# Patient Record
Sex: FEMALE | Race: WHITE | NOT HISPANIC OR LATINO | Employment: FULL TIME | URBAN - METROPOLITAN AREA
[De-identification: names, ages, dates, MRNs, and addresses within clinical notes are randomized per-mention and may not be internally consistent; named-entity substitution may affect disease eponyms.]

---

## 2018-04-11 ENCOUNTER — TRANSFERRED RECORDS (OUTPATIENT)
Dept: HEALTH INFORMATION MANAGEMENT | Facility: CLINIC | Age: 33
End: 2018-04-11

## 2018-10-02 ENCOUNTER — TRANSFERRED RECORDS (OUTPATIENT)
Dept: HEALTH INFORMATION MANAGEMENT | Facility: CLINIC | Age: 33
End: 2018-10-02

## 2018-10-02 LAB
HPV ABSTRACT: NORMAL
PAP-ABSTRACT: NORMAL

## 2019-05-30 ENCOUNTER — TRANSFERRED RECORDS (OUTPATIENT)
Dept: HEALTH INFORMATION MANAGEMENT | Facility: CLINIC | Age: 34
End: 2019-05-30

## 2021-06-21 ENCOUNTER — OFFICE VISIT (OUTPATIENT)
Dept: FAMILY MEDICINE | Facility: CLINIC | Age: 36
End: 2021-06-21
Payer: COMMERCIAL

## 2021-06-21 VITALS
DIASTOLIC BLOOD PRESSURE: 85 MMHG | BODY MASS INDEX: 40.29 KG/M2 | HEIGHT: 69 IN | WEIGHT: 272 LBS | SYSTOLIC BLOOD PRESSURE: 122 MMHG | TEMPERATURE: 98.4 F | HEART RATE: 91 BPM | OXYGEN SATURATION: 98 % | RESPIRATION RATE: 16 BRPM

## 2021-06-21 DIAGNOSIS — F41.9 ANXIETY AND DEPRESSION: ICD-10-CM

## 2021-06-21 DIAGNOSIS — J45.30 MILD PERSISTENT ASTHMA WITHOUT COMPLICATION: ICD-10-CM

## 2021-06-21 DIAGNOSIS — G43.709 CHRONIC MIGRAINE WITHOUT AURA WITHOUT STATUS MIGRAINOSUS, NOT INTRACTABLE: Primary | ICD-10-CM

## 2021-06-21 DIAGNOSIS — N92.6 IRREGULAR MENSES: ICD-10-CM

## 2021-06-21 DIAGNOSIS — F32.A ANXIETY AND DEPRESSION: ICD-10-CM

## 2021-06-21 DIAGNOSIS — Z30.011 ENCOUNTER FOR INITIAL PRESCRIPTION OF CONTRACEPTIVE PILLS: ICD-10-CM

## 2021-06-21 PROBLEM — G43.909 MIGRAINES: Status: ACTIVE | Noted: 2021-06-21

## 2021-06-21 PROBLEM — Z87.19 HISTORY OF INTUSSUSCEPTION: Status: ACTIVE | Noted: 2021-06-21

## 2021-06-21 PROBLEM — T78.40XA ALLERGIES: Status: ACTIVE | Noted: 2021-06-21

## 2021-06-21 PROBLEM — K58.9 IBS (IRRITABLE BOWEL SYNDROME): Status: ACTIVE | Noted: 2021-06-21

## 2021-06-21 PROCEDURE — 99204 OFFICE O/P NEW MOD 45 MIN: CPT | Performed by: FAMILY MEDICINE

## 2021-06-21 RX ORDER — CITALOPRAM HYDROBROMIDE 20 MG/1
20 TABLET ORAL DAILY
Qty: 90 TABLET | Refills: 3 | Status: SHIPPED | OUTPATIENT
Start: 2021-06-21 | End: 2022-06-15

## 2021-06-21 RX ORDER — FLUTICASONE PROPIONATE 50 MCG
1 SPRAY, SUSPENSION (ML) NASAL DAILY
COMMUNITY
End: 2023-04-26

## 2021-06-21 RX ORDER — LEVALBUTEROL TARTRATE 45 UG/1
1-2 AEROSOL, METERED ORAL EVERY 6 HOURS PRN
COMMUNITY
Start: 2020-07-24 | End: 2021-06-21

## 2021-06-21 RX ORDER — SUMATRIPTAN 5 MG/1
SPRAY NASAL
Qty: 1 EACH | Refills: 3 | Status: SHIPPED | OUTPATIENT
Start: 2021-06-21 | End: 2022-06-10

## 2021-06-21 RX ORDER — PROPRANOLOL HCL 60 MG
60 CAPSULE, EXTENDED RELEASE 24HR ORAL DAILY
COMMUNITY
Start: 2021-06-14 | End: 2021-06-21

## 2021-06-21 RX ORDER — CITALOPRAM HYDROBROMIDE 20 MG/1
20 TABLET ORAL DAILY
COMMUNITY
Start: 2021-06-14 | End: 2021-06-21

## 2021-06-21 RX ORDER — PROPRANOLOL HCL 60 MG
60 CAPSULE, EXTENDED RELEASE 24HR ORAL DAILY
Qty: 90 CAPSULE | Refills: 3 | Status: SHIPPED | OUTPATIENT
Start: 2021-06-21 | End: 2022-06-15

## 2021-06-21 RX ORDER — NORETHINDRONE ACETATE AND ETHINYL ESTRADIOL 1.5; 3 MG/1; UG/1
1 TABLET ORAL DAILY
COMMUNITY
Start: 2021-06-15 | End: 2021-06-21

## 2021-06-21 RX ORDER — NORETHINDRONE ACETATE AND ETHINYL ESTRADIOL 1.5; 3 MG/1; UG/1
1 TABLET ORAL DAILY
Qty: 90 TABLET | Refills: 3 | Status: SHIPPED | OUTPATIENT
Start: 2021-06-21 | End: 2022-05-23

## 2021-06-21 RX ORDER — IBUPROFEN 200 MG
200-400 TABLET ORAL EVERY 6 HOURS PRN
COMMUNITY
End: 2023-09-15

## 2021-06-21 RX ORDER — CETIRIZINE HYDROCHLORIDE 10 MG/1
10 TABLET ORAL DAILY
COMMUNITY
End: 2023-04-26

## 2021-06-21 RX ORDER — LEVALBUTEROL TARTRATE 45 UG/1
1-2 AEROSOL, METERED ORAL EVERY 6 HOURS PRN
Qty: 15 G | Refills: 4 | Status: SHIPPED | OUTPATIENT
Start: 2021-06-21 | End: 2023-09-18

## 2021-06-21 SDOH — HEALTH STABILITY: MENTAL HEALTH: HOW OFTEN DO YOU HAVE A DRINK CONTAINING ALCOHOL?: NEVER

## 2021-06-21 ASSESSMENT — MIFFLIN-ST. JEOR: SCORE: 2002.77

## 2021-06-21 NOTE — PATIENT INSTRUCTIONS
- Start the sumatriptan for migraine relief and see how this works.   - Continue all your other meds for now.  - Let's check back on your headaches in 6-8 weeks. We might want to consider a different preventive medication that has fewer side effects.

## 2021-06-21 NOTE — PROGRESS NOTES
"    Assessment & Plan     Chronic migraine without aura without status migrainosus, not intractable  Longstanding migraine history. No aura. Not intractable but reasonably frequent. Has not found sumatriptan to be an effective abortive medication in the oral form. Using propranolol with some success as a prophylactic medication. On low-dose estrogen OCPs but this does not seem to affect frequency.  - Refill propranolol ER (INDERAL LA) 60 MG 24 hr capsule; Take 1 capsule (60 mg) by mouth daily  - Consider alternative preventive medication options in follow-up.  - Trial of SUMAtriptan (IMITREX) 5 MG/ACT nasal spray; 2 sprays in one nostril with onset of headache. May repeat in 2 hours. Max 8 sprays/24 hours.  - FMLA paperwork completed today and scanned to chart.    Mild persistent asthma without complication  Previously on Breo Ellipta but not consistently taking this. Rare use of albuterol in the past several months despite ongoing environmental allergies.   - Refill of levalbuterol (XOPENEX HFA) 45 MCG/ACT inhaler; Inhale 1-2 puffs into the lungs every 6 hours as needed for wheezing  - Discuss asthma at next visit and complete ACT and asthma action plan.    Anxiety and depression  Longstanding issues. Doing \"okay\" but it's been a long year.   - Refill citalopram (CELEXA) 20 MG tablet; Take 1 tablet (20 mg) by mouth daily  - Follow-up PHQ-9 and ESTEFANI-7 at next visit.    Encounter for initial prescription of contraceptive pills  Irregular menses  No regular menses. Happy with this method. Doesn't desire children. FH significant for pulmonary embolus in her mother of unclear etiology. Work-up for genetic contributors was unremarkable.   - Refill AUROVELA 1.5/30 1.5-30 MG-MCG tablet; Take 1 tablet by mouth daily  - Discuss further in follow-up.     52 minutes spent on the date of the encounter doing chart review, review of outside records, patient visit and documentation        BMI:   Estimated body mass index is 39.83 " "kg/m  as calculated from the following:    Height as of this encounter: 1.76 m (5' 9.29\").    Weight as of this encounter: 123.4 kg (272 lb).   Weight management plan: To discuss at next visit    Follow-up in 6-8 weeks or sooner as needed.    Silvia Groves MD  [facility] Blake is a 35 year old who presents for the following health issues     HPI     Here to establish care. Previously was following at [facility] in [place]. Insurance change prompted a change in PCP from [facility]. Last visit about a year ago. Records from last several years reviewed today in [facility].    1. Migraines: Needs [facility] paperwork completed for migraines and asthma. She completes as a precaution for when she needs to miss work due to her migraine headaches and occasionally due to her asthma. She currently experiences migraines once every approximately week and a half. Triggers: stress at work and caffeine. Sinus pressure from allergies also contributes. No aura. Occasional accompanying nausea. Often becomes sensitive to light and sound. Finds ibuprofen 400 mg works the best as an abortive agent along with napping. Sumatriptan didn't help much when she tried it as an oral medication. She completely avoids caffeine. On propranolol for prophylaxis for the past two years with a reduction in frequency from 2+ times per week. No previous neurologist assessment. Has not tried any other preventive medications. She would love to have fewer headaches but is reasonably happy with the current frequency.     2. Asthma: Mild persistent in severity per chart review. Was previously on Breo Ellipta but not consistent with this in the past year. Has only been using her albuterol about once per month in the past several months. Also has severe environmental allergies and is using cetirizine and Flonase. Yard work can be a trigger, environmental allergens outside like pollen in the spring and " "fall and also dust and pet dander. Has been evaluated by an allergist before. Discussed shots with them but did not pursue these due to cost barriers. Had palpitations on albuterol and strongly prefers levalbuterol.    3. Ongoing depression and anxiety. Managed with citalopram and things are okay for now. Requests refill.    4. Contraception: Uses combined OCPs for contraception and irregular menses. No longer gets a regular period on this method. Does not desire children.     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 Still working from home but anticipating a return to the office. Male partner. Has a cat.         Objective    /85 (BP Location: Right arm, Patient Position: Sitting, Cuff Size: Adult Large)   Pulse 91   Temp 98.4  F (36.9  C) (Oral)   Resp 16   Ht 1.76 m (5' 9.29\")   Wt 123.4 kg (272 lb)   SpO2 98%   BMI 39.83 kg/m    Body mass index is 39.83 kg/m .  Physical Exam   GENERAL: healthy, alert and no distress  RESP: normal rate and effort.  PSYCH: Well groomed and well dressed. Mood is \"pretty good\" and affect is congruent. Good eye contact and humor throughout. Good insight and judgment.             "

## 2021-06-22 ENCOUNTER — TELEPHONE (OUTPATIENT)
Dept: FAMILY MEDICINE | Facility: CLINIC | Age: 36
End: 2021-06-22

## 2021-06-22 NOTE — TELEPHONE ENCOUNTER
Prior Authorization needed on:  6/22/21    Medication:  levalbuterol hfa Dose:  45mcg/act    Pharmacy confirmed as   [id number] - [place], [place] - [address] [place] [address]  Phone: [phone] Fax: [phone]  : Yes    Insurance Name:    Insurance Phone:   Insurance Patient ID:     Alternatives Suggested:  Albuterol sulfate ALYSSAA    NARESH AGUDELO CMA June 22, 2021 at 8:27 AM

## 2021-06-22 NOTE — TELEPHONE ENCOUNTER
Patient has previously not tolerated albuterol due to palpitations and requests that we pursue a prior authorization for levalbuterol. Please let me know what further documentation is needed.     Thanks!    Silvia Groves MD

## 2021-06-23 NOTE — TELEPHONE ENCOUNTER
Central Prior Authorization Team   Phone: [phone]      Prior Authorization Approval    Authorization Effective Date: 5/24/2021  Authorization Expiration Date: 6/23/2022  Medication: levalbuterol hfa 45mcg/act inhaler - APPROVED  Approved Dose/Quantity: 15 FOR 25  Reference #:     Insurance Company: [employer] - Phone [phone] Fax [phone]  Expected CoPay:       CoPay Card Available:      Foundation Assistance Needed:    Which Pharmacy is filling the prescription (Not needed for infusion/clinic administered): [id number] - [place], [place] - [address] AT [facility]  Pharmacy Notified: Yes  Patient Notified: Yes (**Instructed pharmacy to notify patient when script is ready to /ship.**)

## 2021-06-27 ENCOUNTER — HEALTH MAINTENANCE LETTER (OUTPATIENT)
Age: 36
End: 2021-06-27

## 2021-06-29 PROBLEM — Z00.00 HEALTHCARE MAINTENANCE: Status: ACTIVE | Noted: 2021-06-29

## 2021-06-29 PROBLEM — R73.03 PREDIABETES: Status: ACTIVE | Noted: 2021-06-29

## 2021-06-29 PROBLEM — Z83.2 FAMILY HISTORY OF FACTOR V DEFICIENCY: Status: ACTIVE | Noted: 2021-06-29

## 2021-06-29 PROBLEM — F41.1 GENERALIZED ANXIETY DISORDER: Status: ACTIVE | Noted: 2021-06-29

## 2021-07-22 ENCOUNTER — TELEPHONE (OUTPATIENT)
Dept: FAMILY MEDICINE | Facility: CLINIC | Age: 36
End: 2021-07-22
Payer: COMMERCIAL

## 2021-07-23 NOTE — TELEPHONE ENCOUNTER
I suspect that it depends on the patient's formulary, so it may involve her calling her insurance company directly. I believe that sumatriptan is the only option for the intranasal drug, but I will ask Dr. Griffith to weight in on this question first.    Silvia Groves MD

## 2021-08-16 NOTE — TELEPHONE ENCOUNTER
Apologies for delay.   We didn't get notified from insurance company that medicine wasn't covered (or required prior authorization), based on this anticipate that cost that patient quoted could be her copay / reflect her deductible. Unfortunately we don't have a lot of control of changing this. There are some coupons available at [employer] to bring down the price some but not sure how affordable this is, lowest price $104/6 doses.     Best case scenario may be to have follow up visit in clinic to determine if alternative agent appropriate.     Vane Bernabe, PharmD, CDCES (previously, CDE)  [facility]  Phone: [phone]  August 29, 2021 at 3:39 PM

## 2021-08-30 NOTE — TELEPHONE ENCOUNTER
Called patient no answer. Left detailed message on identified vm in regards to heather response and to schedule an appointment. If patient does call back please help assist in scheduling.

## 2021-10-04 ENCOUNTER — OFFICE VISIT (OUTPATIENT)
Dept: FAMILY MEDICINE | Facility: CLINIC | Age: 36
End: 2021-10-04
Payer: COMMERCIAL

## 2021-10-04 VITALS
BODY MASS INDEX: 40.73 KG/M2 | OXYGEN SATURATION: 100 % | TEMPERATURE: 98.7 F | SYSTOLIC BLOOD PRESSURE: 117 MMHG | HEART RATE: 87 BPM | HEIGHT: 69 IN | DIASTOLIC BLOOD PRESSURE: 79 MMHG | WEIGHT: 275 LBS

## 2021-10-04 DIAGNOSIS — Z87.898 HISTORY OF PREDIABETES: ICD-10-CM

## 2021-10-04 DIAGNOSIS — E66.01 MORBID OBESITY (H): ICD-10-CM

## 2021-10-04 DIAGNOSIS — Z23 NEED FOR PROPHYLACTIC VACCINATION AND INOCULATION AGAINST INFLUENZA: ICD-10-CM

## 2021-10-04 DIAGNOSIS — N92.6 IRREGULAR MENSES: ICD-10-CM

## 2021-10-04 DIAGNOSIS — J45.30 MILD PERSISTENT ASTHMA WITHOUT COMPLICATION: ICD-10-CM

## 2021-10-04 DIAGNOSIS — Z00.00 ROUTINE GENERAL MEDICAL EXAMINATION AT A HEALTH CARE FACILITY: Primary | ICD-10-CM

## 2021-10-04 LAB — HBA1C MFR BLD: 6 % (ref 0–5.6)

## 2021-10-04 PROCEDURE — 90686 IIV4 VACC NO PRSV 0.5 ML IM: CPT | Performed by: FAMILY MEDICINE

## 2021-10-04 PROCEDURE — 90471 IMMUNIZATION ADMIN: CPT | Performed by: FAMILY MEDICINE

## 2021-10-04 PROCEDURE — 36415 COLL VENOUS BLD VENIPUNCTURE: CPT | Performed by: FAMILY MEDICINE

## 2021-10-04 PROCEDURE — 99395 PREV VISIT EST AGE 18-39: CPT | Mod: 25 | Performed by: FAMILY MEDICINE

## 2021-10-04 PROCEDURE — 83036 HEMOGLOBIN GLYCOSYLATED A1C: CPT | Performed by: FAMILY MEDICINE

## 2021-10-04 ASSESSMENT — MIFFLIN-ST. JEOR: SCORE: 2000.15

## 2021-10-04 NOTE — PROGRESS NOTES
Female Physical Note    Concerns today:     Ongoing issues with headaches. Some improvements in her migraine headaches with the switch in her jobs. Frequency is still more often than she'd prefer. She tends to still get them every other day but it is more mild in severity from before. She takes her propranolol daily and has been at this dose for a bit.     ROS:  CONSTITUTIONAL: chronically low energy, no unexpected change in weight  SKIN: no worrisome rashes, no worrisome moles, some increased bumps in the arm pits and groin that leak pus and then resolve after bursting open - none currently  EYES: no acute vision problems or changes  ENT: no ear problems, no mouth problems, no throat problems  RESP: no significant cough, no shortness of breath, asthma has been well-controlled recently  CV: no chest pain, no palpitations, no new or worsening peripheral edema  GI: no nausea, no vomiting, long-term diarrhea after previous bowel surgery, intermittent heartburn   MSK: Some joint pain intermittently in the joints of her hands bilaterally - no swelling or redness.     Sexually Active: Yes  Sexual concerns: No   Contraception: OCP-see med list   P: 0  Menarche: No LMP recorded. Not bleeding regularly despite OCPs with placebo.   STD History: Neg  Last Pap Smear Date: 10/2/2018  Abnormal Pap History: NIL cytology and neg. HPV, previously normal Paps, due next in     Patient Active Problem List   Diagnosis     Mild persistent asthma     Migraines     IBS (irritable bowel syndrome)     Anxiety and depression     Allergies     History of intussusception     Family history of factor V deficiency     Generalized anxiety disorder     Healthcare maintenance     Prediabetes       Current Outpatient Medications   Medication Sig Dispense Refill     AUROVELA 1.5/30 1.5-30 MG-MCG tablet Take 1 tablet by mouth daily 90 tablet 3     cetirizine (ZYRTEC) 10 MG tablet Take 10 mg by mouth daily       citalopram (CELEXA) 20 MG tablet  Take 1 tablet (20 mg) by mouth daily 90 tablet 3     fluticasone (FLONASE) 50 MCG/ACT nasal spray Spray 1 spray into both nostrils daily       ibuprofen (ADVIL/MOTRIN) 200 MG tablet Take 200-400 mg by mouth every 6 hours as needed       levalbuterol (XOPENEX HFA) 45 MCG/ACT inhaler Inhale 1-2 puffs into the lungs every 6 hours as needed for wheezing 15 g 4     propranolol ER (INDERAL LA) 60 MG 24 hr capsule Take 1 capsule (60 mg) by mouth daily 90 capsule 3     SUMAtriptan (IMITREX) 5 MG/ACT nasal spray 2 sprays in one nostril with onset of headache. May repeat in 2 hours. Max 8 sprays/24 hours. 1 each 3       Past Medical History:   Diagnosis Date     Migraine      Noninfectious ileitis      Uncomplicated asthma         Family History     Problem (# of Occurrences) Relation (Name,Age of Onset)    Asthma (1) Mother    Cancer (2) Maternal Grandmother, Paternal Grandmother    Cerebrovascular Disease (1) Paternal Grandfather    Depression (2) Mother, Sister    Glaucoma (1) Maternal Great-Grandmother    Migraines (1) Mother    Pulmonary Embolism (1) Mother: In 2013 at age 63 years, Factor V deficiency, indefinite anticoagulation          Problem List Medication List and Allergy List were reviewed.    Patient is an established patient of this clinic..    Social History     Tobacco Use     Smoking status: Never Smoker     Smokeless tobacco: Never Used   Substance Use Topics     Alcohol use: Never     In a long-term relationship with her male partner  Children ? no    Has anyone hurt you physically, for example by pushing, hitting, slapping or kicking you or forcing you to have sex? Denies  Do you feel threatened or controlled by a partner, ex-partner or anyone in your life? Denies    RISK BEHAVIORS AND HEALTHY HABITS:  Tobacco Use/Smoking: None  Illicit Drug Use: None  Do you use alcohol? No  Diet (5-7 servings of fruits/veg daily): No   Struggles with produce after previous bowel surgery and short gut issues from  "intussusception surgery  Exercise (30 min accumulated most days):No  Dental Care: No   Calcium 1500 mg/d:  Yes  Seat Belt Use: No     Pap/HPV cotest every 5 years for women 30-65   Testing not indicated today. Done in 2018 with co-testing. Due in 2023    Immunization History   Administered Date(s) Administered     COVID-19,PF,Alcides 04/08/2021     Influenza Vaccine IM > 6 months Valent IIV4 (Alfuria,Fluzone) 10/04/2018, 10/24/2020, 10/04/2021     Pneumococcal 23 valent 07/24/2020     Tdap (Adacel,Boostrix) 05/05/2017    Reviewed Immunization Record Today    EXAMINATION:   /79   Pulse 87   Temp 98.7  F (37.1  C) (Oral)   Ht 1.742 m (5' 8.58\")   Wt 124.7 kg (275 lb)   SpO2 100%   BMI 41.11 kg/m    GENERAL: healthy, alert and no distress  EYES: Eyes grossly normal to inspection, extraocular movements - intact, and PERRL  HENT: ear canals- normal; TMs- normal; Nose- normal; Mouth- no ulcers, no lesions  NECK: no tenderness, no adenopathy, no asymmetry, no masses, no stiffness; thyroid- normal to palpation  RESP: lungs clear to auscultation - no rales, no rhonchi, no wheezes  BREAST: no masses, no tenderness, no nipple discharge, no palpable axillary masses or adenopathy  CV: regular rates and rhythm, normal S1 S2, no S3 or S4 and no murmur, no click or rub -  ABDOMEN: soft, no tenderness, no  hepatosplenomegaly, no masses, normal bowel sounds  MS: extremities- no gross deformities noted, no edema  SKIN: no suspicious lesions, no rashes  NEURO: strength and tone- normal, sensory exam- grossly normal, mentation- intact, speech- normal  BACK: normal range of motion, non-tender  - female: no concern, deferred, pap not due today  RECTAL- female: no concerns, deferred  PSYCH: Alert and oriented times 3; speech- coherent , normal rate and volume; able to articulate logical thoughts, able to abstract reason, no tangential thoughts, no hallucinations or delusions, affect- normal  LYMPHATICS: ant. cervical- normal, " post. cervical- normal, axillary- normal, supraclavicular- normal    ASSESSMENT/PLAN:    1. Routine general medical examination at a health care facility  Routine preventive visit today. Screenings updated below. Cervical cancer screening not indicated today - due again in 2023.     2. Need for prophylactic vaccination and inoculation against influenza  Routine immunization today.  - INFLUENZA VACCINE IM > 6 MONTHS VALENT IIV4 (AFLURIA/FLUZONE)    3. Morbid obesity (H)  Body mass index is 41.11 kg/m . Known prediabetes history. No other comorbidities.   - Discussed lifestyle strategies to promote health in detail.   - Advised briefly on the potential use of medications to promote weight loss.    4. History of prediabetes  Prediabetes history. A1c last done in 2018. Repeat today.  - Hemoglobin A1c; Future  - Hemoglobin A1c    5. Mild persistent asthma without complication  ACT today was 21. No longer taking her previous controller medication, Breo Ellipta. Avoiding triggers has resulted in excellent control without this medication.   - Asthma Control Test (HIM Scan)  - Asthma Action Plan (AAP)  - Discussed consideration of switching to an ICS/LABA reliever in line with LISANDRO guidelines given lack of consistent use of controller medication. Patient to consider.     6. Irregular menses  Irregular menses with no withdrawal bleeds for over 1 year despite combined OCP use. Possible history of PCOS.  - Advised return for further history and to pursue work-up of secondary amenorrhea with TSH, prolactin, FSH, testosterone, and estradiol.    Follow-up to discuss migraine headaches management further.     Silvia Groves MD

## 2021-10-04 NOTE — PATIENT INSTRUCTIONS
- Start working on moving 10-15 minutes per day more than your typical. Make changes gradually and do things that are fun for you to motivate.     - Consider talking to a nutritionist to explore ways that you might be able to eat more produce     - Make a follow-up appointment to talk further about your menstrual cycles and why you're not having withdrawal bleeds. It's important to check various hormone levels to see what's going on here.     Preventive Health Recommendations  Female Ages 26 - 39  Yearly exam:   See your health care provider every year in order to    Review health changes.     Discuss preventive care.      Review your medicines if you your doctor has prescribed any.    Until age 30: Get a Pap test every three years (more often if you have had an abnormal result).    After age 30: Talk to your doctor about whether you should have a Pap test every 3 years or have a Pap test with HPV screening every 5 years.   You do not need a Pap test if your uterus was removed (hysterectomy) and you have not had cancer.  You should be tested each year for STDs (sexually transmitted diseases), if you're at risk.   Talk to your provider about how often to have your cholesterol checked.  If you are at risk for diabetes, you should have a diabetes test (fasting glucose).  Shots: Get a flu shot each year. Get a tetanus shot every 10 years.   Nutrition:     Eat at least 5 servings of fruits and vegetables each day.    Eat whole-grain bread, whole-wheat pasta and brown rice instead of white grains and rice.    Get adequate Calcium and Vitamin D.     Lifestyle    Exercise at least 150 minutes a week (30 minutes a day, 5 days of the week). This will help you control your weight and prevent disease.    Limit alcohol to one drink per day.    No smoking.     Wear sunscreen to prevent skin cancer.    See your dentist every six months for an exam and cleaning.    My Asthma Action Plan  Name: Lou Treviño  Date of Birth:   1985  Date: 10/4/2021   My doctor: Silvia Groves   My clinic:   [facility] [address] [place] [address]  [phone]    My Asthma Severity: Mild Persistent Avoid your asthma triggers: smoke, upper respiratory infections, dust mites, pollens, animal dander, insects/rodents, mold, humidity and cold air      GREEN ZONE   Good Control    I feel good    No cough or wheeze    Can work, sleep and play without asthma symptoms       1. If exercise triggers your asthma, take your rescue medication (2 puffs of albuterol, Ventolin/Pro-Air) 15 minutes before exercise or sports, and during exercise if you have asthma symptoms.  2. Spacer to use with inhaler: If you have a spacer, make sure to use it with your inhaler.              YELLOW ZONE Getting Worse  I have ANY of these:    I do not feel good    Cough or wheeze    Chest feels tight    Wake up at night   1. Start taking your rescue medicine (1-2 puffs of albuterol - Ventolin/Pro-Air) every 4-6 hours as needed.  2. If symptoms are not controlled with above, can take 2 puffs every 20 minutes for up to 1 hour, then continue every 4 hours if needed.   3. If you do not return to the Green Zone in 12-24 hours or you get worse, call the clinic.         RED ZONE Medical Alert - Get Help  I have ANY of these:    I feel awful    Medicine is not helping    Breathing getting harder    Trouble walking or talking    Nose opens wide to breathe       1. Take your rescue medicine NOW (6-8 puffs of albuterol - Ventolin/Pro-Air) for every 20 minutes for up to 1 hour.  2. If your provider has prescribed an oral steroid medicine (Prednisone 40 mg), start taking it NOW.  3. Call your doctor NOW.  4. If you are still in the Red Zone after 20 minutes and you have not reached your doctor:    Take your rescue medicine again (6-8 puffs of albuterol - Ventolin/Pro-Air) and    Call 911 or go to the emergency room right away    See your  regular doctor within 1 weeks of an Emergency Room or Urgent Care visit for follow-up treatment.        This Asthma Action Plan provides authorization for the administration of medication described in the AAP.  YES    Electronically signed by: Silvia Groves MD    Annual Reminders:  Meet with Asthma Educator,  Flu Shot in the Fall, Pneumonia Shot  Pharmacy: [id number] - [place], [place] - [address]    Patient Education     Understanding Hidradenitis Suppurativa  Hidradenitis suppurativa is a long-term (chronic) skin disease. It causes painful bumps and sores (abscesses) to form around a hair follicle. Follicles are the tiny holes from which hair grows out of your skin. The disease occurs on parts of the body where skin rubs together. It most often appears in the armpits, the groin area, and under the breasts. It's more common in women.    How to say it  [id number]  What causes hidradenitis suppurativa?  This skin disease happens when hair follicles become clogged with keratin. Keratin is the protein that makes up your hair and nails. The follicles then burst and become inflamed . Pressure or rubbing on the skin can clog the follicles. Or it can further irritate them.   The disease tends to run in families. It s also more likely to occur in people who are obese, have diabetes, or smoke. Hormones or the immune system may also play a part.   Symptoms of hidradenitis suppurativa  This skin disease causes one or more painful red bumps on the skin. These bumps become inflamed and drain pus. They may also itch or burn. In severe cases, sinus tracts may form. These are narrow channels that run under the skin. Blood or a bad-smelling pus may ooze from these bumps or sinus tracts. Bands of scarring often occur.   Treatment for hidradenitis suppurativa  Treatment for this skin disease is most successful when started early. But it may be  hard to diagnose. It may be mistaken for other skin conditions. The painful bumps also often return. So stopping new bumps and limiting scarring is important. Treatment options include:     Warm compress. Putting a warm, wet washcloth on the affected skin may help.    Lifestyle changes. Your symptoms may get better if you lose weight or stop smoking, if needed. Also avoid shaving or other irritants, such as deodorant or perfume.    Antibiotics. For mild cases, an antibiotic for the skin (topical) may help. You may need oral antibiotics if you have a severe case. They can help prevent further infection.    Other oral medicines. Over-the-counter pain medicines can ease pain and inflammation. You may need stronger medicines for a severe case. These medicines include corticosteroids or a retinoid. These may cause side effects.    Injected medicines. A steroid may be injected into the bump to ease pain. A newer biologic medicine may be injected to ease severe symptoms.    Surgery. Surgery can drain and remove the painful bumps. For severe cases, the doctor may cut out the entire area of affected skin or destroy it with a laser.  Possible complications of hidradenitis suppurativa   These include:    Arthritis    Depression    Lymphedema    Scarring of skin    Skin cancer  When to call your healthcare provider  Call your healthcare provider right away if you have any of these:    Fever of 100.4 F (38 C) or higher, or as directed by your healthcare provider    Redness, swelling, or fluid leaking from your rash that gets worse    Pain that gets worse    Symptoms that don t get better, or get worse    New symptoms  [employer] last reviewed this educational content on 6/1/2019 2000-2021 [employer]. All rights reserved. This information is not intended as a substitute for professional medical care. Always follow your healthcare professional's instructions.

## 2021-10-05 ASSESSMENT — ASTHMA QUESTIONNAIRES: ACT_TOTALSCORE: 21

## 2022-04-26 ENCOUNTER — TELEPHONE (OUTPATIENT)
Dept: FAMILY MEDICINE | Facility: CLINIC | Age: 37
End: 2022-04-26
Payer: COMMERCIAL

## 2022-04-26 NOTE — TELEPHONE ENCOUNTER
On review of our patient panels,  this patient is due for a clinic appointment. Please help arrange to schedule an appointment for -Cervical Cancer Screening  -Wellness (Physical) Visit  in 2-3 Months.    Silvia Groves MD

## 2022-05-23 DIAGNOSIS — N92.6 IRREGULAR MENSES: ICD-10-CM

## 2022-05-23 DIAGNOSIS — Z30.011 ENCOUNTER FOR INITIAL PRESCRIPTION OF CONTRACEPTIVE PILLS: ICD-10-CM

## 2022-05-23 RX ORDER — NORETHINDRONE ACETATE AND ETHINYL ESTRADIOL 1.5; 3 MG/1; UG/1
1 TABLET ORAL DAILY
Qty: 90 TABLET | Refills: 1 | Status: SHIPPED | OUTPATIENT
Start: 2022-05-23 | End: 2023-04-26

## 2022-05-23 NOTE — TELEPHONE ENCOUNTER
Message to physician:     Date of last visit: 10/4/2021    Date of next visit if scheduled: none    No results found for: POTASSIUM, CR, GFRESTIMATED    BP Readings from Last 3 Encounters:   10/04/21 117/79   06/21/21 122/85       Hemoglobin A1C   Date Value Ref Range Status   10/04/2021 6.0 (H) 0.0 - 5.6 % Final     Comment:     Normal <5.7%   Prediabetes 5.7-6.4%    Diabetes 6.5% or higher     Note: Adopted from ADA consensus guidelines.       Please complete refill and CLOSE ENCOUNTER.  Closing the encounter signifies the refill is complete.

## 2022-05-23 NOTE — TELEPHONE ENCOUNTER
Refill approved for 6 months. Please have Valorie follow up in that interval to check her blood pressure prior to further refills.     Silvia Groves MD

## 2022-05-27 ENCOUNTER — OFFICE VISIT (OUTPATIENT)
Dept: FAMILY MEDICINE | Facility: CLINIC | Age: 37
End: 2022-05-27
Payer: COMMERCIAL

## 2022-05-27 VITALS
OXYGEN SATURATION: 99 % | SYSTOLIC BLOOD PRESSURE: 116 MMHG | WEIGHT: 280 LBS | DIASTOLIC BLOOD PRESSURE: 76 MMHG | HEART RATE: 69 BPM | RESPIRATION RATE: 16 BRPM | BODY MASS INDEX: 41.85 KG/M2

## 2022-05-27 DIAGNOSIS — F32.A ANXIETY AND DEPRESSION: ICD-10-CM

## 2022-05-27 DIAGNOSIS — E66.01 MORBID OBESITY (H): ICD-10-CM

## 2022-05-27 DIAGNOSIS — F41.9 ANXIETY AND DEPRESSION: ICD-10-CM

## 2022-05-27 DIAGNOSIS — J45.30 MILD PERSISTENT ASTHMA WITHOUT COMPLICATION: ICD-10-CM

## 2022-05-27 DIAGNOSIS — G43.709 CHRONIC MIGRAINE WITHOUT AURA WITHOUT STATUS MIGRAINOSUS, NOT INTRACTABLE: ICD-10-CM

## 2022-05-27 DIAGNOSIS — R11.0 NAUSEA: ICD-10-CM

## 2022-05-27 DIAGNOSIS — R10.84 GENERALIZED POSTPRANDIAL ABDOMINAL PAIN: Primary | ICD-10-CM

## 2022-05-27 LAB
ALBUMIN SERPL-MCNC: 3.3 G/DL (ref 3.5–5)
ALP SERPL-CCNC: 64 U/L (ref 45–120)
ALT SERPL W P-5'-P-CCNC: 23 U/L (ref 0–45)
ANION GAP SERPL CALCULATED.3IONS-SCNC: 11 MMOL/L (ref 5–18)
AST SERPL W P-5'-P-CCNC: 21 U/L (ref 0–40)
BILIRUB SERPL-MCNC: 0.3 MG/DL (ref 0–1)
BUN SERPL-MCNC: 13 MG/DL (ref 8–22)
CALCIUM SERPL-MCNC: 9.5 MG/DL (ref 8.5–10.5)
CHLORIDE BLD-SCNC: 104 MMOL/L (ref 98–107)
CO2 SERPL-SCNC: 22 MMOL/L (ref 22–31)
CREAT SERPL-MCNC: 0.68 MG/DL (ref 0.6–1.1)
GFR SERPL CREATININE-BSD FRML MDRD: >90 ML/MIN/1.73M2
GLUCOSE BLD-MCNC: 83 MG/DL (ref 70–125)
POTASSIUM BLD-SCNC: 4 MMOL/L (ref 3.5–5)
PROT SERPL-MCNC: 7.8 G/DL (ref 6–8)
SODIUM SERPL-SCNC: 137 MMOL/L (ref 136–145)

## 2022-05-27 PROCEDURE — 99214 OFFICE O/P EST MOD 30 MIN: CPT | Performed by: FAMILY MEDICINE

## 2022-05-27 PROCEDURE — 80053 COMPREHEN METABOLIC PANEL: CPT | Performed by: FAMILY MEDICINE

## 2022-05-27 PROCEDURE — 36415 COLL VENOUS BLD VENIPUNCTURE: CPT | Performed by: FAMILY MEDICINE

## 2022-05-27 ASSESSMENT — ASTHMA QUESTIONNAIRES
QUESTION_3 LAST FOUR WEEKS HOW OFTEN DID YOUR ASTHMA SYMPTOMS (WHEEZING, COUGHING, SHORTNESS OF BREATH, CHEST TIGHTNESS OR PAIN) WAKE YOU UP AT NIGHT OR EARLIER THAN USUAL IN THE MORNING: NOT AT ALL
ACT_TOTALSCORE: 22
QUESTION_4 LAST FOUR WEEKS HOW OFTEN HAVE YOU USED YOUR RESCUE INHALER OR NEBULIZER MEDICATION (SUCH AS ALBUTEROL): NOT AT ALL
QUESTION_2 LAST FOUR WEEKS HOW OFTEN HAVE YOU HAD SHORTNESS OF BREATH: ONCE OR TWICE A WEEK
QUESTION_1 LAST FOUR WEEKS HOW MUCH OF THE TIME DID YOUR ASTHMA KEEP YOU FROM GETTING AS MUCH DONE AT WORK, SCHOOL OR AT HOME: A LITTLE OF THE TIME
ACT_TOTALSCORE: 22
QUESTION_5 LAST FOUR WEEKS HOW WOULD YOU RATE YOUR ASTHMA CONTROL: WELL CONTROLLED

## 2022-05-27 ASSESSMENT — PATIENT HEALTH QUESTIONNAIRE - PHQ9: SUM OF ALL RESPONSES TO PHQ QUESTIONS 1-9: 9

## 2022-05-27 NOTE — PROGRESS NOTES
"  Assessment & Plan     Generalized postprandial abdominal pain  Nausea  Wide differential. Postprandial episodes raises potential for gallbladder etiology. May also represent dyspepsia or pancreatitis. No obstructive symptoms. No vomiting. Pregnancy unlikely per history.   - Comprehensive metabolic panel; Future  - Comprehensive metabolic panel  - If no clear etiology on labs and persistent, low threshold for imaging. Would likely start with RUQ US.    Morbid obesity (H)  Body mass index is 41.85 kg/m . Increased risk factor for gallbladder pathology. Hx of prediabetes but no other known complications.   - Work-up for nausea and abdominal pain as above.   - Due for repeat A1c in follow-up.     Mild persistent asthma without complication  Symptoms well-controlled with allergy medications alone. Not requiring TAMARA inhaler.   - Continue current treatment.    Anxiety and depression  Symptoms stable on citalopram. PHQ of 9 in the past two weeks, slightly worse due to not feeling physically well.   - Continue current treatments.    Migraines  Experiencing daily headaches, most of migraine type. On propranolol consistently but interested in exploring other prophylactics that could help to more effectively reduce the frequency of her headaches. Has never seen neurology.  - Have room to up-titrate but may benefit from other preventive therapies.   - Return for follow-up visit to discuss these options more in detail.     36 minutes spent on the date of the encounter doing chart review, patient visit and documentation        BMI:   Estimated body mass index is 41.85 kg/m  as calculated from the following:    Height as of 10/4/21: 1.742 m (5' 8.58\").    Weight as of this encounter: 127 kg (280 lb).   Weight management plan: Did not address in detail today.    Follow up in 2 weeks for migraines and nausea with abdominal pain.     Silvia Groves MD  [facility]        Juliet   Valorie is a 36 " year old who presents for the following health issues     HPI     Having a lot of nausea recently for the past few weeks. It got really bad this past weekend and was accompanied by abdominal pain. Every time she would eat something, she would get nausea and then have abdominal cramping, like there's a ball in her stomach. The episodes would last hours at a time, up to 1/2 day at a time. She has been getting these episodes for 3-4 days in a row and now has been back to normal. The pain is located near her belly button and feels like intense cramping pain. No radiation. Worse with movement. No changes to her BMs. She has chronic loose stools. Maybe a bit more urgency without seeing the results. No volume changes or red, white, or black. She had a few days of temperature regulation but didn't notice if it was a true fever. Rare GERD-like symptoms.     No new food exposures or major changes to her diet. Some more anxiety recently but this doesn't typically cause nausea. No new medications or drug use. Using OCP regularly and hasn't had intercourse in 5 months. No vertigo.    History of migraines. Haven't been terrible recently. No significant nausea typically. Doesn't think she could be pregnant. No more severe headaches but has been getting daily headaches. Still taking propranolol for prevention and it doesn't seem to be working as well.     She has a history of intussusception in childhood. No known FH of abdominal pain.     No issues with asthma. No use of her inhaler. ACT of 22 today.     PHQ-9 of 9. Stable symptoms with anxiety and depression on her current regimen of citalopram.         Objective    /76   Pulse 69   Resp 16   Wt 127 kg (280 lb)   SpO2 99%   BMI 41.85 kg/m    Body mass index is 41.85 kg/m .  Physical Exam   GENERAL: alert, in no acute distress  RESP: normal rate and effort  ABDOMEN: soft, no significant tenderness to deep palpation, no appreciable hepatosplenomegaly, no masses    ACT  Total Scores 10/4/2021 5/27/2022   ACT TOTAL SCORE (Goal Greater than or Equal to 20) 21 22   In the past 12 months, how many times did you visit the emergency room for your asthma without being admitted to the hospital? 0 0   In the past 12 months, how many times were you hospitalized overnight because of your asthma? 0 0     PHQ 5/27/2022   PHQ-9 Total Score 9   Q9: Thoughts of better off dead/self-harm past 2 weeks Not at all

## 2022-05-27 NOTE — PATIENT INSTRUCTIONS
- I'll contact you via [employer] with the lab test results.     - Let's check back with each other in 2-3 weeks to discuss your migraine medication more specifically and to follow up on the nausea.

## 2022-05-27 NOTE — COMMUNITY RESOURCES LIST (ENGLISH)
05/27/2022   [facility] - Outpatient Clinics  Catrina Traylor  For questions about this resource list or additional care needs, please contact your primary care clinic or care manager.  Phone: [phone]   Email: N/A   Address: [address] [place] [id number]   Hours: N/A        Hotlines and Helplines       Hotline - Crisis help  1  [facility] Crisis Line Distance: 2.2 miles      COVID-19 Status: Phone/Virtual   [address] [place] [address]  Language: English, Icelandic  Hours: Mon - Sun Open 24 Hours   Phone: (906) 361-7661 Email: info@[employer].org Website: http://www.[employer].org     2  [place] Department of Human Services - Crisis Text Line - Crisis Text Line Distance: 3.31 miles      COVID-19 Status: Phone/Virtual   [address] [place] [address]  Language: English  Hours: Mon - Sun Open 24 Hours   Website: https://[place].gov/dhs/partners-and-providers/policies-procedures/adult-mental-health/crisis-text-line/          Mental Health       Individual counseling  3  [facility] Distance: 0.7 miles      COVID-19 Status: Service Unavailable   [address] [place] [address]  Language: English  Hours: Mon - Fri 8:00 AM - 5:00 PM  Fees: Insurance, Self Pay   Phone: (397) 426-3004 Website: https://[facility].org/locations/r-lddrij-tifcoryw-[facility]---[facility]-[address] [facility] Distance: 1.74 miles      COVID-19 Status: Regular Operations, COVID-19 Status: Phone/Virtual   [address] [place] [address]  Language: English  Hours: Mon 7:00 AM - 7:00 PM , Tue 7:00 AM - 5:00 PM , Wed - Thu 7:00 AM - 7:00 PM , Fri 7:00 AM - 5:00 PM , Sat 8:00 AM - 12:00 PM  Fees: Insurance, Self Pay   Phone: (670) 331-7959 Website: https://www.vufind/care/find/location/primary-care-clinics/healthpartners/[facility]/     Mental health crisis care  5  [facility]  - Adult Program Distance: 3.33 miles      COVID-19 Status: Regular Operations   [address] [place] [address]  Language: English, Hmong, Guyanese  Hours: Mon - Fri 8:00 AM - 5:30 PM  Fees: Insurance, Self Pay, Sliding Fee   Phone: (978) 884-8111 Website: https://www.[facility]./residents/health-medical/clinics-services/mental-behavorial-health/adult-mental-health-chemical-health/urgent-care-adult-mental-health     6  [employer] - Crisis Stabilization Services ([facility]) Distance: 6.21 miles      COVID-19 Status: Regular Operations   [address] [place] [address]  Language: English, Congolese  Hours: Mon - Sun Open 24 Hours  Fees: Insurance   Phone: (752) 661-6109 Email: info@[employer]  Website: https://[employer] /services-programs/residential-services/[facility]/     Mental health support group  7  [facility] Distance: 3.92 miles      COVID-19 Status: Phone/Virtual   [address] [facility] [address] [place] [address]  Language: English  Hours: Mon - Fri 8:00 AM - 4:30 PM  Fees: Free   Phone: (291) 579-4612 Website: https://www.va.gov/find-locations/facility/vc_0416V     8  [facility] Distance: 4.87 miles      COVID-19 Status: Phone/Virtual   [address] [facility] [address] [place] [address]  Language: English  Hours: Mon - Thu 7:00 AM - 8:00 PM , Fri 7:00 AM - 5:00 PM  Fees: Insurance, Self Pay   Phone: (989) 443-2813 Email: brian@[employer] Website: https://www.[employer]/our-locations/[place]/[facility]/          Important Numbers & Websites       Emergency Services   911  City Services   311  Poison Control   (912) 375-6778  Suicide Prevention Lifeline   (310) 184-9987 (TALK)  Child Abuse Hotline   (633) 758-3292 (4-A-Child)  Sexual Assault Hotline   (699) 230-1441 (HOPE)  National Runaway Safeline   (360) 905-4795 (RUNAWAY)  All-Options Talkline   (787)  596-7598  Substance Abuse Referral   (825) 363-9000 (HELP)

## 2022-05-31 DIAGNOSIS — R10.84 GENERALIZED POSTPRANDIAL ABDOMINAL PAIN: ICD-10-CM

## 2022-05-31 DIAGNOSIS — R11.0 NAUSEA: Primary | ICD-10-CM

## 2022-05-31 NOTE — PROGRESS NOTES
Can we please call Valorie to schedule an abdominal ultrasound in the next 7-10 days (ideally before her appointment with me on 6/10).     Thanks!    Silvia Groves MD

## 2022-06-09 ENCOUNTER — ANCILLARY PROCEDURE (OUTPATIENT)
Dept: ULTRASOUND IMAGING | Facility: CLINIC | Age: 37
End: 2022-06-09
Attending: FAMILY MEDICINE
Payer: COMMERCIAL

## 2022-06-09 DIAGNOSIS — R10.84 GENERALIZED POSTPRANDIAL ABDOMINAL PAIN: ICD-10-CM

## 2022-06-09 DIAGNOSIS — R11.0 NAUSEA: ICD-10-CM

## 2022-06-09 PROCEDURE — 76705 ECHO EXAM OF ABDOMEN: CPT | Mod: TC | Performed by: RADIOLOGY

## 2022-06-10 ENCOUNTER — OFFICE VISIT (OUTPATIENT)
Dept: FAMILY MEDICINE | Facility: CLINIC | Age: 37
End: 2022-06-10
Payer: COMMERCIAL

## 2022-06-10 VITALS
TEMPERATURE: 98.7 F | OXYGEN SATURATION: 98 % | HEART RATE: 73 BPM | RESPIRATION RATE: 18 BRPM | SYSTOLIC BLOOD PRESSURE: 109 MMHG | BODY MASS INDEX: 41.72 KG/M2 | DIASTOLIC BLOOD PRESSURE: 77 MMHG | WEIGHT: 279.08 LBS

## 2022-06-10 DIAGNOSIS — E88.09 HYPOALBUMINEMIA: ICD-10-CM

## 2022-06-10 DIAGNOSIS — R10.84 GENERALIZED POSTPRANDIAL ABDOMINAL PAIN: Primary | ICD-10-CM

## 2022-06-10 DIAGNOSIS — Z87.898 HISTORY OF PREDIABETES: ICD-10-CM

## 2022-06-10 DIAGNOSIS — K76.0 HEPATIC STEATOSIS: ICD-10-CM

## 2022-06-10 DIAGNOSIS — G43.709 CHRONIC MIGRAINE WITHOUT AURA WITHOUT STATUS MIGRAINOSUS, NOT INTRACTABLE: ICD-10-CM

## 2022-06-10 DIAGNOSIS — R11.0 NAUSEA: ICD-10-CM

## 2022-06-10 LAB
ALBUMIN SERPL-MCNC: 3.3 G/DL (ref 3.5–5)
HBA1C MFR BLD: 6 % (ref 0–5.6)
LIPASE SERPL-CCNC: 31 U/L (ref 0–52)

## 2022-06-10 PROCEDURE — 83036 HEMOGLOBIN GLYCOSYLATED A1C: CPT | Performed by: FAMILY MEDICINE

## 2022-06-10 PROCEDURE — 82040 ASSAY OF SERUM ALBUMIN: CPT | Performed by: FAMILY MEDICINE

## 2022-06-10 PROCEDURE — 99215 OFFICE O/P EST HI 40 MIN: CPT | Performed by: FAMILY MEDICINE

## 2022-06-10 PROCEDURE — 83690 ASSAY OF LIPASE: CPT | Performed by: FAMILY MEDICINE

## 2022-06-10 PROCEDURE — 36415 COLL VENOUS BLD VENIPUNCTURE: CPT | Performed by: FAMILY MEDICINE

## 2022-06-10 RX ORDER — RIMEGEPANT SULFATE 75 MG/75MG
75 TABLET, ORALLY DISINTEGRATING ORAL DAILY PRN
Qty: 20 TABLET | Refills: 1 | Status: SHIPPED | OUTPATIENT
Start: 2022-06-10 | End: 2022-07-01

## 2022-06-10 RX ORDER — FAMOTIDINE 20 MG/1
20 TABLET, FILM COATED ORAL 2 TIMES DAILY
Qty: 180 TABLET | Refills: 0 | Status: SHIPPED | OUTPATIENT
Start: 2022-06-10 | End: 2023-02-14

## 2022-06-10 NOTE — PATIENT INSTRUCTIONS
- Start famotidine and take it twice daily before breakfast and dinner.     - We'll reach out with your lab tests.    - Let's continue at your current dose of propranolol while we sort out your medication to treat the migraine headaches. I'll be prepared to fill out the prior authorization paperwork to push though once it arrives back at our clinic.     - Follow-up in 3-4 weeks, or sooner if worsening symptoms.

## 2022-06-10 NOTE — PROGRESS NOTES
Assessment & Plan     Generalized postprandial abdominal pain  Nausea  Unclear etiology. Lab work-up with normal CMP aside from hypoalbuminemia. Abdominal US with normal gallbladder and no gallstones, hepatic steatosis, and pancrease not visualized. No vomiting but struggles to eat due to nausea symptoms. Neg. home UPT and on OCPs. Some dyspepsia at times.   - Trial famotidine (PEPCID) 20 MG tablet; Take 1 tablet (20 mg) by mouth 2 times daily  - Lipase; Future  - Lipase  - famotidine (PEPCID) 20 MG tablet; Take 1 tablet (20 mg) by mouth 2 times daily    Hepatic steatosis  Incidental finding on abdominal imagine. Body mass index is 41.72 kg/m . Normal hepatic labs aside from hypoalbuminemia. 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  - Consider meeting with GI dietician for guidance on optimizing diet for weight loss.   - Could consider starting a weight loss med regimen in follow-up.    History of prediabetes  A1c stable today at 6.0.   - Hemoglobin A1c; Future  - Hemoglobin A1c  - Consider weight loss strategies as above.     Hypoalbuminemia  Mildly low serum albumin levels with work-up for abdominal pain and nausea. May be related to short gut?  - Albumin level; Future  - Albumin level    Chronic migraine without aura without status migrainosus, not intractable  Chronic daily headaches with lack of response to oral sumatriptan. Cannot afford intranasal sumatriptan. On propranolol prophylaxis at 60 mg with borderline low BP and pulse in the 70s. Doesn't think that she can tolerate a dose increase of the preventive med at this time.   - Start Rimegepant Sulfate (NURTEC) 75 MG TBDP; Take 75 mg by mouth daily as needed (migraine headache) Take at the first sign of migraine headache.   - Consider an alternative preventive agent in follow up if not improving disruption of cycle with Nurtec as an abortive agent.     48 minutes spent on the date of  the encounter doing chart review, review of test results, patient visit and documentation        Follow up in 3-4 weeks or sooner if worsening symptoms.     Silvia Groves MD  [facility]    Juliet Eugene is a 36 year old who presents for the following health issues     HPI     Follow up abdominal pain and nausea. She continues to have nausea daily. No vomiting. Staying the same. No clear triggers for her nausea. No more severe abdominal pain but describes almost daily discomfort in her central abdomen. Abdominal ultrasound did not show any gallstones or gallbladder abnormalities. Normal bile duct. Liver with hepatic steatosis.   Has struggled with being able to eat vegetables because they run right through her due to her bowel issues after a childhood intussusception surgery. She is having one more loose stool per day than normal. No blood in the stool.  Has had issues with acid reflux before but no clear symptoms here. Tums helps somewhat.    Migraine headaches: Still having daily headaches. Caffeine is a big trigger.   Has tried sumatriptan orally without improvement and could not afford the intranasal sumatriptan. Has not tried other triptans. Is taking propranolol consistently at 60 mg dose with frequent headaches. No recent worsening in headache severity. No other red flag symptoms.         Objective    /77   Pulse 73   Temp 98.7  F (37.1  C)   Resp 18   Wt 126.6 kg (279 lb 1.3 oz)   SpO2 98%   BMI 41.72 kg/m    Body mass index is 41.72 kg/m .  Physical Exam

## 2022-06-15 DIAGNOSIS — G43.709 CHRONIC MIGRAINE WITHOUT AURA WITHOUT STATUS MIGRAINOSUS, NOT INTRACTABLE: ICD-10-CM

## 2022-06-15 DIAGNOSIS — F41.9 ANXIETY AND DEPRESSION: ICD-10-CM

## 2022-06-15 DIAGNOSIS — F32.A ANXIETY AND DEPRESSION: ICD-10-CM

## 2022-06-17 RX ORDER — CITALOPRAM HYDROBROMIDE 20 MG/1
20 TABLET ORAL DAILY
Qty: 90 TABLET | Refills: 3 | Status: SHIPPED | OUTPATIENT
Start: 2022-06-17 | End: 2022-12-05

## 2022-06-17 RX ORDER — PROPRANOLOL HCL 60 MG
60 CAPSULE, EXTENDED RELEASE 24HR ORAL DAILY
Qty: 90 CAPSULE | Refills: 3 | Status: SHIPPED | OUTPATIENT
Start: 2022-06-17 | End: 2022-12-05

## 2022-07-01 ENCOUNTER — VIRTUAL VISIT (OUTPATIENT)
Dept: FAMILY MEDICINE | Facility: CLINIC | Age: 37
End: 2022-07-01
Payer: COMMERCIAL

## 2022-07-01 DIAGNOSIS — Z87.19 HISTORY OF INTUSSUSCEPTION: ICD-10-CM

## 2022-07-01 DIAGNOSIS — R11.0 NAUSEA: ICD-10-CM

## 2022-07-01 DIAGNOSIS — G43.709 CHRONIC MIGRAINE WITHOUT AURA WITHOUT STATUS MIGRAINOSUS, NOT INTRACTABLE: Primary | ICD-10-CM

## 2022-07-01 DIAGNOSIS — K76.0 HEPATIC STEATOSIS: ICD-10-CM

## 2022-07-01 DIAGNOSIS — E88.09 HYPOALBUMINEMIA: ICD-10-CM

## 2022-07-01 PROCEDURE — 99214 OFFICE O/P EST MOD 30 MIN: CPT | Mod: 95 | Performed by: FAMILY MEDICINE

## 2022-07-01 RX ORDER — RIMEGEPANT SULFATE 75 MG/75MG
75 TABLET, ORALLY DISINTEGRATING ORAL
Qty: 45 TABLET | Refills: 1 | Status: SHIPPED | OUTPATIENT
Start: 2022-07-01 | End: 2023-04-26

## 2022-07-01 NOTE — PROGRESS NOTES
"Family Medicine Video Visit Note  Valorie is being evaluated via a billable video visit.               Video Visit Consent     Patient was verbally read the following and verbal consent was obtained.  \"Video visits are billed at different rates depending on your insurance coverage. During this emergency period, for some insurers they may be billed the same as an in-person visit.  Please reach out to your insurance provider with any questions.  If during the course of the call the physician/provider feels a video visit is not appropriate, you will not be charged for this service.\"     (Name person giving consent:  Patient   Date verbal consent given:  7/1/2022  Time verbal consent given:  4:23 PM)    Patient would like the video invitation sent by: Text to cell phone      Chief Complaint   Patient presents with     RECHECK     From last visit changes, no concerns.          HPI     Video Start Time: 4:49 PM    Valorie presents to clinic today for the following health issues:    Nausea/vomiting is less since she started taking the Pepcid. Often is taking it right before or as she is eating. May have missed a few doses here and there. However, symptoms are consistently much improved so she thinks that it's helping.    Reviewed low serum albumin levels. She is open to seeing a GI specialist nutritionist to talk about the low protein. Would want to know more details about the referral (location/provider) so she will be able to determine how much it would cost out of pocket so she can call her insurance.    Nurtec tastes minty! When she takes it, it keeps the headache from getting worse but doesn't seem to completely prevent it. She will take the medication when she is becoming sensitive to sound, which is a sign that the headache is departing from the chronic low-level headache. Is still taking propranolol regularly.     Current Outpatient Medications   Medication Sig Dispense Refill     AUROVELA 1.5/30 1.5-30 MG-MCG tablet " "Take 1 tablet by mouth daily 90 tablet 1     cetirizine (ZYRTEC) 10 MG tablet Take 10 mg by mouth daily       citalopram (CELEXA) 20 MG tablet Take 1 tablet (20 mg) by mouth daily 90 tablet 3     famotidine (PEPCID) 20 MG tablet Take 1 tablet (20 mg) by mouth 2 times daily 180 tablet 0     fluticasone (FLONASE) 50 MCG/ACT nasal spray Spray 1 spray into both nostrils daily       ibuprofen (ADVIL/MOTRIN) 200 MG tablet Take 200-400 mg by mouth every 6 hours as needed       levalbuterol (XOPENEX HFA) 45 MCG/ACT inhaler Inhale 1-2 puffs into the lungs every 6 hours as needed for wheezing 15 g 4     propranolol ER (INDERAL LA) 60 MG 24 hr capsule Take 1 capsule (60 mg) by mouth daily 90 capsule 3     Rimegepant Sulfate (NURTEC) 75 MG TBDP Take 75 mg by mouth daily as needed (migraine headache) Take at the first sign of migraine headache. 20 tablet 1     Allergies   Allergen Reactions     Codeine             Physical Exam:     There were no vitals taken for this visit.  Estimated body mass index is 41.72 kg/m  as calculated from the following:    Height as of 10/4/21: 1.742 m (5' 8.58\").    Weight as of 6/10/22: 126.6 kg (279 lb 1.3 oz).    GENERAL: Healthy, alert and no distress  EYES: Eyes grossly normal to inspection.  No discharge or erythema, or obvious scleral/conjunctival abnormalities.  RESP: Normal rate and effort.  SKIN: Visible skin clear. No significant rash, abnormal pigmentation or lesions.  NEURO: Mentation and speech appropriate for age.  PSYCH: Mentation appears normal, affect normal/bright, judgement and insight intact, normal speech and appearance well-groomed.        Assessment and Plan       ICD-10-CM    1. Chronic migraine without aura without status migrainosus, not intractable  G43.709 Rimegepant Sulfate (NURTEC) 75 MG TBDP   2. Nausea  R11.0    3. History of intussusception  Z87.19    4. Hypoalbuminemia  E88.09      1. Chronic migraine: Some improvement with Nurtec as abortive therapy but still " similar frequency of headaches and not fully preventing migraines even when taking at the onset of phonophobia.   - Ordered Nurtec 75 mg every other day to determine if preventive dosing will improve frequency/severity over abortive dosing.  - Continue propranolol for preventive dosing for now. May taper off in follow up if improvement with preventive dosing of Nurtec.   - Use other OTC meds and rest as needed for migraine headache treatment with onset of headache.    2. Nausea: Much improved on trial of H2 blocker. No ongoing pain and much less frequent nausea. Suspect GERD is a substantial contributor to this. Normal lab work-up aside from low albumin levels. Normal abdominal US aside from hepatic steatosis.  - Continue famotidine 20 mg BID for now. Could alter dosing or switch to PPI in follow up.     3. History of intussusception / Hypoalbuminemia: Persistently low levels of serum albumin over one month. Suspect that short gut syndrome may be at play here given intussusception history with extensive surgery in childhood. Eats a large amount of dietary protein.   - Will explore options for nutritionist referrals with post-op/GI expertise.    After Visit Information:  Patient chose to view AVS via [employer]      No follow-ups on file.      Video-Visit Details    Type of service:  Video Visit    Video End Time (time video stopped): 5:14 PM    Originating Location (pt. Location): Home    Distant Location (provider location):  [facility]     Platform used for Video Visit: Jay Groves MD

## 2022-07-01 NOTE — PATIENT INSTRUCTIONS
- Keep taking the famotidine twice daily. We could go up on your dose to 40 mg per dose once daily if it seems like your symptoms are worse at a particular time/meal, or switch to a different medication like omeprazole.     - Let's switch your Nurtec to every other day dosing (the prevention dosing) to see if this helps to disrupt the frequency and severity of your headaches. You would then use another medication if you do develop like ibuprofen or naproxen.     - If we improve your headaches with the Nurtec, we could try tapering down on the propranolol over time.     - I will look into the nutritionist consultation and get back to you.

## 2022-07-18 ENCOUNTER — MYC MEDICAL ADVICE (OUTPATIENT)
Dept: FAMILY MEDICINE | Facility: CLINIC | Age: 37
End: 2022-07-18

## 2022-07-18 DIAGNOSIS — E66.01 MORBID OBESITY (H): ICD-10-CM

## 2022-07-18 DIAGNOSIS — E88.09 HYPOALBUMINEMIA: ICD-10-CM

## 2022-07-18 DIAGNOSIS — Z87.19 HISTORY OF INTUSSUSCEPTION: Primary | ICD-10-CM

## 2022-07-25 ENCOUNTER — VIRTUAL VISIT (OUTPATIENT)
Dept: GASTROENTEROLOGY | Facility: CLINIC | Age: 37
End: 2022-07-25
Payer: COMMERCIAL

## 2022-07-25 DIAGNOSIS — Z87.19 HISTORY OF INTUSSUSCEPTION: Primary | ICD-10-CM

## 2022-07-25 DIAGNOSIS — E66.01 MORBID OBESITY (H): ICD-10-CM

## 2022-07-25 DIAGNOSIS — E88.09 HYPOALBUMINEMIA: ICD-10-CM

## 2022-07-25 PROCEDURE — 97802 MEDICAL NUTRITION INDIV IN: CPT | Mod: GT | Performed by: DIETITIAN, REGISTERED

## 2022-07-25 NOTE — LETTER
7/25/2022         RE: Lou Treviño  [address]  [place] [address]        Dear Colleague,    Thank you for referring your patient, Lou Treviño, to the [facility] GASTROENTEROLOGY CLINIC [place]. Please see a copy of my visit note below.    [facility] Outpatient Medical Nutrition Therapy      Time Spent:  55 minutes  Session Type:  Initial 2  Referring Physician:  Silvia Groves MD  Reason for RD Visit:   Intussusception hx, hypoalbuminemia. MD suspects short gut syndrome     Nutrition Assessment:  Patient is a 36 year old female with history that includes intussusception, hx extensive surgeries in childhood and MD noted suspected short gut syndrome, prediabetes, hepatic steatosis. She stated that many days, she has issues with an upset stomach. Previously was also having issues with chronic nausea but this has improved now that she is on pepcid twice per day. She has chronic loose stools, about 4-5 loose stools per day and from reviewing [facility] stool chart today, she reported that they are typically a 6 and some are a 7. She reported that lettuce, corn and if having a chunky tomato sauce with peels, then she will see pieces of these foods in stools. When her IBS flares, she can have an urgent need to have a bowel movement. On workdays, she will bring some snacks to eat for breakfast, then eats lunch and dinner meal. May have some cookies as an afternoon snacks if she did not eat enough at lunch. Sometimes she has an ice cream cone at night for dessert. She drinks about 2x 16 oz bottles water per day and 1-2 bottles of Snapple juice drinks per day. She likes to have something else beside water. She doesn't like the taste of sugar substitutes either. She cannot drink caffeinated beverages because they give her migraines. She does not drink alcohol either. She also reported eating a lot of protein in her diet    Patient Active Problem List   Diagnosis     Mild persistent  "asthma     Migraines     IBS (irritable bowel syndrome)     Anxiety and depression     Allergies     History of intussusception     Family history of factor V deficiency     Generalized anxiety disorder     Healthcare maintenance     Prediabetes     Morbid obesity (H)     Nausea     Hypoalbuminemia     Hepatic steatosis     Height:   Ht Readings from Last 1 Encounters:   10/04/21 1.742 m (5' 8.58\")     Weight: She reported that her weight stays within about 5 lbs.  Wt Readings from Last 10 Encounters:   06/10/22 126.6 kg (279 lb 1.3 oz)   05/27/22 127 kg (280 lb)   10/04/21 124.7 kg (275 lb)   06/21/21 123.4 kg (272 lb)     BMI: Estimated body mass index is 41.72 kg/m  as calculated from the following:    Height as of 10/4/21: 1.742 m (5' 8.58\").    Weight as of 6/10/22: 126.6 kg (279 lb 1.3 oz).    Diet Recall:  (some usual/recent meals/snacks/beverages): on workdays, snacks at B, lunch and dinner wth partner or on weekends lunch and dinner. Partner sensitve to pork. Can't drink caffeine b/c ishe gets migraines  Meal Food    Breakfast On workdays: microwave B sandwich or cheese and meat stick and chips   Lunch On workdays: sometimes leftovers or recently lunchables this week or on weekends: cooks a meal or leftovers   Dinner Pasta with meat (beef or chicken), sometimes veg if in the house or soft shell tacos (meat and cheese)    Snacks On workday: sometimes has a few cookies in afternoon or nuts or None. In evening occas has ice cream cone since it is summer.   Beverages 33 oz Water and 1-2 bottles Snapple juice drinks   Alcohol Intake none     Frequency of eating/taking out meals: previously 1-2 tmes per week but less lately due to partner currently not working.     Labs:    Last Comprehensive Metabolic Panel:  Sodium   Date Value Ref Range Status   05/27/2022 137 136 - 145 mmol/L Final     Potassium   Date Value Ref Range Status   05/27/2022 4.0 3.5 - 5.0 mmol/L Final     Chloride   Date Value Ref Range Status "   05/27/2022 104 98 - 107 mmol/L Final     Carbon Dioxide (CO2)   Date Value Ref Range Status   05/27/2022 22 22 - 31 mmol/L Final     Anion Gap   Date Value Ref Range Status   05/27/2022 11 5 - 18 mmol/L Final     Glucose   Date Value Ref Range Status   05/27/2022 83 70 - 125 mg/dL Final     Urea Nitrogen   Date Value Ref Range Status   05/27/2022 13 8 - 22 mg/dL Final     Creatinine   Date Value Ref Range Status   05/27/2022 0.68 0.60 - 1.10 mg/dL Final     GFR Estimate   Date Value Ref Range Status   05/27/2022 >90 >60 mL/min/1.73m2 Final     Comment:     Effective December 21, 2021 eGFRcr in adults is calculated using the 2021 CKD-EPI creatinine equation which includes age and gender (Kartik et al., NEJ, DOI: 10.1056/KEHYqu6065405)     Calcium   Date Value Ref Range Status   05/27/2022 9.5 8.5 - 10.5 mg/dL Final     CBC RESULTS: No results for input(s): WBC, RBC, HGB, HCT, MCV, MCH, MCHC, RDW, PLT in the last 81258 hours.    Pertinent Medications/vitamin and mineral supplements:     Current Outpatient Medications   Medication     AUROVELA 1.5/30 1.5-30 MG-MCG tablet     cetirizine (ZYRTEC) 10 MG tablet     citalopram (CELEXA) 20 MG tablet     famotidine (PEPCID) 20 MG tablet     fluticasone (FLONASE) 50 MCG/ACT nasal spray     ibuprofen (ADVIL/MOTRIN) 200 MG tablet     levalbuterol (XOPENEX HFA) 45 MCG/ACT inhaler     propranolol ER (INDERAL LA) 60 MG 24 hr capsule     Rimegepant Sulfate (NURTEC) 75 MG TBDP     No current facility-administered medications for this visit.       Food Allergies:  NKFA     MALNUTRITION:  % Weight Loss:  None noted  % Intake:  No decreased intake noted  Subcutaneous Fat Loss:  None observed  Muscle Loss:  None observed  Fluid Retention:  None noted    Malnutrition Diagnosis: Patient does not meet two of the above criteria necessary for diagnosing malnutrition  In Context of:  Chronic illness or disease    Nutrition Prescription: IBS, short gut tips and increased protein    Nutrition  Intervention      Provided diet education for IBS, c/o stomach upset and looser stools and possible short gut syndrome as well as hypoalbuminemia.     Patient verbalized understanding of education provided and will focus on goals discussed at visit today. See Goals below.     Goals:    1. Eating multiple smaller meals (such as 4-6 smaller meals per day) are usually better tolerated than eating fewer larger meals per day.  --do not overeat at a meal. It's better to break up a meal into two smaller meals versus eating one larger meal at one time.     2. Limit foods and beverages with added sugars.    --especially limit/avoid those foods and beverages sweetened with high fructose corn syrup, honey, agave syrup or crystalline fructose.     --Discontinue drinking the Snapple Juice drinks. If you want some juice or something other than water, you could make some infused water by adding in fruit or vegetable slices into water to naturally flavor it. You can put sliced fruit or vegetables into water and keep in a pitcher in the refrigerator to readily have available too.    --If you want to have some juice than choose 100% juice and limit to no more than 4 oz per day (can add a small amount of juice into some water to give it some flavor. Some examples include diet cranberry juice or 100% grape juice (just limit the portion to 1/2 cup or less per day as this may contribute to some loose stools for you.)    --Also avoid foods and beverages sweetened with sugar alcohols such as Mannitol, maltitol, xylitol, sorbitol, erythritol.    3. Increase water to drink at least 48 oz-64 oz per day. (This includes infused water).    4. Can keep daily food and beverage journals and track any symptoms you experience to see if you can identify any specific food or beverage triggers for symptoms. Some apps for tracking symptoms and intake include My Symptoms Tracker or [employer] nusrat. Or can keep in a notebook if you prefer.    5. Gradually  increase fiber in your diet especially some good soluble fiber foods sources such as oats, avocado, nuts, ran seeds, ground flaxseed, carrots, pears, apples/applesauce, sweet potato, potatoes, broccoli, brussels sprouts.    --cooked vegetables may be better tolerated than raw.    6. Can start a fiber supplement such as powdered citrucel or metamucil. Start with a taking 1 tablespoon per day mixed into 8 ounces of water and can increase up to 2-3 tablespoons per day as tolerated. If you have improvement in your loose stools from just taking 1 tablespoon per day, then okay to have just the 1 tablespoon per day.      As you increase fiber in your diet and/or take fiber supplements, it's important to make sure that you are drinking adequate amounts of water.    7. Continue including good sources of protein at each meal. Can have a protein drink as a snack to get some additional protein. Some examples of higher protein, lower sugar drinks include Premier Protein, Pure Protein or can make your own drink using protein powder and blending with lowfat milk, milk substitute or water.    Nutrition Monitoring and Evaluation: Will monitor adherence to nutrition recommendations at future RD visits.     Further Medical Nutrition Therapy:  Follow-up in 1-2 months    Patient was encouraged to call/contact RD with any further questions.      Maria E Boss, MS, RD, LD

## 2022-07-25 NOTE — PATIENT INSTRUCTIONS
It was nice meeting you today. Below are the nutrition recommendations we discussed at your visit.    Please let me know if you have any additional questions.    Nutrition Recommendations    Eating multiple smaller meals (such as 4-6 smaller meals per day) are usually better tolerated than eating fewer larger meals per day.  --do not overeat at a meal. It's better to break up a meal into two smaller meals versus eating one larger meal at one time.     Limit foods and beverages with added sugars especially those sweetened with high fructose corn syrup, honey, agave syrup or crystalline fructose.     --Discontinue drinking the Snapple Juice drinks. If you want some juice or something other than water, you could make some infused water by adding in fruit or vegetable slices into naturally flavor water. You can put sliced fruit or vegetables into water and keep in a pitcher in the refrigerator to readily have available too.    --If you want to have some juice than choose 100% juice and limit to no more than 4 oz per day (can add a small amount of juice into some water to give it some flavor. Some examples include diet cranberry juice or 100% grape juice (just limit the portion to 1/2 cup or less per day as this may contribute to some loose stools for you.)    --Also avoid foods and beverages sweetened with sugar alcohols such as Mannitol, maltitol, xylitol, sorbitol, erythritol.    Increase water to drink at least 48 oz-64 oz per day. (This includes infused water).    Can keep daily food and beverage journals and track any symptoms you experience to see if you can identify any specific food or beverage triggers for symptoms. Some apps for tracking symptoms and intake include My Symptoms Tracker or [employer] nusrat. Or can keep in a notebook if you prefer.    Gradually increase fiber in your diet especially some good soluble fiber foods sources such as oats, avocado, nuts, ran seeds, ground flaxseed, carrots, pears,  apples/applesauce, sweet potato, potatoes, broccoli, brussels sprouts, banana.    --cooked vegetables will likely be better tolerated than raw.    Can start a fiber supplement such as powdered citrucel or metamucil. Start with a taking 1 tablespoon per day mixed into 8 ounces of water and can increase up to 2-3 tablespoons per day as tolerated. If you have improvement in your loose stools from just taking 1 tablespoon per day, then okay to have just the 1 tablespoon per day.    As you increase fiber in your diet and/or take fiber supplements, it's important to make sure that you are drinking adequate amounts of water.    Continue including good sources of protein at each meal. Can have a protein drink as a snack to get some additional protein. Some examples of higher protein, lower sugar drinks include Premier Protein, Pure Protein or can make your own drink using protein powder and blending with lowfat milk, milk substitute or water.    Follow up in 1-2 months.    For follow up appointments, please call [phone].    Thank you,    Maria E Boss, MS, RD, LD

## 2022-07-25 NOTE — PROGRESS NOTES
Valorie is a 36 year old who is being evaluated via a billable video visit.      How would you like to obtain your AVS? MyChart  If the video visit is dropped, the invitation should be resent by: Text to cell phone: [phone]  Will anyone else be joining your video visit? No    Video-Visit Details    Video Start Time: 2:59 PM    Type of service:  Video Visit    Video End Time:3:54 PM    Originating Location (pt. Location): Home    Distant Location (provider location):  [facility]STRO[facility]OLOG[facility] [place]     Platform used for Video Visit: [employer]     [facility] Outpatient Medical Nutrition Therapy      Time Spent:  55 minutes  Session Type:  Initial 2  Referring Physician:  Silvia Groves MD  Reason for RD Visit:   Intussusception hx, hypoalbuminemia. MD suspects short gut syndrome     Nutrition Assessment:  Patient is a 36 year old female with history that includes intussusception, hx extensive surgeries in childhood and MD noted suspected short gut syndrome, prediabetes, hepatic steatosis. She stated that many days, she has issues with an upset stomach. Previously was also having issues with chronic nausea but this has improved now that she is on pepcid twice per day. She has chronic loose stools, about 4-5 loose stools per day and from reviewing [facility] stool chart today, she reported that they are typically a 6 and some are a 7. She reported that lettuce, corn and if having a chunky tomato sauce with peels, then she will see pieces of these foods in stools. When her IBS flares, she can have an urgent need to have a bowel movement. On workdays, she will bring some snacks to eat for breakfast, then eats lunch and dinner meal. May have some cookies as an afternoon snacks if she did not eat enough at lunch. Sometimes she has an ice cream cone at night for dessert. She drinks about 2x 16 oz bottles water per day and 1-2 bottles of Snapple juice drinks per day. She likes to have  "something else beside water. She doesn't like the taste of sugar substitutes either. She cannot drink caffeinated beverages because they give her migraines. She does not drink alcohol either. She also reported eating a lot of protein in her diet    Patient Active Problem List   Diagnosis     Mild persistent asthma     Migraines     IBS (irritable bowel syndrome)     Anxiety and depression     Allergies     History of intussusception     Family history of factor V deficiency     Generalized anxiety disorder     Healthcare maintenance     Prediabetes     Morbid obesity (H)     Nausea     Hypoalbuminemia     Hepatic steatosis     Height:   Ht Readings from Last 1 Encounters:   10/04/21 1.742 m (5' 8.58\")     Weight: She reported that her weight stays within about 5 lbs.  Wt Readings from Last 10 Encounters:   06/10/22 126.6 kg (279 lb 1.3 oz)   05/27/22 127 kg (280 lb)   10/04/21 124.7 kg (275 lb)   06/21/21 123.4 kg (272 lb)     BMI: Estimated body mass index is 41.72 kg/m  as calculated from the following:    Height as of 10/4/21: 1.742 m (5' 8.58\").    Weight as of 6/10/22: 126.6 kg (279 lb 1.3 oz).    Diet Recall:  (some usual/recent meals/snacks/beverages): on workdays, snacks at B, lunch and dinner wth partner or on weekends lunch and dinner. Partner sensitve to pork. Can't drink caffeine b/c ishe gets migraines  Meal Food    Breakfast On workdays: microwave B sandwich or cheese and meat stick and chips   Lunch On workdays: sometimes leftovers or recently lunchables this week or on weekends: cooks a meal or leftovers   Dinner Pasta with meat (beef or chicken), sometimes veg if in the house or soft shell tacos (meat and cheese)    Snacks On workday: sometimes has a few cookies in afternoon or nuts or None. In evening occas has ice cream cone since it is summer.   Beverages 33 oz Water and 1-2 bottles Snapple juice drinks   Alcohol Intake none     Frequency of eating/taking out meals: previously 1-2 tmes per week " but less lately due to partner currently not working.     Labs:    Last Comprehensive Metabolic Panel:  Sodium   Date Value Ref Range Status   05/27/2022 137 136 - 145 mmol/L Final     Potassium   Date Value Ref Range Status   05/27/2022 4.0 3.5 - 5.0 mmol/L Final     Chloride   Date Value Ref Range Status   05/27/2022 104 98 - 107 mmol/L Final     Carbon Dioxide (CO2)   Date Value Ref Range Status   05/27/2022 22 22 - 31 mmol/L Final     Anion Gap   Date Value Ref Range Status   05/27/2022 11 5 - 18 mmol/L Final     Glucose   Date Value Ref Range Status   05/27/2022 83 70 - 125 mg/dL Final     Urea Nitrogen   Date Value Ref Range Status   05/27/2022 13 8 - 22 mg/dL Final     Creatinine   Date Value Ref Range Status   05/27/2022 0.68 0.60 - 1.10 mg/dL Final     GFR Estimate   Date Value Ref Range Status   05/27/2022 >90 >60 mL/min/1.73m2 Final     Comment:     Effective December 21, 2021 eGFRcr in adults is calculated using the 2021 CKD-EPI creatinine equation which includes age and gender (Kartik et al., NEJ, DOI: 10.1056/IQTUhd2730450)     Calcium   Date Value Ref Range Status   05/27/2022 9.5 8.5 - 10.5 mg/dL Final     CBC RESULTS: No results for input(s): WBC, RBC, HGB, HCT, MCV, MCH, MCHC, RDW, PLT in the last 43568 hours.    Pertinent Medications/vitamin and mineral supplements:     Current Outpatient Medications   Medication     AUROVELA 1.5/30 1.5-30 MG-MCG tablet     cetirizine (ZYRTEC) 10 MG tablet     citalopram (CELEXA) 20 MG tablet     famotidine (PEPCID) 20 MG tablet     fluticasone (FLONASE) 50 MCG/ACT nasal spray     ibuprofen (ADVIL/MOTRIN) 200 MG tablet     levalbuterol (XOPENEX HFA) 45 MCG/ACT inhaler     propranolol ER (INDERAL LA) 60 MG 24 hr capsule     Rimegepant Sulfate (NURTEC) 75 MG TBDP     No current facility-administered medications for this visit.       Food Allergies:  NKFA     MALNUTRITION:  % Weight Loss:  None noted  % Intake:  No decreased intake noted  Subcutaneous Fat Loss:  None  observed  Muscle Loss:  None observed  Fluid Retention:  None noted    Malnutrition Diagnosis: Patient does not meet two of the above criteria necessary for diagnosing malnutrition  In Context of:  Chronic illness or disease    Nutrition Prescription: IBS, short gut tips and increased protein    Nutrition Intervention      Provided diet education for IBS, c/o stomach upset and looser stools and possible short gut syndrome as well as hypoalbuminemia.     Patient verbalized understanding of education provided and will focus on goals discussed at visit today. See Goals below.     Goals:    1. Eating multiple smaller meals (such as 4-6 smaller meals per day) are usually better tolerated than eating fewer larger meals per day.  --do not overeat at a meal. It's better to break up a meal into two smaller meals versus eating one larger meal at one time.     2. Limit foods and beverages with added sugars.    --especially limit/avoid those foods and beverages sweetened with high fructose corn syrup, honey, agave syrup or crystalline fructose.     --Discontinue drinking the Snapple Juice drinks. If you want some juice or something other than water, you could make some infused water by adding in fruit or vegetable slices into water to naturally flavor it. You can put sliced fruit or vegetables into water and keep in a pitcher in the refrigerator to readily have available too.    --If you want to have some juice than choose 100% juice and limit to no more than 4 oz per day (can add a small amount of juice into some water to give it some flavor. Some examples include diet cranberry juice or 100% grape juice (just limit the portion to 1/2 cup or less per day as this may contribute to some loose stools for you.)    --Also avoid foods and beverages sweetened with sugar alcohols such as Mannitol, maltitol, xylitol, sorbitol, erythritol.    3. Increase water to drink at least 48 oz-64 oz per day. (This includes infused  water).    4. Can keep daily food and beverage journals and track any symptoms you experience to see if you can identify any specific food or beverage triggers for symptoms. Some apps for tracking symptoms and intake include My Symptoms Tracker or [employer] nusrat. Or can keep in a notebook if you prefer.    5. Gradually increase fiber in your diet especially some good soluble fiber foods sources such as oats, avocado, nuts, ran seeds, ground flaxseed, carrots, pears, apples/applesauce, sweet potato, potatoes, broccoli, brussels sprouts.    --cooked vegetables may be better tolerated than raw.    6. Can start a fiber supplement such as powdered citrucel or metamucil. Start with a taking 1 tablespoon per day mixed into 8 ounces of water and can increase up to 2-3 tablespoons per day as tolerated. If you have improvement in your loose stools from just taking 1 tablespoon per day, then okay to have just the 1 tablespoon per day.      As you increase fiber in your diet and/or take fiber supplements, it's important to make sure that you are drinking adequate amounts of water.    7. Continue including good sources of protein at each meal. Can have a protein drink as a snack to get some additional protein. Some examples of higher protein, lower sugar drinks include Premier Protein, Pure Protein or can make your own drink using protein powder and blending with lowfat milk, milk substitute or water.    Nutrition Monitoring and Evaluation: Will monitor adherence to nutrition recommendations at future RD visits.     Further Medical Nutrition Therapy:  Follow-up in 1-2 months    Patient was encouraged to call/contact RD with any further questions.    Maria E Boss, MS, RD, LD

## 2022-08-19 ENCOUNTER — MYC MEDICAL ADVICE (OUTPATIENT)
Dept: FAMILY MEDICINE | Facility: CLINIC | Age: 37
End: 2022-08-19

## 2022-10-04 ENCOUNTER — MYC MEDICAL ADVICE (OUTPATIENT)
Dept: FAMILY MEDICINE | Facility: CLINIC | Age: 37
End: 2022-10-04

## 2022-10-04 DIAGNOSIS — F41.9 ANXIETY AND DEPRESSION: ICD-10-CM

## 2022-10-04 DIAGNOSIS — F32.A ANXIETY AND DEPRESSION: ICD-10-CM

## 2022-10-04 DIAGNOSIS — Z30.41 SURVEILLANCE OF PREVIOUSLY PRESCRIBED CONTRACEPTIVE PILL: Primary | ICD-10-CM

## 2022-10-04 DIAGNOSIS — F41.1 GENERALIZED ANXIETY DISORDER: ICD-10-CM

## 2022-10-05 RX ORDER — CITALOPRAM HYDROBROMIDE 20 MG/1
20 TABLET ORAL DAILY
Qty: 90 TABLET | Refills: 2 | Status: SHIPPED | OUTPATIENT
Start: 2022-10-05 | End: 2023-04-26

## 2022-11-19 ENCOUNTER — HEALTH MAINTENANCE LETTER (OUTPATIENT)
Age: 37
End: 2022-11-19

## 2022-12-05 DIAGNOSIS — F41.9 ANXIETY AND DEPRESSION: ICD-10-CM

## 2022-12-05 DIAGNOSIS — F32.A ANXIETY AND DEPRESSION: ICD-10-CM

## 2022-12-05 DIAGNOSIS — Z30.41 SURVEILLANCE OF PREVIOUSLY PRESCRIBED CONTRACEPTIVE PILL: ICD-10-CM

## 2022-12-05 DIAGNOSIS — G43.709 CHRONIC MIGRAINE WITHOUT AURA WITHOUT STATUS MIGRAINOSUS, NOT INTRACTABLE: ICD-10-CM

## 2022-12-05 DIAGNOSIS — F41.1 GENERALIZED ANXIETY DISORDER: ICD-10-CM

## 2022-12-05 RX ORDER — PROPRANOLOL HCL 60 MG
60 CAPSULE, EXTENDED RELEASE 24HR ORAL DAILY
Qty: 90 CAPSULE | Refills: 3 | Status: SHIPPED | OUTPATIENT
Start: 2022-12-05 | End: 2023-04-26

## 2022-12-05 RX ORDER — CITALOPRAM HYDROBROMIDE 20 MG/1
20 TABLET ORAL DAILY
Qty: 90 TABLET | Refills: 1 | Status: SHIPPED | OUTPATIENT
Start: 2022-12-05 | End: 2023-04-26

## 2022-12-05 RX ORDER — CITALOPRAM HYDROBROMIDE 20 MG/1
20 TABLET ORAL DAILY
Qty: 90 TABLET | Status: CANCELLED | OUTPATIENT
Start: 2022-12-05

## 2023-02-14 ENCOUNTER — MYC REFILL (OUTPATIENT)
Dept: FAMILY MEDICINE | Facility: CLINIC | Age: 38
End: 2023-02-14
Payer: COMMERCIAL

## 2023-02-14 DIAGNOSIS — R10.84 GENERALIZED POSTPRANDIAL ABDOMINAL PAIN: ICD-10-CM

## 2023-02-14 DIAGNOSIS — R11.0 NAUSEA: ICD-10-CM

## 2023-02-15 RX ORDER — FAMOTIDINE 20 MG/1
20 TABLET, FILM COATED ORAL 2 TIMES DAILY
Qty: 180 TABLET | Refills: 0 | Status: SHIPPED | OUTPATIENT
Start: 2023-02-15 | End: 2023-07-27

## 2023-04-20 ASSESSMENT — ENCOUNTER SYMPTOMS
CHILLS: 0
COUGH: 1
DIZZINESS: 0
PARESTHESIAS: 0
BREAST MASS: 0
SORE THROAT: 1
PALPITATIONS: 0
HEARTBURN: 0
HEMATOCHEZIA: 0
CONSTIPATION: 0
HEADACHES: 0
ABDOMINAL PAIN: 0
MYALGIAS: 0
DIARRHEA: 0
FREQUENCY: 0
WEAKNESS: 0
JOINT SWELLING: 0
NERVOUS/ANXIOUS: 0
SHORTNESS OF BREATH: 0
HEMATURIA: 0
FEVER: 0
EYE PAIN: 0
DYSURIA: 0
NAUSEA: 0
ARTHRALGIAS: 1

## 2023-04-26 ENCOUNTER — OFFICE VISIT (OUTPATIENT)
Dept: FAMILY MEDICINE | Facility: CLINIC | Age: 38
End: 2023-04-26
Payer: COMMERCIAL

## 2023-04-26 VITALS
RESPIRATION RATE: 20 BRPM | OXYGEN SATURATION: 98 % | HEIGHT: 69 IN | WEIGHT: 274 LBS | TEMPERATURE: 98.1 F | HEART RATE: 70 BPM | BODY MASS INDEX: 40.58 KG/M2

## 2023-04-26 DIAGNOSIS — J45.30 MILD PERSISTENT ASTHMA WITHOUT COMPLICATION: ICD-10-CM

## 2023-04-26 DIAGNOSIS — G43.709 CHRONIC MIGRAINE WITHOUT AURA WITHOUT STATUS MIGRAINOSUS, NOT INTRACTABLE: ICD-10-CM

## 2023-04-26 DIAGNOSIS — M22.2X2 PATELLOFEMORAL PAIN SYNDROME OF LEFT KNEE: ICD-10-CM

## 2023-04-26 DIAGNOSIS — F41.9 ANXIETY AND DEPRESSION: ICD-10-CM

## 2023-04-26 DIAGNOSIS — Z00.00 ROUTINE GENERAL MEDICAL EXAMINATION AT A HEALTH CARE FACILITY: Primary | ICD-10-CM

## 2023-04-26 DIAGNOSIS — F32.A ANXIETY AND DEPRESSION: ICD-10-CM

## 2023-04-26 PROCEDURE — 99213 OFFICE O/P EST LOW 20 MIN: CPT | Mod: 25 | Performed by: STUDENT IN AN ORGANIZED HEALTH CARE EDUCATION/TRAINING PROGRAM

## 2023-04-26 PROCEDURE — 99395 PREV VISIT EST AGE 18-39: CPT | Mod: GC | Performed by: STUDENT IN AN ORGANIZED HEALTH CARE EDUCATION/TRAINING PROGRAM

## 2023-04-26 RX ORDER — RIMEGEPANT SULFATE 75 MG/75MG
75 TABLET, ORALLY DISINTEGRATING ORAL
Qty: 45 TABLET | Refills: 1 | Status: SHIPPED | OUTPATIENT
Start: 2023-04-26 | End: 2024-08-06

## 2023-04-26 RX ORDER — FLUTICASONE PROPIONATE 50 MCG
1 SPRAY, SUSPENSION (ML) NASAL DAILY
Qty: 18.2 ML | Refills: 0 | Status: SHIPPED | OUTPATIENT
Start: 2023-04-26

## 2023-04-26 RX ORDER — PROPRANOLOL HCL 60 MG
60 CAPSULE, EXTENDED RELEASE 24HR ORAL DAILY
Qty: 90 CAPSULE | Refills: 3 | Status: SHIPPED | OUTPATIENT
Start: 2023-04-26 | End: 2024-04-15

## 2023-04-26 RX ORDER — CITALOPRAM HYDROBROMIDE 20 MG/1
20 TABLET ORAL DAILY
Qty: 90 TABLET | Refills: 1 | Status: SHIPPED | OUTPATIENT
Start: 2023-04-26 | End: 2023-11-06

## 2023-04-26 RX ORDER — CETIRIZINE HYDROCHLORIDE 10 MG/1
10 TABLET ORAL DAILY
Qty: 90 TABLET | Refills: 1 | Status: SHIPPED | OUTPATIENT
Start: 2023-04-26

## 2023-04-26 ASSESSMENT — ASTHMA QUESTIONNAIRES
QUESTION_5 LAST FOUR WEEKS HOW WOULD YOU RATE YOUR ASTHMA CONTROL: WELL CONTROLLED
QUESTION_4 LAST FOUR WEEKS HOW OFTEN HAVE YOU USED YOUR RESCUE INHALER OR NEBULIZER MEDICATION (SUCH AS ALBUTEROL): NOT AT ALL
ACT_TOTALSCORE: 21
ACT_TOTALSCORE: 21
QUESTION_1 LAST FOUR WEEKS HOW MUCH OF THE TIME DID YOUR ASTHMA KEEP YOU FROM GETTING AS MUCH DONE AT WORK, SCHOOL OR AT HOME: A LITTLE OF THE TIME
QUESTION_3 LAST FOUR WEEKS HOW OFTEN DID YOUR ASTHMA SYMPTOMS (WHEEZING, COUGHING, SHORTNESS OF BREATH, CHEST TIGHTNESS OR PAIN) WAKE YOU UP AT NIGHT OR EARLIER THAN USUAL IN THE MORNING: ONCE OR TWICE
QUESTION_2 LAST FOUR WEEKS HOW OFTEN HAVE YOU HAD SHORTNESS OF BREATH: ONCE OR TWICE A WEEK

## 2023-04-26 ASSESSMENT — PATIENT HEALTH QUESTIONNAIRE - PHQ9: SUM OF ALL RESPONSES TO PHQ QUESTIONS 1-9: 10

## 2023-04-26 NOTE — PROGRESS NOTES
Answers for HPI/ROS submitted by the patient on 4/20/2023  Frequency of exercise:: None  Getting at least 3 servings of Calcium per day:: Yes  Diet:: Regular (no restrictions)  Taking medications regularly:: Yes  Medication side effects:: Not applicable  Bi-annual eye exam:: NO  Dental care twice a year:: NO  Sleep apnea or symptoms of sleep apnea:: Daytime drowsiness  abdominal pain: No  Blood in stool: No  Blood in urine: No  chest pain: No  chills: No  congestion: Yes  constipation: No  cough: Yes  diarrhea: No  dizziness: No  ear pain: No  eye pain: No  nervous/anxious: No  fever: No  frequency: No  genital sores: No  headaches: No  hearing loss: No  heartburn: No  arthralgias: Yes  joint swelling: No  peripheral edema: No  mood changes: No  myalgias: No  nausea: No  dysuria: No  palpitations: No  Skin sensation changes: No  sore throat: Yes  urgency: No  rash: No  shortness of breath: No  visual disturbance: No  weakness: No  pelvic pain: No  vaginal bleeding: No  vaginal discharge: No  tenderness: No  breast mass: No  breast discharge: No  Additional concerns today:: Yes       SUBJECTIVE:   CC: Valorie is an 37 year old who presents for preventive health visit.        View : No data to display.            Patient has been advised of split billing requirements and indicates understanding: Yes     HPI     Left knee pain  - Progressively worse over the last year, no known trauma that she remembers   - Pain with stairs and progressed to be painful with walking, pain at rest varies   - Stabbing pain over knee cap   - No radiation of pain, does have the feeling that her knee will give out when going down stairs    Today's PHQ-2 Score:       4/20/2023    10:05 PM   PHQ-2 ( 1999 [employer])   Q1: Little interest or pleasure in doing things 1   Q2: Feeling down, depressed or hopeless 0   PHQ-2 Score 1   Q1: Little interest or pleasure in doing things Several days   Q2: Feeling down, depressed or hopeless Not at all   PHQ-2  "Score 1       Social History     Tobacco Use     Smoking status: Never     Smokeless tobacco: Never   Vaping Use     Vaping status: Not on file   Substance Use Topics     Alcohol use: Never         4/20/2023    10:05 PM   Alcohol Use   Prescreen: >3 drinks/day or >7 drinks/week? Not Applicable     Reviewed orders with patient.  Reviewed health maintenance and updated orders accordingly - Yes  Lab work is in process    FHS-7:        View : No data to display.              History of abnormal Pap smear: NO - age 30-65 PAP every 5 years with negative HPV co-testing recommended     Reviewed and updated as needed this visit by clinical staff    Allergies  Meds  Problems             Reviewed and updated as needed this visit by Provider     Meds  Problems            Past Surgical History:   Procedure Laterality Date     INTUSSUSCEPTION REPAIR  1986       Review of Systems  CONSTITUTIONAL: NEGATIVE for fever, chills, change in weight  INTEGUMENTARU/SKIN: NEGATIVE for worrisome rashes, moles or lesions  EYES: NEGATIVE for vision changes or irritation  ENT: NEGATIVE for ear, mouth and throat problems  BREAST: NEGATIVE for masses, tenderness or discharge  CV: NEGATIVE for chest pain, palpitations or peripheral edema  GI: Loose stools, depends on diet   MUSCULOSKELETAL:Left knee pain   NEURO: NEGATIVE for weakness, dizziness or paresthesias  PSYCHIATRIC: Sometimes      OBJECTIVE:   Pulse 70   Temp 98.1  F (36.7  C) (Oral)   Resp 20   Ht 1.753 m (5' 9\")   Wt 124.3 kg (274 lb)   SpO2 98%   BMI 40.46 kg/m    Physical Exam  GENERAL: healthy, alert and no distress  EYES: Eyes grossly normal to inspection, PERRL and conjunctivae and sclerae normal  HENT: ear canals and TM's normal, nose and mouth without ulcers or lesions  NECK: no adenopathy, no asymmetry, masses, or scars and thyroid normal to palpation  RESP: lungs clear to auscultation - no rales, rhonchi or wheezes  BREAST: normal without masses, tenderness or nipple " discharge and no palpable axillary masses or adenopathy  CV: regular rate and rhythm, normal S1 S2, no S3 or S4, no murmur, click or rub, no peripheral edema and peripheral pulses strong  ABDOMEN: soft, nontender, no hepatosplenomegaly, no masses and bowel sounds normal  MS: no gross musculoskeletal defects noted, no edema  SKIN: no suspicious lesions or rashes  NEURO: Normal strength and tone, mentation intact and speech normal  PSYCH: mentation appears normal, affect normal/bright    Diagnostic Test Results:  Labs reviewed in Epic    ASSESSMENT/PLAN:   (Z00.00) Routine general medical examination at a health care facility  (primary encounter diagnosis)  Comment: Patient due for PAP in October and will come back at that time for the PAP, would recommend rechecking A1C at that time. Updated family history.   Plan: Counseled on healthy lifestyle choices    (G43.709) Chronic migraine without aura without status migrainosus, not intractable  Comment: Refill.   Plan: rimegepant (NURTEC) 75 MG ODT tablet,         propranolol ER (INDERAL LA) 60 MG 24 hr capsule    (M22.2X2) Patellofemoral pain syndrome of left knee  Comment: Around 1 year of progressive knee pain, started as pain with going down stairs and now with pain with walking and minimal pain at rest. On exam positive McMurrys, no tenderness along the joint line, there could be a meniscal injury also contributing but overall fits patellofemoral pain syndrome and will refer to PT. If not improved after PT could consider ortho referral.   Plan: Physical Therapy Referral    (F41.9,  F32.A) Anxiety and depression  Comment: Refill.   Plan: citalopram (CELEXA) 20 MG tablet    (J45.30) Mild persistent asthma without complication  Comment: Refill.   Plan: cetirizine (ZYRTEC) 10 MG tablet, fluticasone         (FLONASE) 50 MCG/ACT nasal spray      Patient has been advised of split billing requirements and indicates understanding: Yes      COUNSELING:  Reviewed preventive  health counseling, as reflected in patient instructions       Regular exercise       Healthy diet/nutrition       Contraception        She reports that she has never smoked. She has never used smokeless tobacco.    Mellissa Jimenez MD  [facility]

## 2023-04-27 ENCOUNTER — TELEPHONE (OUTPATIENT)
Dept: FAMILY MEDICINE | Facility: CLINIC | Age: 38
End: 2023-04-27
Payer: COMMERCIAL

## 2023-04-27 NOTE — TELEPHONE ENCOUNTER
Prior Authorization needed on:  23    Medication:  Nurtec Dose:  75mg    Pharmacy confirmed as   [id number] - [place] - [address]  [address] [place] [address]  Phone: [phone] Fax: [phone]  : Yes    Insurance Name:    Insurance Phone:   Insurance Patient ID:     CMM:  Key: [id number]  PLN: NICOLE PORTILLO [id number]    Alternatives Suggested:      Catrina Traylor MA 2023 at 8:57 AM

## 2023-05-02 NOTE — TELEPHONE ENCOUNTER
Central Prior Authorization Team   Phone: [phone]    PA Initiation    Medication: Nurtec 75mg  Insurance Company: [employer] - Private Location App - Phone [phone] Fax [phone]  Pharmacy Filling the Rx: [id number] - [place], [place] - [address]  Filling Pharmacy Phone: [phone]  Filling Pharmacy Fax:    Start Date: 5/2/2023

## 2023-05-04 NOTE — TELEPHONE ENCOUNTER
Prior Authorization Not Needed per Insurance    Medication: Nurtec 75mg-PA NOT NEEDED   Insurance Company: [employer] - Phone [phone] Fax [phone]  Expected CoPay:      Pharmacy Filling the Rx: [id number] [place], [place] - [address] [place] [address]  Pharmacy Notified: Yes  Patient Notified: No    Called pharmacy and pharmacy stated that PA is Not Needed and medication is covered. Pharmacy stated that they have a paid claim on medication and patient has not picked up medication; as result, pharmacy had placed it back. Requested pharmacy to process medication and notify the patient when medication is ready for . Pharmacy stated that they hold filled medications for 10 days only. If patient do not  medication, medication gets put back. Insurance also stated that PA is Not Needed and medication is covered.

## 2023-05-05 NOTE — PROGRESS NOTES
Faculty Supervision of Residents   I have examined this patient and the medical care has been evaluated and discussed with the resident. See resident note outlining our discussion.      Donna West MD

## 2023-05-16 ENCOUNTER — THERAPY VISIT (OUTPATIENT)
Dept: PHYSICAL THERAPY | Facility: CLINIC | Age: 38
End: 2023-05-16
Attending: STUDENT IN AN ORGANIZED HEALTH CARE EDUCATION/TRAINING PROGRAM
Payer: COMMERCIAL

## 2023-05-16 DIAGNOSIS — M25.562 LEFT KNEE PAIN: Primary | ICD-10-CM

## 2023-05-16 DIAGNOSIS — M22.2X2 PATELLOFEMORAL PAIN SYNDROME OF LEFT KNEE: ICD-10-CM

## 2023-05-16 PROCEDURE — 97110 THERAPEUTIC EXERCISES: CPT | Mod: GP

## 2023-05-16 PROCEDURE — 97161 PT EVAL LOW COMPLEX 20 MIN: CPT | Mod: GP

## 2023-05-16 ASSESSMENT — ACTIVITIES OF DAILY LIVING (ADL)
KNEEL ON THE FRONT OF YOUR KNEE: ACTIVITY IS VERY DIFFICULT
SIT WITH YOUR KNEE BENT: ACTIVITY IS MINIMALLY DIFFICULT
LIMPING: THE SYMPTOM AFFECTS MY ACTIVITY SLIGHTLY
STAND: ACTIVITY IS SOMEWHAT DIFFICULT
RAW_SCORE: 44
RISE FROM A CHAIR: ACTIVITY IS SOMEWHAT DIFFICULT
PAIN: THE SYMPTOM AFFECTS MY ACTIVITY SLIGHTLY
WEAKNESS: I HAVE THE SYMPTOM BUT IT DOES NOT AFFECT MY ACTIVITY
KNEE_ACTIVITY_OF_DAILY_LIVING_SUM: 44
HOW_WOULD_YOU_RATE_THE_CURRENT_FUNCTION_OF_YOUR_KNEE_DURING_YOUR_USUAL_DAILY_ACTIVITIES_ON_A_SCALE_FROM_0_TO_100_WITH_100_BEING_YOUR_LEVEL_OF_KNEE_FUNCTION_PRIOR_TO_YOUR_INJURY_AND_0_BEING_THE_INABILITY_TO_PERFORM_ANY_OF_YOUR_USUAL_DAILY_ACTIVITIES?: 70
GO UP STAIRS: ACTIVITY IS FAIRLY DIFFICULT
SWELLING: I DO NOT HAVE THE SYMPTOM
WALK: ACTIVITY IS SOMEWHAT DIFFICULT
GO DOWN STAIRS: ACTIVITY IS SOMEWHAT DIFFICULT
HOW_WOULD_YOU_RATE_THE_OVERALL_FUNCTION_OF_YOUR_KNEE_DURING_YOUR_USUAL_DAILY_ACTIVITIES?: ABNORMAL
STIFFNESS: I DO NOT HAVE THE SYMPTOM
GIVING WAY, BUCKLING OR SHIFTING OF KNEE: I DO NOT HAVE THE SYMPTOM
AS_A_RESULT_OF_YOUR_KNEE_INJURY,_HOW_WOULD_YOU_RATE_YOUR_CURRENT_LEVEL_OF_DAILY_ACTIVITY?: NEARLY NORMAL
KNEE_ACTIVITY_OF_DAILY_LIVING_SCORE: 62.86
SQUAT: I AM UNABLE TO DO THE ACTIVITY

## 2023-05-16 NOTE — PROGRESS NOTES
Physical Therapy Initial Evaluation  Subjective:  The history is provided by the patient.   Patient Health History  Lou Treviño being seen for L knee pain.     Problem began: 5/16/2022.   Problem occurred: insideous onset   Pain is reported as 2/10 on pain scale.  General health as reported by patient is good.  Pertinent medical history includes: overweight, mental illness, depression and asthma.   Red flags:  None as reported by patient.         Current medications:  Anti-depressants.    Current occupation is disability e.                     Therapist Generated HPI Evaluation  Problem details: Pt is referred to PT for L knee pain. No JOLYNN, has been dealing with this for about a year.     Pain is under kneecap. Pain is achey during walking, during stairs it can be sharp.     Worse: going up stairs, range of motion tests by doctor (knee hurt for 2 days after her appointment), getting off the floor    Better: rest, knee sleeve    Pt admits to not doing a lot of physical activity, pt sits at a desk all day. No pain with sitting.     .         Type of problem:  Left knee.    This is a chronic condition.  Condition occurred with:  Insidious onset.                         System    Physical Exam    General     ROS      KNEE OBJECTIVE    Posture: no differences in patellar positioning    Gait: slight limp, decreased L stance    Functional Observations:   - DL Squat: anterior knee translation, severe sharp L knee pain, decreased glute activation  - SL squat: not tested  - Lateral step down: not tested    Lower Extremity Strength & Range of Motion    Knee Joint ROM   Hyperextension Extension Flexion   Right 0 deg 0 deg 130 deg   Left 0 deg 0 deg 130 deg**             Hip Joint ROM   ER IR   Right 40 deg 15 deg   Left 45 deg 10 deg   Duane test: + B    Lower Extremity Muscle Strength (x/5)   Hip IR Hip ER Hip ABD Hip Ext  Knee Ext Knee Flex   Right  4/5 4/5 3+/5 4/5 4/5 4/5   Left 4/5 4/5 3+/5 4/5 4/5 4/5       Straight  leg raise: good quad control, pain in posterior knee as pt lifted up (pain decreased with ankle PF)    Palpation:   Tender to palpation at the following structures: mild tenderness over L patellar tendon, also to quad muscle belly on L    Special tests:    L R   Anterior Drawer     Posterior Drawer     Lachman's     Valgus 0 degrees     Valgus 30 degrees     Varus 0 degrees     Varus 30 degrees     Mary's     Appley's     Lateral Compression     Patellar Compression - -       Other: tried fat pad taping with pt, no change in sxs      ASSESSMENT/PLAN    KEY PT FINDINGS:  1) improper squat form and pain with squats  2) decreased flexibility of knee/ankle  3) decreased knee/hip strength B  4) no change in sxs with taping      Therapist Assessment: Lou Treviño is a 37 year old female patient presenting to Physical Therapy with L knee pain. Patient demonstrates improper squat form with sharp knee pain, decreased flexibility of knee/ankle, decreased knee/hip strength B, and no change in sxs with taping. Subjective history and objective findings are consistent with PFPS vs PF OA. These impairments limit this patient's ability to perform stairs and ADLs without pain. Skilled PT services are necessary in order to reduce impairments and maximize independent function.    Patient is a 37 year old female with left side knee complaints.    Patient has the following significant findings with corresponding treatment plan.                Diagnosis 1:  L knee pain  Pain -  manual therapy, self management, education and home program  Decreased ROM/flexibility - manual therapy, therapeutic exercise, therapeutic activity and home program  Decreased strength - therapeutic exercise, therapeutic activities and home program  Decreased function - therapeutic activities and home program    Cumulative Therapy Evaluation is: Low complexity.    Previous and current functional limitations:  (See Goal Flow Sheet for this information)     Short term and Long term goals: (See Goal Flow Sheet for this information)     Communication ability:  Patient appears to be able to clearly communicate and understand verbal and written communication and follow directions correctly.  Treatment Explanation - The following has been discussed with the patient:   RX ordered/plan of care  Anticipated outcomes  Possible risks and side effects  This patient would benefit from PT intervention to resume normal activities.   Rehab potential is good.    Frequency:  1 X week, once daily  Duration:  for 8 weeks  Discharge Plan:  Achieve all LTG.  Independent in home treatment program.  Reach maximal therapeutic benefit.    Please refer to the daily flowsheet for treatment today, total treatment time and time spent performing 1:1 timed codes.

## 2023-05-18 NOTE — PROGRESS NOTES
[facility]    OUTPATIENT Physical Therapy ORTHOPEDIC EVALUATION  PLAN OF TREATMENT FOR OUTPATIENT REHABILITATION  (COMPLETE FOR INITIAL CLAIMS ONLY)  Patient's Last Name, First Name, M.I.  YOB: 1985  Lou Treviño    Provider s Name:  [facility]   Medical Record No.  [id number]   Start of Care Date:   5/16/23   Onset Date:    4/26/23 (order date)   Treatment Diagnosis:  L knee pain Medical Diagnosis:     Patellofemoral pain syndrome of left knee  Left knee pain       Goals:     05/16/23 0500   Goal #1   Goal #1 squatting/kneeling   Current Functional Level Can squat with pain of level    Performance Level 8/10   STG Target Performance Reduce pain with squatting to    Performance Level 5/10   Rationale for proper body mechanics while performing housework;for proper body mechanics while performing yardwork and home maintenance;for proper body mechanics when lifting, personal hygiene, dressing   Due date 06/20/23   LTG Target Performance Reduce pain with squatting to    Performance Level 2/10   Rationale for proper body mechanics while performing housework;for proper body mechanics while performing yardwork and home maintenance;for proper body mechanics when lifting, personal hygiene, dressing   Due date 07/11/23       Therapy Frequency:  1x/week  Predicted Duration of Therapy Intervention:   8 weeks    DAKOTA MONTIEL, PT                 I CERTIFY THE NEED FOR THESE SERVICES FURNISHED UNDER        THIS PLAN OF TREATMENT AND WHILE UNDER MY CARE     (Physician attestation of this document indicates review and certification of the therapy plan).                     Certification Date From:    5/16/23  Certification Date To:   8/12/23    Referring Provider:  Mellissa Jimenez    Initial Assessment        See Epic Evaluation

## 2023-05-26 ENCOUNTER — THERAPY VISIT (OUTPATIENT)
Dept: PHYSICAL THERAPY | Facility: CLINIC | Age: 38
End: 2023-05-26
Attending: STUDENT IN AN ORGANIZED HEALTH CARE EDUCATION/TRAINING PROGRAM
Payer: COMMERCIAL

## 2023-05-26 DIAGNOSIS — M25.562 LEFT KNEE PAIN: Primary | ICD-10-CM

## 2023-05-26 PROCEDURE — 97140 MANUAL THERAPY 1/> REGIONS: CPT | Mod: GP

## 2023-06-16 ENCOUNTER — THERAPY VISIT (OUTPATIENT)
Dept: PHYSICAL THERAPY | Facility: CLINIC | Age: 38
End: 2023-06-16
Payer: COMMERCIAL

## 2023-06-16 DIAGNOSIS — M25.562 LEFT KNEE PAIN: Primary | ICD-10-CM

## 2023-06-16 PROCEDURE — 97530 THERAPEUTIC ACTIVITIES: CPT | Mod: GP

## 2023-06-16 PROCEDURE — 97140 MANUAL THERAPY 1/> REGIONS: CPT | Mod: GP

## 2023-06-16 PROCEDURE — 97110 THERAPEUTIC EXERCISES: CPT | Mod: GP

## 2023-06-30 ENCOUNTER — THERAPY VISIT (OUTPATIENT)
Dept: PHYSICAL THERAPY | Facility: CLINIC | Age: 38
End: 2023-06-30
Payer: COMMERCIAL

## 2023-06-30 DIAGNOSIS — M25.562 LEFT KNEE PAIN: Primary | ICD-10-CM

## 2023-06-30 PROCEDURE — 97530 THERAPEUTIC ACTIVITIES: CPT | Mod: GP

## 2023-06-30 PROCEDURE — 97110 THERAPEUTIC EXERCISES: CPT | Mod: GP

## 2023-07-11 ENCOUNTER — OFFICE VISIT (OUTPATIENT)
Dept: FAMILY MEDICINE | Facility: CLINIC | Age: 38
End: 2023-07-11
Payer: COMMERCIAL

## 2023-07-11 VITALS
BODY MASS INDEX: 40.77 KG/M2 | HEART RATE: 81 BPM | WEIGHT: 269 LBS | TEMPERATURE: 99.1 F | DIASTOLIC BLOOD PRESSURE: 88 MMHG | HEIGHT: 68 IN | SYSTOLIC BLOOD PRESSURE: 133 MMHG | OXYGEN SATURATION: 98 %

## 2023-07-11 DIAGNOSIS — F41.9 ANXIETY AND DEPRESSION: ICD-10-CM

## 2023-07-11 DIAGNOSIS — Z87.898 HISTORY OF PREDIABETES: ICD-10-CM

## 2023-07-11 DIAGNOSIS — R19.5 LOOSE STOOLS: ICD-10-CM

## 2023-07-11 DIAGNOSIS — R15.2 FECAL URGENCY: ICD-10-CM

## 2023-07-11 DIAGNOSIS — F32.A ANXIETY AND DEPRESSION: ICD-10-CM

## 2023-07-11 DIAGNOSIS — R10.84 ABDOMINAL PAIN, GENERALIZED: Primary | ICD-10-CM

## 2023-07-11 PROCEDURE — 99215 OFFICE O/P EST HI 40 MIN: CPT | Performed by: FAMILY MEDICINE

## 2023-07-11 PROCEDURE — 99417 PROLNG OP E/M EACH 15 MIN: CPT | Performed by: FAMILY MEDICINE

## 2023-07-11 ASSESSMENT — PATIENT HEALTH QUESTIONNAIRE - PHQ9: SUM OF ALL RESPONSES TO PHQ QUESTIONS 1-9: 10

## 2023-07-11 NOTE — PATIENT INSTRUCTIONS
- Let's check some labs this week. Make a lab-only appointment for some blood tests to check what might be causing your worsening diarrhea    - Stool testing will also be important but they will want to do this at your gastroenterologist visit     - I put in an order for a consultation with a gastroenterologist. You should be hearing back in the next week to set this up.    - Continue eating small bland meals and drinking plenty of fluids to stay hydrated

## 2023-07-11 NOTE — PROGRESS NOTES
"  Assessment & Plan     Abdominal pain, generalized  Loose stools  Fecal urgency  Several month history of gradually worsening loose stool frequency, fecal urgency with some incontinence, and abdominal pain that is not fully alleviated by stooling. Presentation complicated by known short gut in the setting of previous intussusception and bowel resection, though not clear how much bowel was removed. Some associated fatigue and sweating, so could represent hyperthyroidism. No frequent use of sugar substitutes. Differential also includes infection, IBD, lactose intolerance, celiac disease. Will begin work-up as below and consult GI early given alarm features and surgical history. Prefers non-stool-based testing if GI consult is likely to get these soon.  - TSH with free T4 reflex; Future  - Comprehensive metabolic panel; Future  - CBC with differential; Future  - Erythrocyte sedimentation rate auto; Future  - C-Reactive Protein; Future  - Celiac studies; Future  - Adult GI  Referral - Consult Only; Future  - If initial work-up is inconclusive and GI consultation remote, will pursue additional testing including stool testing.  - BRAT diet reviewed    History of prediabetes  A1c of 6.0 in May 2022. Due for routine repeat screening. Struggling with lifestyle changes due to dietary restrictions/intolerances.  - Hemoglobin A1c; Future    Anxiety and depression  PHQ-9 of 10 today. Worsening symptoms in the context of physical ailments today and disrupted sleep. Taking citalopram consistently.   - Continue citalopram at current dose for now  - Close follow up     55 minutes spent by me on the date of the encounter doing chart review, patient visit and documentation        BMI:   Estimated body mass index is 40.9 kg/m  as calculated from the following:    Height as of this encounter: 1.727 m (5' 8\").    Weight as of this encounter: 122 kg (269 lb).   Weight management plan: Not addressed today given " symptoms    Depression Screening Follow Up        7/11/2023     4:50 PM   PHQ   PHQ-9 Total Score 10   Q9: Thoughts of better off dead/self-harm past 2 weeks Not at all       Follow Up Actions Taken  Patient counseled, no additional follow up at this time.     Follow up pending lab results and symptoms    Silvia Groves MD  [facility]    Juliet Eugene is a 37 year old, presenting for the following health issues:  Other (Stomach problems. Pt has IBS. /Increased urgency and disturbing sleep. The urgency cause discomfort and pain on stomach. )        7/11/2023     4:01 PM   Additional Questions   Roomed by Rolan   Accompanied by Self     HPI     For the past week, she has been going to the bathroom 3-4 times more often than she has previously. Gradual worsening over the past few months. She has always had urgency because of her short gut but it has gotten more severe. She is needing to pass stool 1-2 times per night and has some incontinence with this. This is typically severe cramping in the middle of the abdomen that occasionally also involves some lower abdominal pain. She has chronically loose stools but it is looser. It feels like acid is coming out of her when she is passing stool but then no longer has pain in between stooling. Some blood occasionally when she wipes. No blood in the stool itself. All foods seem to be triggering aside from crackers and cheese. Still taking Pepcid without relief. Also has tried Tums with minimal relief aside from reducing the churning feeling. Tried fiber gummies without benefit.     No vomiting but some mild nausea when she thinks about eating - like a food aversion. No fevers. No known exposures - no new pets, hobbies, travel, food choices. No recent medication changes. No one else has had similar issues. No regular fake sugar intake - no diet sodas or other sugar substitutes. She is down 5 lb from April 2023 due to intolerance of eating.  "    She has been sweating more when she sleeps at night and has some receding hair line.     No recent new stressors. 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 Is enjoying this.    History of intussusception s/p bowel resection and short gut syndrome. Also has been told that she might have IBS, but she's not sure if this is accurate. No FH of any GI abnormalities or thyroid disorders.        Objective    /88   Pulse 81   Temp 99.1  F (37.3  C) (Tympanic)   Ht 1.727 m (5' 8\")   Wt 122 kg (269 lb)   SpO2 98%   BMI 40.90 kg/m    Body mass index is 40.9 kg/m .  Physical Exam   GENERAL: alert and no distress  EYES: eyes grossly normal to inspection  RESP: normal rate and effort  ABDOMEN: soft, mild discomfort to palpation in the central abdomen, no hepatosplenomegaly  SKIN: no suspicious lesions or rashes  PSYCH: affect congruent with mood, generally upbeat         5/27/2022     4:16 PM 4/26/2023     4:18 PM 7/11/2023     4:50 PM   PHQ   PHQ-9 Total Score 9 10 10   Q9: Thoughts of better off dead/self-harm past 2 weeks Not at all Not at all Not at all       "

## 2023-07-14 ENCOUNTER — LAB (OUTPATIENT)
Dept: LAB | Facility: CLINIC | Age: 38
End: 2023-07-14
Payer: COMMERCIAL

## 2023-07-14 DIAGNOSIS — R19.5 LOOSE STOOLS: ICD-10-CM

## 2023-07-14 DIAGNOSIS — R15.2 FECAL URGENCY: ICD-10-CM

## 2023-07-14 DIAGNOSIS — Z87.898 HISTORY OF PREDIABETES: ICD-10-CM

## 2023-07-14 DIAGNOSIS — R10.84 ABDOMINAL PAIN, GENERALIZED: ICD-10-CM

## 2023-07-14 LAB
ALBUMIN SERPL BCG-MCNC: 3.5 G/DL (ref 3.5–5.2)
ALP SERPL-CCNC: 76 U/L (ref 35–104)
ALT SERPL W P-5'-P-CCNC: 33 U/L (ref 0–50)
ANION GAP SERPL CALCULATED.3IONS-SCNC: 12 MMOL/L (ref 7–15)
AST SERPL W P-5'-P-CCNC: 29 U/L (ref 0–45)
BASOPHILS # BLD AUTO: 0.1 10E3/UL (ref 0–0.2)
BASOPHILS NFR BLD AUTO: 0 %
BILIRUB SERPL-MCNC: <0.2 MG/DL
BUN SERPL-MCNC: 6.6 MG/DL (ref 6–20)
CALCIUM SERPL-MCNC: 9 MG/DL (ref 8.6–10)
CHLORIDE SERPL-SCNC: 103 MMOL/L (ref 98–107)
CREAT SERPL-MCNC: 0.68 MG/DL (ref 0.51–0.95)
CRP SERPL-MCNC: 44.7 MG/L
DEPRECATED HCO3 PLAS-SCNC: 23 MMOL/L (ref 22–29)
EOSINOPHIL # BLD AUTO: 0.4 10E3/UL (ref 0–0.7)
EOSINOPHIL NFR BLD AUTO: 3 %
ERYTHROCYTE [DISTWIDTH] IN BLOOD BY AUTOMATED COUNT: 12.3 % (ref 10–15)
ERYTHROCYTE [SEDIMENTATION RATE] IN BLOOD BY WESTERGREN METHOD: 48 MM/HR (ref 0–20)
GFR SERPL CREATININE-BSD FRML MDRD: >90 ML/MIN/1.73M2
GLUCOSE SERPL-MCNC: 115 MG/DL (ref 70–99)
HBA1C MFR BLD: 6.5 % (ref 0–5.6)
HCT VFR BLD AUTO: 37.6 % (ref 35–47)
HGB BLD-MCNC: 11.9 G/DL (ref 11.7–15.7)
IMM GRANULOCYTES # BLD: 0 10E3/UL
IMM GRANULOCYTES NFR BLD: 0 %
LYMPHOCYTES # BLD AUTO: 2.1 10E3/UL (ref 0.8–5.3)
LYMPHOCYTES NFR BLD AUTO: 17 %
MCH RBC QN AUTO: 26.7 PG (ref 26.5–33)
MCHC RBC AUTO-ENTMCNC: 31.6 G/DL (ref 31.5–36.5)
MCV RBC AUTO: 84 FL (ref 78–100)
MONOCYTES # BLD AUTO: 0.8 10E3/UL (ref 0–1.3)
MONOCYTES NFR BLD AUTO: 6 %
NEUTROPHILS # BLD AUTO: 9.2 10E3/UL (ref 1.6–8.3)
NEUTROPHILS NFR BLD AUTO: 73 %
PLATELET # BLD AUTO: 345 10E3/UL (ref 150–450)
POTASSIUM SERPL-SCNC: 4 MMOL/L (ref 3.4–5.3)
PROT SERPL-MCNC: 7.8 G/DL (ref 6.4–8.3)
RBC # BLD AUTO: 4.46 10E6/UL (ref 3.8–5.2)
SODIUM SERPL-SCNC: 138 MMOL/L (ref 136–145)
TSH SERPL DL<=0.005 MIU/L-ACNC: 2.76 UIU/ML (ref 0.3–4.2)
WBC # BLD AUTO: 12.5 10E3/UL (ref 4–11)

## 2023-07-14 PROCEDURE — 84443 ASSAY THYROID STIM HORMONE: CPT

## 2023-07-14 PROCEDURE — 85652 RBC SED RATE AUTOMATED: CPT

## 2023-07-14 PROCEDURE — 82784 ASSAY IGA/IGD/IGG/IGM EACH: CPT

## 2023-07-14 PROCEDURE — 83036 HEMOGLOBIN GLYCOSYLATED A1C: CPT

## 2023-07-14 PROCEDURE — 86140 C-REACTIVE PROTEIN: CPT

## 2023-07-14 PROCEDURE — 36415 COLL VENOUS BLD VENIPUNCTURE: CPT

## 2023-07-14 PROCEDURE — 86364 TISS TRNSGLTMNASE EA IG CLAS: CPT

## 2023-07-14 PROCEDURE — 80053 COMPREHEN METABOLIC PANEL: CPT

## 2023-07-14 PROCEDURE — 85025 COMPLETE CBC W/AUTO DIFF WBC: CPT

## 2023-07-16 LAB
TTG IGA SER-ACNC: 0.7 U/ML
TTG IGG SER-ACNC: 1.1 U/ML

## 2023-07-17 ENCOUNTER — LAB (OUTPATIENT)
Dept: LAB | Facility: CLINIC | Age: 38
End: 2023-07-17
Payer: COMMERCIAL

## 2023-07-17 ENCOUNTER — MYC MEDICAL ADVICE (OUTPATIENT)
Dept: FAMILY MEDICINE | Facility: CLINIC | Age: 38
End: 2023-07-17

## 2023-07-17 DIAGNOSIS — R19.5 LOOSE STOOLS: ICD-10-CM

## 2023-07-17 DIAGNOSIS — R10.84 ABDOMINAL PAIN, GENERALIZED: ICD-10-CM

## 2023-07-17 DIAGNOSIS — R73.09 ELEVATED HEMOGLOBIN A1C: ICD-10-CM

## 2023-07-17 DIAGNOSIS — R15.2 FECAL URGENCY: ICD-10-CM

## 2023-07-17 DIAGNOSIS — R19.5 LOOSE STOOLS: Primary | ICD-10-CM

## 2023-07-17 LAB
HBA1C MFR BLD: 6.3 % (ref 0–5.6)
IGA SERPL-MCNC: 303 MG/DL (ref 84–499)

## 2023-07-17 PROCEDURE — 36415 COLL VENOUS BLD VENIPUNCTURE: CPT

## 2023-07-17 PROCEDURE — 83036 HEMOGLOBIN GLYCOSYLATED A1C: CPT

## 2023-07-17 PROCEDURE — 87328 CRYPTOSPORIDIUM AG IA: CPT

## 2023-07-17 PROCEDURE — 87329 GIARDIA AG IA: CPT

## 2023-07-17 NOTE — TELEPHONE ENCOUNTER
REFERRAL INFORMATION:    Referring Provider: Silvia Groves MD    Referring Clinic: [facility]    Reason for Visit/Diagnosis: Abdominal pain, loose stools, and fecal urgency     FUTURE VISIT INFORMATION:    Appointment Date: 7/26/23    Appointment Time: 9:20 AM     NOTES STATUS DETAILS   OFFICE NOTE from Referring Provider [facility]:  7/11/23; 6/10/22; 5/27/22- OV with Silvia Groves MD   OFFICE NOTE from Other Specialist [facility]- GI:  7/25/22- Virtual visit with Maria E Boss RD   HOSPITAL DISCHARGE SUMMARY/  ED VISITS N/A    OPERATIVE REPORT N/A    MEDICATION LIST Internal         ENDOSCOPY  N/A    COLONOSCOPY N/A    ERCP N/A    EUS N/A    STOOL TESTING [facility]:  7/17/23- Stool culture for speciation  7/17/23- Occult blood stool test  7/17/23- Cryptosporidium/ Giardia immunoassay  7/17/23- Calprotectin fecal   7/17/23- Ova and parasites stool test   PERTINENT LABS Internal    PATHOLOGY REPORTS (RELATED) N/A    IMAGING (CT, MRI, EGD, MRCP, Small Bowel Follow Through/SBT, MR/CT Enterography) [facility]:  6/9/22- US Abd

## 2023-07-18 ENCOUNTER — TELEPHONE (OUTPATIENT)
Dept: LAB | Facility: CLINIC | Age: 38
End: 2023-07-18
Payer: COMMERCIAL

## 2023-07-18 LAB
C PARVUM AG STL QL IA: NEGATIVE
G LAMBLIA AG STL QL IA: NEGATIVE

## 2023-07-18 NOTE — PROGRESS NOTES
Called Lou Treviño and was unable to leave a message to inform the pt to call and schedule a lab only appt for stool collection.

## 2023-07-19 ENCOUNTER — MYC MEDICAL ADVICE (OUTPATIENT)
Dept: FAMILY MEDICINE | Facility: CLINIC | Age: 38
End: 2023-07-19
Payer: COMMERCIAL

## 2023-07-19 LAB — HEMOCCULT STL QL: POSITIVE

## 2023-07-19 PROCEDURE — 87177 OVA AND PARASITES SMEARS: CPT | Performed by: FAMILY MEDICINE

## 2023-07-19 PROCEDURE — 87507 IADNA-DNA/RNA PROBE TQ 12-25: CPT

## 2023-07-19 PROCEDURE — 83993 ASSAY FOR CALPROTECTIN FECAL: CPT | Performed by: FAMILY MEDICINE

## 2023-07-19 PROCEDURE — 87046 STOOL CULTR AEROBIC BACT EA: CPT

## 2023-07-19 PROCEDURE — 82272 OCCULT BLD FECES 1-3 TESTS: CPT

## 2023-07-19 PROCEDURE — 87209 SMEAR COMPLEX STAIN: CPT | Performed by: FAMILY MEDICINE

## 2023-07-20 LAB
ADV 40+41 DNA STL QL NAA+NON-PROBE: NEGATIVE
ASTRO TYP 1-8 RNA STL QL NAA+NON-PROBE: NEGATIVE
C CAYETANENSIS DNA STL QL NAA+NON-PROBE: NEGATIVE
CAMPYLOBACTER DNA SPEC NAA+PROBE: NEGATIVE
CRYPTOSP DNA STL QL NAA+NON-PROBE: NEGATIVE
E COLI O157 DNA STL QL NAA+NON-PROBE: NORMAL
E HISTOLYT DNA STL QL NAA+NON-PROBE: NEGATIVE
EAEC ASTA GENE ISLT QL NAA+PROBE: NEGATIVE
EC STX1+STX2 GENES STL QL NAA+NON-PROBE: NEGATIVE
EPEC EAE GENE STL QL NAA+NON-PROBE: NEGATIVE
ETEC LTA+ST1A+ST1B TOX ST NAA+NON-PROBE: NEGATIVE
G LAMBLIA DNA STL QL NAA+NON-PROBE: NEGATIVE
NOROVIRUS GI+II RNA STL QL NAA+NON-PROBE: NEGATIVE
O+P STL MICRO: NEGATIVE
P SHIGELLOIDES DNA STL QL NAA+NON-PROBE: NEGATIVE
RVA RNA STL QL NAA+NON-PROBE: NEGATIVE
SALMONELLA SP RPOD STL QL NAA+PROBE: NEGATIVE
SAPO I+II+IV+V RNA STL QL NAA+NON-PROBE: NEGATIVE
SHIGELLA SP+EIEC IPAH ST NAA+NON-PROBE: NEGATIVE
TRI STN SPEC: NORMAL
V CHOLERAE DNA SPEC QL NAA+PROBE: NEGATIVE
VIBRIO DNA SPEC NAA+PROBE: NEGATIVE
Y ENTEROCOL DNA STL QL NAA+PROBE: NEGATIVE

## 2023-07-21 ENCOUNTER — TELEPHONE (OUTPATIENT)
Dept: FAMILY MEDICINE | Facility: CLINIC | Age: 38
End: 2023-07-21
Payer: COMMERCIAL

## 2023-07-21 DIAGNOSIS — R19.5 ELEVATED FECAL CALPROTECTIN: ICD-10-CM

## 2023-07-21 LAB — CALPROTECTIN STL-MCNT: 340 MG/KG (ref 0–49.9)

## 2023-07-21 NOTE — TELEPHONE ENCOUNTER
I called Valorie and discussed her lab test results as follows:    We tested for many specific viral, bacterial, and parasitic infections and all of these tests were negative/normal. The stool testing did show blood and a marker of inflammation (fecal calprotectin). This is a strong indicator that there is active inflammation in the bowels as was indicated by the blood tests.     We discussed that this might be from inflammatory bowel disease or other causes of inflammation. We also discussed that the GI specialist will likely recommend a colonoscopy to take a biopsy to get a better sense of what is going on.     We discussed that there aren't any good treatment options until we have the diagnosis and that some of the treatments might interfere with the diagnosis.    Valorie understands the next steps and is awaiting her GI appointment. She continues to have similar symptoms as she did at our earlier visit in July. Will follow up with her after the GI appointment.     Silvia Groves MD

## 2023-07-26 ENCOUNTER — PRE VISIT (OUTPATIENT)
Dept: GASTROENTEROLOGY | Facility: CLINIC | Age: 38
End: 2023-07-26

## 2023-07-26 ENCOUNTER — VIRTUAL VISIT (OUTPATIENT)
Dept: GASTROENTEROLOGY | Facility: CLINIC | Age: 38
End: 2023-07-26
Attending: FAMILY MEDICINE
Payer: COMMERCIAL

## 2023-07-26 VITALS — WEIGHT: 268 LBS | BODY MASS INDEX: 40.75 KG/M2

## 2023-07-26 DIAGNOSIS — R10.84 ABDOMINAL PAIN, GENERALIZED: ICD-10-CM

## 2023-07-26 DIAGNOSIS — R19.5 ELEVATED FECAL CALPROTECTIN: ICD-10-CM

## 2023-07-26 DIAGNOSIS — R79.82 ELEVATED C-REACTIVE PROTEIN (CRP): ICD-10-CM

## 2023-07-26 DIAGNOSIS — R19.5 LOOSE STOOLS: ICD-10-CM

## 2023-07-26 DIAGNOSIS — R19.5 POSITIVE FECAL OCCULT BLOOD TEST: Primary | ICD-10-CM

## 2023-07-26 DIAGNOSIS — R15.2 FECAL URGENCY: ICD-10-CM

## 2023-07-26 DIAGNOSIS — R19.7 DIARRHEA, UNSPECIFIED TYPE: ICD-10-CM

## 2023-07-26 PROCEDURE — 99442 PR PHYSICIAN TELEPHONE EVALUATION 11-20 MIN: CPT | Mod: 93 | Performed by: INTERNAL MEDICINE

## 2023-07-26 ASSESSMENT — PAIN SCALES - GENERAL: PAINLEVEL: MILD PAIN (3)

## 2023-07-26 NOTE — LETTER
7/26/2023         RE: Lou Treviño  [address]  [place] [address]        Dear Colleague,    Thank you for referring your patient, Lou Treviño, to the [facility] [place]. Please see a copy of my visit note below.    Lou Treviño is a 37 year old female who is being evaluated via a billable telephone appt.    GASTROENTEROLOGY Telephone appt    CC/REFERRING MD:    Silvia Groves    HISTORY OF PRESENT ILLNESS:    Lou Treviño is a 37 year old female who is being evaluated via a billable telephone visit.  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  Current Outpatient Medications   Medication Sig Dispense Refill    cetirizine (ZYRTEC) 10 MG tablet Take 1 tablet (10 mg) by mouth daily 90 tablet 1    citalopram (CELEXA) 20 MG tablet Take 1 tablet (20 mg) by mouth daily 90 tablet 1    famotidine (PEPCID) 20 MG tablet Take 1 tablet (20 mg) by mouth 2 times daily 180 tablet 0    fluticasone (FLONASE) 50 MCG/ACT nasal spray Spray 1 spray into both nostrils daily 18.2 mL 0    ibuprofen (ADVIL/MOTRIN) 200 MG tablet Take 200-400 mg by mouth every 6 hours as needed      levalbuterol (XOPENEX HFA) 45 MCG/ACT inhaler Inhale 1-2 puffs into the lungs every 6 hours as needed for wheezing 15 g 4    norethindrone-ethinyl estradiol (ORTHO-NOVUM) 1-35 MG-MCG tablet Take 1 tablet by mouth daily 84 tablet 3    propranolol ER (INDERAL LA) 60 MG 24 hr capsule Take 1 capsule (60 mg) by mouth daily 90 capsule 3    rimegepant (NURTEC) 75 MG ODT tablet Take 1 tablet (75 mg) by mouth every 48 hours 45 tablet 1     No new  medications      ALLERGIES  Codeine    PERTINENT STUDIES have been reviewed.  7/2023: calprotectin elevated, stool tests neg for infection    ASSESSMENT/PLAN:  Lou Treviño is a 37 year old female who presents for phone call regarding 3 weeks of loose stools with urgency and some blood ; stool testing showing elevated calprotectin.  Ddx includes c diff (not yet tested) vs IBD vs less likely micro colitis vs other.  c diff stool test  Colonoscopy / biopsies  Review reports on surgery patient had as an infant - how much SB/colon removed  Phone call duration: 14 minutes    RTC 1 mo    Thank you for this consultation.  It was a pleasure to participate in the care of this patient; please contact us with any further questions.        Again, thank you for allowing me to participate in the care of your patient.      Sincerely,    Zoraida Jamil MD

## 2023-07-26 NOTE — NURSING NOTE
Is the patient currently in the state of [place]? YES    Visit mode:VIDEO    If the visit is dropped, the patient can be reconnected by: VIDEO VISIT: Text to cell phone: [phone]    Will anyone else be joining the visit? NO      How would you like to obtain your AVS? MyChart    Are changes needed to the allergy or medication list? NO    Reason for visit: Video Visit (Consult)

## 2023-07-26 NOTE — PROGRESS NOTES
Lou Treviño is a 37 year old female who is being evaluated via a billable telephone appt.    GASTROENTEROLOGY Telephone appt    CC/REFERRING MD:    Silvia Groves    HISTORY OF PRESENT ILLNESS:    Lou Treviño is a 37 year old female who is being evaluated via a billable telephone visit.  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  Current Outpatient Medications   Medication Sig Dispense Refill     cetirizine (ZYRTEC) 10 MG tablet Take 1 tablet (10 mg) by mouth daily 90 tablet 1     citalopram (CELEXA) 20 MG tablet Take 1 tablet (20 mg) by mouth daily 90 tablet 1     famotidine (PEPCID) 20 MG tablet Take 1 tablet (20 mg) by mouth 2 times daily 180 tablet 0     fluticasone (FLONASE) 50 MCG/ACT nasal spray Spray 1 spray into both nostrils daily 18.2 mL 0     ibuprofen (ADVIL/MOTRIN) 200 MG tablet Take 200-400 mg by mouth every 6 hours as needed       levalbuterol (XOPENEX HFA) 45 MCG/ACT inhaler Inhale 1-2 puffs into the lungs every 6 hours as needed for wheezing 15 g 4     norethindrone-ethinyl estradiol (ORTHO-NOVUM) 1-35 MG-MCG tablet Take 1 tablet by mouth daily 84 tablet 3     propranolol ER (INDERAL LA) 60 MG 24 hr capsule Take 1 capsule (60 mg) by mouth daily 90 capsule 3     rimegepant (NURTEC) 75 MG ODT tablet Take 1 tablet (75 mg) by mouth every 48 hours 45 tablet 1     No new medications      ALLERGIES  Codeine    PERTINENT STUDIES have been reviewed.  7/2023: calprotectin elevated, stool tests neg for infection    ASSESSMENT/PLAN:  Lou Treviño is a 37 year old female with hx of GI surgery as infant who presents for phone call  regarding 3 weeks of loose stools with urgency and some blood ; stool testing showing elevated calprotectin.  Ddx includes c diff (not yet tested) vs IBD vs less likely micro colitis vs other.  c diff stool test  Colonoscopy / biopsies  Review reports on surgery patient had as an infant - how much SB/colon removed  Phone call duration: 14 minutes    RTC 1 mo    Thank you for this consultation.  It was a pleasure to participate in the care of this patient; please contact us with any further questions.      Addendum: notes obtained and reviewed regarding surgery in infancy:  Dec 11, 1985 - patient 3 months old - presented with 48h abdo pain/nausea/vomiting - barium enema showed intussusception and failed to reduce it - taken to OR finding of intussusception of ileum into colon terminating in mid transverse colon and contained intussusception was necrotic - surgery was right hemicolectomy with ileotransverse colostomy.

## 2023-07-27 ENCOUNTER — MYC REFILL (OUTPATIENT)
Dept: FAMILY MEDICINE | Facility: CLINIC | Age: 38
End: 2023-07-27
Payer: COMMERCIAL

## 2023-07-27 ENCOUNTER — TELEPHONE (OUTPATIENT)
Dept: GASTROENTEROLOGY | Facility: CLINIC | Age: 38
End: 2023-07-27
Payer: COMMERCIAL

## 2023-07-27 DIAGNOSIS — R10.84 GENERALIZED POSTPRANDIAL ABDOMINAL PAIN: ICD-10-CM

## 2023-07-27 DIAGNOSIS — R19.5 LOOSE STOOLS: Primary | ICD-10-CM

## 2023-07-27 DIAGNOSIS — R11.0 NAUSEA: ICD-10-CM

## 2023-07-27 RX ORDER — BISACODYL 5 MG/1
TABLET, DELAYED RELEASE ORAL
Qty: 4 TABLET | Refills: 0 | Status: SHIPPED | OUTPATIENT
Start: 2023-07-27 | End: 2023-09-15

## 2023-07-27 NOTE — TELEPHONE ENCOUNTER
Pre assessment completed for upcoming procedure.      Procedure details:    Patient scheduled for Colonoscopy  on 8/3/23.     Arrival time: 1030. Procedure time 1130    Pre op exam needed? N/A    Facility location: [facility]; [address] [place] [address], [address] Floor, [place] [address]    Sedation type: MAC    Indication for procedure: +FOBT, loose stools, fecal urgency    COVID policy reviewed.    Designated  policy reviewed. Instructed to have someone stay 24 hours post procedure.       Chart review:     Electronic implanted devices? No    Diabetic? Prediabetic    Diabetic medication HOLDING recommendations: (if applicable)  Oral diabetic medications: No  Diabetic injectables: No  Insulin: No      Medication review:    Anticoagulants? No    NSAIDS? Yes.  Ibuprofen (Advil, Motrin).  Holding interval of 1 day before procedure.    Other medication HOLDING recommendations:  N/A      Prep for procedure:     Bowel prep recommendation: Extended prep Golytely    Due to: BMI > 40.     Prep instructions sent via [employer] Bowel prep script sent to [id number] [place], [place] - [address]    Reviewed procedure prep instructions.     Patient verbalized understanding and had no questions or concerns at this time.        Joleen Jackson RN  Endoscopy Procedure Pre Assessment RN  [phone] option 4

## 2023-07-27 NOTE — TELEPHONE ENCOUNTER
"Endoscopy Scheduling Screen    Have you had a positive Covid test in the last 14 days?  No    Are you active on MyChart?   Yes    What insurance is in the chart?  Other:      Ordering/Referring Provider: AR FREED   (If ordering provider performs procedure, schedule with ordering provider unless otherwise instructed. )    BMI: Estimated body mass index is 40.75 kg/m  as calculated from the following:    Height as of 7/11/23: 1.727 m (5' 8\").    Weight as of 7/26/23: 121.6 kg (268 lb).     Sedation Ordered  MAC/deep sedation.   BMI<= 45 45 < BMI <= 48 48 < BMI < = 50  BMI > 50   No Restrictions No [facility]  No [facility]  [facility] with exceptions Hospital Only OR Only       Do you have a history of malignant hyperthermia or adverse reaction to anesthesia?  No    (Females) Are you currently pregnant?   No     Have you been diagnosed or told you have pulmonary hypertension?   No    Do you have an LVAD?  No    Have you been told you have moderate to severe sleep apnea?  No    Have you been told you have COPD, asthma, or any other lung disease?  Yes     What breathing problems do you have?  Asthma     Do you use home oxygen?  No    Have your breathing problems required an ED visit or hospitalization in the last year?  No    Do you have any heart conditions?  No     Have you ever had or are you awaiting a heart or lung transplant?   No    Have you had a stroke or transient ischemic attack (TIA aka \"mini stroke\" in the last 6 months?   No    Have you been diagnosed with or been told you have cirrhosis of the liver?   No    Are you currently on dialysis?   No    Do you need assistance transferring?   No    BMI: Estimated body mass index is 40.75 kg/m  as calculated from the following:    Height as of 7/11/23: 1.727 m (5' 8\").    Weight as of 7/26/23: 121.6 kg (268 lb).     Is patients BMI > 40 and scheduling location [facility]?  No    Do you take the medication Phentermine, Ozempic or Wegovy?  No    Do you take the medication " Naltrexone?  No    Do you take blood thinners?  No      Prep   Are you currently on dialysis or do you have chronic kidney disease?  No    Do you have a diagnosis of diabetes?  No - pre-diabetic    Do you have a diagnosis of cystic fibrosis (CF)?  No    On a regular basis do you go 3 -5 days between bowel movements?  No    BMI > 40?  Yes (Extended Prep)    Preferred Pharmacy:    [facility] # [id number] - [place], [place] - [address]  [address]  [facility] [address]  Phone: [phone] Fax: [phone]      Final Scheduling Details   Colonoscopy prep sent?  Golytely Extended Prep    Procedure scheduled  Colonoscopy    Surgeon:  MARGOTH Jamil     Date of procedure:  8/3/2023     Schedule PAC:   No    Location  [facility] - ASC    Sedation   MAC/Deep Sedation    Patient Reminders:   You will receive a call from a Nurse to review instructions and health history.  This assessment must be completed prior to your procedure.  Failure to complete the Nurse assessment may result in the procedure being cancelled.      On the day of your procedure, please designate an adult(s) who can drive you home stay with you for the next 24 hours. The medicines used in the exam will make you sleepy. You will not be able to drive.      You cannot take public transportation, ride share services, or non-medical taxi service without a responsible caregiver.  Medical transport services are allowed with the requirement that a responsible caregiver will receive you at your destination.  We require that drivers and caregivers are confirmed prior to your procedure.

## 2023-07-28 RX ORDER — FAMOTIDINE 20 MG/1
20 TABLET, FILM COATED ORAL 2 TIMES DAILY
Qty: 180 TABLET | Refills: 0 | Status: SHIPPED | OUTPATIENT
Start: 2023-07-28 | End: 2023-11-21

## 2023-07-31 ENCOUNTER — LAB (OUTPATIENT)
Dept: LAB | Facility: CLINIC | Age: 38
End: 2023-07-31
Payer: COMMERCIAL

## 2023-07-31 DIAGNOSIS — R19.5 POSITIVE FECAL OCCULT BLOOD TEST: ICD-10-CM

## 2023-07-31 DIAGNOSIS — R19.5 LOOSE STOOLS: ICD-10-CM

## 2023-07-31 DIAGNOSIS — R15.2 FECAL URGENCY: ICD-10-CM

## 2023-07-31 DIAGNOSIS — R19.5 ELEVATED FECAL CALPROTECTIN: ICD-10-CM

## 2023-07-31 DIAGNOSIS — R19.7 DIARRHEA, UNSPECIFIED TYPE: ICD-10-CM

## 2023-07-31 DIAGNOSIS — R79.82 ELEVATED C-REACTIVE PROTEIN (CRP): ICD-10-CM

## 2023-07-31 DIAGNOSIS — R10.84 ABDOMINAL PAIN, GENERALIZED: ICD-10-CM

## 2023-07-31 PROCEDURE — 87493 C DIFF AMPLIFIED PROBE: CPT

## 2023-08-01 LAB — C DIFF TOX B STL QL: NEGATIVE

## 2023-08-02 ENCOUNTER — ANESTHESIA EVENT (OUTPATIENT)
Dept: SURGERY | Facility: AMBULATORY SURGERY CENTER | Age: 38
End: 2023-08-02
Payer: COMMERCIAL

## 2023-08-03 ENCOUNTER — ANESTHESIA (OUTPATIENT)
Dept: SURGERY | Facility: AMBULATORY SURGERY CENTER | Age: 38
End: 2023-08-03
Payer: COMMERCIAL

## 2023-08-03 ENCOUNTER — HOSPITAL ENCOUNTER (OUTPATIENT)
Facility: AMBULATORY SURGERY CENTER | Age: 38
Discharge: HOME | End: 2023-08-03
Attending: INTERNAL MEDICINE
Payer: COMMERCIAL

## 2023-08-03 VITALS
TEMPERATURE: 98.2 F | HEART RATE: 76 BPM | OXYGEN SATURATION: 100 % | DIASTOLIC BLOOD PRESSURE: 81 MMHG | RESPIRATION RATE: 14 BRPM | SYSTOLIC BLOOD PRESSURE: 118 MMHG

## 2023-08-03 VITALS — HEART RATE: 68 BPM

## 2023-08-03 LAB
COLONOSCOPY: NORMAL
CRP SERPL-MCNC: 46.2 MG/L
ERYTHROCYTE [SEDIMENTATION RATE] IN BLOOD BY WESTERGREN METHOD: 37 MM/HR (ref 0–20)
HCG UR QL: NEGATIVE
INTERNAL QC OK POCT: NORMAL
POCT KIT EXPIRATION DATE: NORMAL
POCT KIT LOT NUMBER: 1234
VIT B12 SERPL-MCNC: 589 PG/ML (ref 232–1245)

## 2023-08-03 PROCEDURE — 88305 TISSUE EXAM BY PATHOLOGIST: CPT | Mod: TC | Performed by: INTERNAL MEDICINE

## 2023-08-03 PROCEDURE — 88305 TISSUE EXAM BY PATHOLOGIST: CPT | Mod: 26 | Performed by: PATHOLOGY

## 2023-08-03 PROCEDURE — 45380 COLONOSCOPY AND BIOPSY: CPT | Performed by: INTERNAL MEDICINE

## 2023-08-03 PROCEDURE — 82607 VITAMIN B-12: CPT | Performed by: NURSE ANESTHETIST, CERTIFIED REGISTERED

## 2023-08-03 PROCEDURE — 86140 C-REACTIVE PROTEIN: CPT | Performed by: PATHOLOGY

## 2023-08-03 PROCEDURE — 85652 RBC SED RATE AUTOMATED: CPT | Performed by: PATHOLOGY

## 2023-08-03 PROCEDURE — 99000 SPECIMEN HANDLING OFFICE-LAB: CPT | Performed by: PATHOLOGY

## 2023-08-03 PROCEDURE — 81025 URINE PREGNANCY TEST: CPT | Performed by: PATHOLOGY

## 2023-08-03 RX ORDER — FLUMAZENIL 0.1 MG/ML
0.2 INJECTION, SOLUTION INTRAVENOUS
Status: DISCONTINUED | OUTPATIENT
Start: 2023-08-03 | End: 2023-08-04 | Stop reason: HOSPADM

## 2023-08-03 RX ORDER — LIDOCAINE 40 MG/G
CREAM TOPICAL
Status: DISCONTINUED | OUTPATIENT
Start: 2023-08-03 | End: 2023-08-03 | Stop reason: HOSPADM

## 2023-08-03 RX ORDER — NALOXONE HYDROCHLORIDE 0.4 MG/ML
0.2 INJECTION, SOLUTION INTRAMUSCULAR; INTRAVENOUS; SUBCUTANEOUS
Status: DISCONTINUED | OUTPATIENT
Start: 2023-08-03 | End: 2023-08-04 | Stop reason: HOSPADM

## 2023-08-03 RX ORDER — SODIUM CHLORIDE, SODIUM LACTATE, POTASSIUM CHLORIDE, CALCIUM CHLORIDE 600; 310; 30; 20 MG/100ML; MG/100ML; MG/100ML; MG/100ML
INJECTION, SOLUTION INTRAVENOUS CONTINUOUS
Status: DISCONTINUED | OUTPATIENT
Start: 2023-08-03 | End: 2023-08-03 | Stop reason: HOSPADM

## 2023-08-03 RX ORDER — ONDANSETRON 4 MG/1
4 TABLET, ORALLY DISINTEGRATING ORAL EVERY 6 HOURS PRN
Status: DISCONTINUED | OUTPATIENT
Start: 2023-08-03 | End: 2023-08-04 | Stop reason: HOSPADM

## 2023-08-03 RX ORDER — ONDANSETRON 2 MG/ML
4 INJECTION INTRAMUSCULAR; INTRAVENOUS
Status: COMPLETED | OUTPATIENT
Start: 2023-08-03 | End: 2023-08-03

## 2023-08-03 RX ORDER — NALOXONE HYDROCHLORIDE 0.4 MG/ML
0.4 INJECTION, SOLUTION INTRAMUSCULAR; INTRAVENOUS; SUBCUTANEOUS
Status: DISCONTINUED | OUTPATIENT
Start: 2023-08-03 | End: 2023-08-04 | Stop reason: HOSPADM

## 2023-08-03 RX ORDER — PROPOFOL 10 MG/ML
INJECTION, EMULSION INTRAVENOUS PRN
Status: DISCONTINUED | OUTPATIENT
Start: 2023-08-03 | End: 2023-08-03

## 2023-08-03 RX ORDER — PROCHLORPERAZINE MALEATE 10 MG
10 TABLET ORAL EVERY 6 HOURS PRN
Status: DISCONTINUED | OUTPATIENT
Start: 2023-08-03 | End: 2023-08-04 | Stop reason: HOSPADM

## 2023-08-03 RX ORDER — ONDANSETRON 2 MG/ML
4 INJECTION INTRAMUSCULAR; INTRAVENOUS EVERY 6 HOURS PRN
Status: DISCONTINUED | OUTPATIENT
Start: 2023-08-03 | End: 2023-08-04 | Stop reason: HOSPADM

## 2023-08-03 RX ORDER — LIDOCAINE HYDROCHLORIDE 20 MG/ML
INJECTION, SOLUTION INFILTRATION; PERINEURAL PRN
Status: DISCONTINUED | OUTPATIENT
Start: 2023-08-03 | End: 2023-08-03

## 2023-08-03 RX ORDER — PROPOFOL 10 MG/ML
INJECTION, EMULSION INTRAVENOUS CONTINUOUS PRN
Status: DISCONTINUED | OUTPATIENT
Start: 2023-08-03 | End: 2023-08-03

## 2023-08-03 RX ADMIN — SODIUM CHLORIDE, SODIUM LACTATE, POTASSIUM CHLORIDE, CALCIUM CHLORIDE: 600; 310; 30; 20 INJECTION, SOLUTION INTRAVENOUS at 11:19

## 2023-08-03 RX ADMIN — PROPOFOL 200 MCG/KG/MIN: 10 INJECTION, EMULSION INTRAVENOUS at 11:52

## 2023-08-03 RX ADMIN — ONDANSETRON 4 MG: 2 INJECTION INTRAMUSCULAR; INTRAVENOUS at 11:51

## 2023-08-03 RX ADMIN — LIDOCAINE HYDROCHLORIDE 40 MG: 20 INJECTION, SOLUTION INFILTRATION; PERINEURAL at 11:51

## 2023-08-03 RX ADMIN — PROPOFOL 120 MG: 10 INJECTION, EMULSION INTRAVENOUS at 11:51

## 2023-08-03 RX ADMIN — PROPOFOL 150 MCG/KG/MIN: 10 INJECTION, EMULSION INTRAVENOUS at 12:07

## 2023-08-03 ASSESSMENT — LIFESTYLE VARIABLES: TOBACCO_USE: 0

## 2023-08-03 ASSESSMENT — ENCOUNTER SYMPTOMS: ORTHOPNEA: 0

## 2023-08-03 ASSESSMENT — COPD QUESTIONNAIRES: COPD: 0

## 2023-08-03 NOTE — DISCHARGE INSTRUCTIONS
Discharge Instructions after Colonoscopy  or Sigmoidoscopy    Today you had a Colonoscopy    Activity and Diet  You were given medicine for pain. You may be dizzy or sleepy.  For 24 hours:   Do not drive or use heavy equipment.   Do not make important decisions.   Do not drink any alcohol.  You may return to your normal diet and medicines.    Discomfort   Air was placed in your colon during the exam in order to see it. Walking helps to pass the air.   You may take Tylenol (acetaminophen) for pain unless your doctor has told you not to.  Do not take aspirin or ibuprofen (Advil, Motrin, or other anti-inflammatory  drugs) for 2-3 days.    Follow-up  ____ We took small tissue samples or polyps to study. Your doctor will call you with the results  within two weeks.    When to call:    Call right away if you have:   Unusual pain in belly or chest pain not relieved with passing air.   More than 1 to 2 Tablespoons of bleeding from your rectum.   Fever above 100.6  F (37.5  C).    If you have severe pain, bleeding, or shortness of breath, go to an emergency room.    If you have questions, call:  Monday to Friday, 8 a.m. to 4:30 p.m.  Central Scheduling Department: [phone]    After hours  Hospital: [phone] (Ask for the GI fellow on call)

## 2023-08-03 NOTE — ANESTHESIA PREPROCEDURE EVALUATION
Anesthesia Pre-Procedure Evaluation    Patient: Lou Treviño   MRN: [id number] : 1985        Procedure : Procedure(s):  Colonoscopy with CO2 insufflation          Past Medical History:   Diagnosis Date    Migraine     Noninfectious ileitis     Uncomplicated asthma       Past Surgical History:   Procedure Laterality Date    INTUSSUSCEPTION REPAIR      Hemicolectomy, removal of 11 cm of proximal and distal colon      Allergies   Allergen Reactions    Codeine       Social History     Tobacco Use    Smoking status: Never    Smokeless tobacco: Never   Substance Use Topics    Alcohol use: Never      Wt Readings from Last 1 Encounters:   23 121.6 kg (268 lb)        Anesthesia Evaluation   Pt has had prior anesthetic. Type: General.    No history of anesthetic complications       ROS/MED HX  ENT/Pulmonary:     (+)                     asthma (Exercise induced. Has not needed rescue inhaler for weeks.)  Treatment: Inhaler prn,              (-) tobacco use, COPD and recent URI   Neurologic:     (+)      migraines,                          Cardiovascular:    (-) BEARDEN, orthopnea/PND and syncope   METS/Exercise Tolerance: >4 METS Comment: Goes up a flight of stairs at home without stopping. Denies concerning cardiopulmonary symptoms.    Hematologic:       Musculoskeletal:       GI/Hepatic:     (+) GERD (Occasional symptoms, none in last several days),                   Renal/Genitourinary:       Endo:     (+)               Obesity (BMI 41),       Psychiatric/Substance Use:       Infectious Disease:       Malignancy:       Other:            Physical Exam    Airway        Mallampati: I   TM distance: > 3 FB   Neck ROM: full   Mouth opening: > 3 cm    Respiratory Devices and Support         Dental         B=Bridge, C=Chipped, L=Loose, M=Missing    Cardiovascular          Rhythm and rate: regular and normal     Pulmonary           breath sounds clear to auscultation           OUTSIDE LABS:  CBC:   Lab Results    Component Value Date    WBC 12.5 (H) 07/14/2023    HGB 11.9 07/14/2023    HCT 37.6 07/14/2023     07/14/2023     BMP:   Lab Results   Component Value Date     07/14/2023     05/27/2022    POTASSIUM 4.0 07/14/2023    POTASSIUM 4.0 05/27/2022    CHLORIDE 103 07/14/2023    CHLORIDE 104 05/27/2022    CO2 23 07/14/2023    CO2 22 05/27/2022    BUN 6.6 07/14/2023    BUN 13 05/27/2022    CR 0.68 07/14/2023    CR 0.68 05/27/2022     (H) 07/14/2023    GLC 83 05/27/2022     COAGS: No results found for: PTT, INR, FIBR  POC: No results found for: BGM, HCG, HCGS  HEPATIC:   Lab Results   Component Value Date    ALBUMIN 3.5 07/14/2023    PROTTOTAL 7.8 07/14/2023    ALT 33 07/14/2023    AST 29 07/14/2023    ALKPHOS 76 07/14/2023    BILITOTAL <0.2 07/14/2023     OTHER:   Lab Results   Component Value Date    A1C 6.3 (H) 07/17/2023    JENNIFER 9.0 07/14/2023    LIPASE 31 06/10/2022    TSH 2.76 07/14/2023    SED 48 (H) 07/14/2023       Anesthesia Plan    ASA Status:  3    NPO Status:  NPO Appropriate    Anesthesia Type: MAC.     - Reason for MAC: straight local not clinically adequate              Consents    Anesthesia Plan(s) and associated risks, benefits, and realistic alternatives discussed. Questions answered and patient/representative(s) expressed understanding.     - Discussed:     - Discussed with:  Patient            Postoperative Care            Comments:    Other Comments: Patient feeling nauseated from colonoscopy prep, no emesis. Discussed that this is a common issue with prep and discussed possible increased aspiration risk. Given how often this causes nausea, it is reasonable to proceed, and patient wishes to proceed. Plan for ondansetron prior to procedure start. Discussed plan for MAC, including possibility of awareness, risk of aspiration pneumonia, risk of hypoxia/low oxygen/airway obstruction requiring additional airway support/devices. Discussed alternatives. Discussed backup plan of  general anesthesia and intubation. Ensured understanding, invited questions and all questions were answered.           H&P reviewed: Unable to attach H&P to encounter due to EHR limitations. H&P Update: appropriate H&P reviewed, patient examined. No interval changes since H&P (within 30 days).         Rosana Phan MD

## 2023-08-03 NOTE — H&P
Lou Treviño  [id number]  female  37 year old      Reason for procedure/surgery: diarrhea    Patient Active Problem List   Diagnosis    Mild persistent asthma    Migraines    IBS (irritable bowel syndrome)    Anxiety and depression    Allergies    History of intussusception    Family history of factor V deficiency    Generalized anxiety disorder    Healthcare maintenance    Prediabetes    Morbid obesity (H)    Nausea    Hypoalbuminemia    Hepatic steatosis    Left knee pain    Elevated fecal calprotectin       Past Surgical History:    Past Surgical History:   Procedure Laterality Date    INTUSSUSCEPTION REPAIR  1986    Hemicolectomy, removal of 11 cm of proximal and distal colon       Past Medical History:   Past Medical History:   Diagnosis Date    Migraine     Noninfectious ileitis     Uncomplicated asthma        Social History:   Social History     Tobacco Use    Smoking status: Never    Smokeless tobacco: Never   Substance Use Topics    Alcohol use: Never       Family History:   Family History   Problem Relation Age of Onset    Pulmonary Embolism Mother         In 2013 at age 63 years, Factor V deficiency, indefinite anticoagulation    Asthma Mother     Migraines Mother     Depression Mother     Depression Sister     Cancer Maternal Grandmother     Cancer Paternal Grandmother     Cerebrovascular Disease Paternal Grandfather     Glaucoma Maternal Great-Grandmother        Allergies:   Allergies   Allergen Reactions    Codeine        Active Medications:   Current Outpatient Medications   Medication Sig Dispense Refill    bisacodyl (DULCOLAX) 5 MG EC tablet 2 days prior to procedure, take 2 tablets at 4 pm. 1 day prior to procedure, take 2 tablets at 4 pm. For additional instructions refer to your colonoscopy prep instructions. 4 tablet 0    cetirizine (ZYRTEC) 10 MG tablet Take 1 tablet (10 mg) by mouth daily 90 tablet 1    citalopram (CELEXA) 20 MG tablet Take 1 tablet (20 mg) by mouth daily 90 tablet 1     famotidine (PEPCID) 20 MG tablet Take 1 tablet (20 mg) by mouth 2 times daily 180 tablet 0    fluticasone (FLONASE) 50 MCG/ACT nasal spray Spray 1 spray into both nostrils daily 18.2 mL 0    ibuprofen (ADVIL/MOTRIN) 200 MG tablet Take 200-400 mg by mouth every 6 hours as needed      levalbuterol (XOPENEX HFA) 45 MCG/ACT inhaler Inhale 1-2 puffs into the lungs every 6 hours as needed for wheezing 15 g 4    norethindrone-ethinyl estradiol (ORTHO-NOVUM) 1-35 MG-MCG tablet Take 1 tablet by mouth daily 84 tablet 3    polyethylene glycol (GOLYTELY) 236 g suspension 2 days prior at 5pm, mix and drink half of a jug of Golytely. Drink an 8 oz. glass of Golytely every 15 minutes until half of the jug is gone. Place remainder of Golytely in the refrigerator. 1 day prior at 5 pm, drink the 2nd half of a jug of Golytely bowel prep. 6 hours before your check-in time, drink an 8 oz. glass of Golytely every 15 minutes until half of the 2nd jug of Golytely is gone. Discard remainder of second jug. 8000 mL 0    propranolol ER (INDERAL LA) 60 MG 24 hr capsule Take 1 capsule (60 mg) by mouth daily 90 capsule 3    rimegepant (NURTEC) 75 MG ODT tablet Take 1 tablet (75 mg) by mouth every 48 hours 45 tablet 1       Systemic Review:   CONSTITUTIONAL: NEGATIVE for fever, chills, change in weight  ENT/MOUTH: NEGATIVE for ear, mouth and throat problems  RESP: NEGATIVE for significant cough or SOB  CV: NEGATIVE for chest pain, palpitations or peripheral edema    Physical Examination:   Vital Signs: /86   Temp 98.2  F (36.8  C) (Temporal)   Resp 16   SpO2 97%   GENERAL: healthy, alert and no distress  RESP: lungs clear to auscultation - no rales, rhonchi or wheezes  CV: regular rate and rhythm, normal S1 S2, no S3 or S4, no murmur, click or rub, no peripheral edema and peripheral pulses strong  ABDOMEN: soft, nontender, no hepatosplenomegaly, no masses and bowel sounds normal  MS: no gross musculoskeletal defects noted, no  edema    Plan: Appropriate to proceed as scheduled.      Zoraida Jamil MD  8/3/2023    PCP:  Silvia Groves

## 2023-08-03 NOTE — ANESTHESIA CARE TRANSFER NOTE
Patient: Lou Treviño    Procedure: Procedure(s):  COLONOSCOPY WITH BIOPSIES       Diagnosis: Loose stools [R19.5]  Fecal urgency [R15.2]  Positive fecal occult blood test [R19.5]  Elevated fecal calprotectin [R19.5]  Diarrhea, unspecified type [R19.7]  Elevated C-reactive protein (CRP) [R79.82]  Diagnosis Additional Information: No value filed.    Anesthesia Type:   MAC     Note:    Oropharynx: spontaneously breathing  Level of Consciousness: drowsy  Oxygen Supplementation: room air    Independent Airway: airway patency satisfactory and stable  Dentition: dentition unchanged  Vital Signs Stable: post-procedure vital signs reviewed and stable  Report to RN Given: handoff report given  Patient transferred to: Phase II    Handoff Report: Identifed the Patient, Identified the Reponsible Provider, Reviewed the pertinent medical history, Discussed the surgical course, Reviewed Intra-OP anesthesia mangement and issues during anesthesia, Set expectations for post-procedure period and Allowed opportunity for questions and acknowledgement of understanding      Vitals:  Vitals Value Taken Time   BP     Temp     Pulse 68 08/03/23 1215   Resp     SpO2         Electronically Signed By: ANDRES Demarco CRNA  August 3, 2023  12:21 PM

## 2023-08-07 LAB
PATH REPORT.COMMENTS IMP SPEC: NORMAL
PATH REPORT.FINAL DX SPEC: NORMAL
PATH REPORT.GROSS SPEC: NORMAL
PATH REPORT.MICROSCOPIC SPEC OTHER STN: NORMAL
PATH REPORT.RELEVANT HX SPEC: NORMAL
PHOTO IMAGE: NORMAL

## 2023-08-09 ENCOUNTER — TELEPHONE (OUTPATIENT)
Dept: GASTROENTEROLOGY | Facility: CLINIC | Age: 38
End: 2023-08-09
Payer: COMMERCIAL

## 2023-08-09 NOTE — TELEPHONE ENCOUNTER
[facility] Call Center    Phone Message    May a detailed message be left on voicemail: yes     Reason for Call: Other: Pt scheduled for first available appt w/ Dr Zoraida Jamil on 11/1 for a follow up.      Action Taken: Other: [facility]    Travel Screening: Not Applicable

## 2023-08-10 DIAGNOSIS — Z51.81 ENCOUNTER FOR MEDICATION MONITORING: Primary | ICD-10-CM

## 2023-08-10 DIAGNOSIS — K51.00 ULCERATIVE PANCOLITIS WITHOUT COMPLICATION (H): ICD-10-CM

## 2023-08-10 RX ORDER — PREDNISONE 10 MG/1
TABLET ORAL
Qty: 77 TABLET | Refills: 0 | Status: SHIPPED | OUTPATIENT
Start: 2023-08-10 | End: 2023-09-15

## 2023-08-10 NOTE — RESULT ENCOUNTER NOTE
Colon path c/w IBD.  Plan: discuss with patient, likely start Remicade, will need lab screening tests prior (TB, HIV, HepB core and sAb, HCV), [facility] referral. Baseline calprotectin, CRP, ESR, CBC, hepatic panel complete.

## 2023-08-15 ENCOUNTER — TELEPHONE (OUTPATIENT)
Dept: GASTROENTEROLOGY | Facility: CLINIC | Age: 38
End: 2023-08-15
Payer: COMMERCIAL

## 2023-08-15 NOTE — TELEPHONE ENCOUNTER
Writer received a message from JASWINDER HSIEH to help get Pt scheduled for an appointment with Dr. Ivey, [facility] [place]. Writer called and talked with Pt. Pt is now scheduled for an appointment with Dr. Ivey on 8/30/2023 at 8am for a video visit.

## 2023-08-15 NOTE — TELEPHONE ENCOUNTER
OCP alternative sent to mail order pharmacy at request of patient. Switched from norgestimate/ethinyl estradiol 1.5/30 mg/mcg to norgestimate/ethinyl estradiol 1/35 mg/mcg due to lack of availability of initially prescribed formulation at her new pharmacy.     Also sent over citalopram.    Will defer other prescriptions pending consultation with patient re: cost.    Silvia Groves MD    fever at home/urinary symptoms

## 2023-08-27 ASSESSMENT — ENCOUNTER SYMPTOMS
DIARRHEA: 1
VOMITING: 0
JOINT SWELLING: 0
HOT FLASHES: 0
ARTHRALGIAS: 1
CONSTIPATION: 0
NAUSEA: 0
ABDOMINAL PAIN: 0
MUSCLE CRAMPS: 0
JAUNDICE: 0
MUSCLE WEAKNESS: 0
BOWEL INCONTINENCE: 0
BLOOD IN STOOL: 0
BACK PAIN: 0
STIFFNESS: 0
BLOATING: 0
RECTAL PAIN: 0
NECK PAIN: 0
DECREASED LIBIDO: 0
HEARTBURN: 1
MYALGIAS: 1

## 2023-08-30 ENCOUNTER — VIRTUAL VISIT (OUTPATIENT)
Dept: GASTROENTEROLOGY | Facility: CLINIC | Age: 38
End: 2023-08-30
Payer: COMMERCIAL

## 2023-08-30 VITALS — BODY MASS INDEX: 39.99 KG/M2 | WEIGHT: 270 LBS | HEIGHT: 69 IN

## 2023-08-30 DIAGNOSIS — K50.811 CROHN'S DISEASE OF BOTH SMALL AND LARGE INTESTINE WITH RECTAL BLEEDING (H): Primary | ICD-10-CM

## 2023-08-30 PROCEDURE — 99215 OFFICE O/P EST HI 40 MIN: CPT | Mod: GC | Performed by: INTERNAL MEDICINE

## 2023-08-30 ASSESSMENT — PAIN SCALES - GENERAL: PAINLEVEL: NO PAIN (0)

## 2023-08-30 NOTE — NURSING NOTE
Is the patient currently in the state of [place]? YES    Visit mode:VIDEO    If the visit is dropped, the patient can be reconnected by: VIDEO VISIT: Send to e-mail at: [email or web address]    Will anyone else be joining the visit? NO  (If patient encounters technical issues they should call [phone]    How would you like to obtain your AVS? MyChart    Are changes needed to the allergy or medication list? No    Reason for visit: Consult    Pierre ELLIS

## 2023-08-30 NOTE — PROGRESS NOTES
GASTROENTEROLOGY IBD  OUTPATIENT CLINIC CONSULT    DATE OF SERVICE: 8/30/2023  PROVIDER REQUESTING CONSULT: Referred Self  Reason for Consultation: New diagnosis of Crohn's disease     ASSESSMENT:  37-year-old female with prediabetes, elevated BMI, status post right hemicolectomy (for intussusception as an infant) with new diagnosis of Crohn's disease who presents to the clinic to establish care.      She underwent colonoscopy early August 2023 for persistent diarrhea with evidence of fissuring and ulceration in descending colon, erythematous mucosa in transverse colon, mild inflammation at IC valve. Corresponding histopathology is as as follows: severe chronic colitis in the descending colon, moderate colitis in the transverse colon, mild active colitis in the rectum, normal endoscopic exam of the ileum with histologic patchy ileitis.  Her symptoms were controlled on prednisone 30 mg and once this dose was tapered to 20 mg she started having symptoms again.  CRP is elevated to 46.2, calprotectin is 340.     Overall patient has moderate to severe Crohn's and would benefit from biologic/small molecule inhibitor therapy.  We discussed about various options at length with the patient, this included Remicade, Humira, Stelara, Entyvio, Skyrizi and Rinvoq.  We walked her through risks and benefits of all these medications.  Patient is a little concerned about potential side effect profile of Remicade, she would also like to avoid Rinvoq in the setting of known hypercoagulable disease in her mother.  At this point she is interested in Skyrizi.    RECOMMENDATIONS:  --Will increase prednisone to 30 mg once daily until maintenance therapy is established  --We will submit prior authorization for Skyrizi  -- [facility] pharmacy referral for detailed discussion about medications and for healthcare maintenance in the setting of IBD  -- Follow up in 3 months, will discuss about repeat colonoscopy to assess response to therapy in 6 months  (will discuss this at the time of next visit)     Paresh Howard MD on 8/30/2023 at 4:50 PM      Thank you for this consultation.  It was a pleasure to participate in the care of this patient; please contact us with any further questions.  A total of 60 minutes was spent in face to face evaluation with this patient, of which was included chart review, history and exam, documentation, counseling, coordinating a management plan and further activities as noted above for this patient on the date of the encounter.     ________________________________________________________________  HPI:    37 yr female with prediabetes, elevated BMI, status post right hemicolectomy (for intussusception as an infant) new diagnosis of Crohn's disease who presented to the clinic to establish care.     Pt reports that she had surgery for intususception when she was 3 months old, this involved a right hemicolectomy with a ileotransverse anastomosis.  Since then patient has soft stool about 1-2 times daily.    Last year she had a 2-month long episode of diarrhea (increased frequency of stool) associated with abdominal cramps.  She was seen by her primary care provider who prescribed Pepcid.  Her symptoms seem to have abated after 2 months.  That episode was attributed to likely a sequela of her prior colon surgery.    More recently late July to early August she started having diarrhea which she describes as multiple bowel movements throughout the day this was associated with urgency and tenesmus.  In addition she reports seeing blood in the stool intermittently.  This led to further work-up which involved testing for enteric panel and C. difficile which were negative.  Subsequently she was referred to GI, was seen by Dr. Zoraida Barrett who did a colonoscopy on her that showed evidence of Crohn's disease.  Patient was then started on prednisone 40 mg with a plan to taper 10 mg every week.    She is currently on prednisone 20 mg.  She reports that  while she was on prednisone 30 mg she had good control of symptoms but within two days of reducing dose to 20 mg she started to experience symptoms again.  With regards to side effects while she was on prednisone, she reports increased appetite.     She denies any family history of inflammatory bowel disease or colon cancer.  No family history of any other autoimmune diseases.  She does report that her mother has a pulmonary embolism which was idiopathic and is on lifelong anticoagulation.  At one point of time patient was also tested for hypercoagulability disorders and reportedly the work-up was negative..    Prior to the diagnosis of Crohn's she was using ibuprofen about 1-2 times per week for relief of headaches.  She also reports joint pains predominantly in the hands and shoulder region along with back pain.  She denies any redness or swelling of the joints and reported that the joint pains did not respond to prednisone.    Patient denies any history of smoking or alcohol.  She works as a  and is mostly involved with IT work.  She has no plans for having kids.      ROS  Patient denies jaundice, abdominal distension, lower extremity edema, lethargy or confusion.  Patient denies fevers, sweats, chills or weight loss.    PMHx:  Past Medical History:   Diagnosis Date    Migraine     Noninfectious ileitis     Uncomplicated asthma      Patient Active Problem List   Diagnosis    Mild persistent asthma    Migraines    IBS (irritable bowel syndrome)    Anxiety and depression    Allergies    History of intussusception    Family history of factor V deficiency    Generalized anxiety disorder    Healthcare maintenance    Prediabetes    Morbid obesity (H)    Nausea    Hypoalbuminemia    Hepatic steatosis    Left knee pain    Elevated fecal calprotectin       PSurgHx:  Past Surgical History:   Procedure Laterality Date    COLONOSCOPY N/A 8/3/2023    Procedure: COLONOSCOPY WITH BIOPSIES;  Surgeon: Omero  Zoraida ROLAND MD;  Location: [facility] OR    INTUSSUSCEPTION REPAIR  1986    Hemicolectomy, removal of 11 cm of proximal and distal colon       MEDS:  Current Outpatient Medications   Medication    cetirizine (ZYRTEC) 10 MG tablet    citalopram (CELEXA) 20 MG tablet    famotidine (PEPCID) 20 MG tablet    fluticasone (FLONASE) 50 MCG/ACT nasal spray    ibuprofen (ADVIL/MOTRIN) 200 MG tablet    levalbuterol (XOPENEX HFA) 45 MCG/ACT inhaler    norethindrone-ethinyl estradiol (ORTHO-NOVUM) 1-35 MG-MCG tablet    predniSONE (DELTASONE) 10 MG tablet    propranolol ER (INDERAL LA) 60 MG 24 hr capsule    rimegepant (NURTEC) 75 MG ODT tablet    bisacodyl (DULCOLAX) 5 MG EC tablet    polyethylene glycol (GOLYTELY) 236 g suspension     No current facility-administered medications for this visit.     ALLERGIES:    Allergies   Allergen Reactions    Codeine      FHx:  Family History   Problem Relation Age of Onset    Pulmonary Embolism Mother         In 2013 at age 63 years, Factor V deficiency, indefinite anticoagulation    Asthma Mother     Migraines Mother     Depression Mother     Depression Sister     Cancer Maternal Grandmother     Cancer Paternal Grandmother     Cerebrovascular Disease Paternal Grandfather     Glaucoma Maternal Great-Grandmother        SOCIAL Hx:  Social History     Socioeconomic History    Marital status: Single     Spouse name: Not on file    Number of children: Not on file    Years of education: Not on file    Highest education level: Not on file   Occupational History    Not on file   Tobacco Use    Smoking status: Never    Smokeless tobacco: Never   Substance and Sexual Activity    Alcohol use: Never    Drug use: Never    Sexual activity: Yes     Partners: Male     Birth control/protection: Pill   Other Topics Concern    Not on file   Social History Narrative    6/21/2021 Employed as an  at [employer]. Still working from home but anticipating a return to the office. Male partner. Has a cat.   "    Social Determinants of Health     Financial Resource Strain: Not on file   Food Insecurity: Not on file   Transportation Needs: Not on file   Physical Activity: Not on file   Stress: Not on file   Social Connections: Not on file   Intimate Partner Violence: Not on file   Housing Stability: Not on file       ROS: A comprehensive Review of Systems was asked and answered in the negative unless specifically commented upon in the HPI    Physical Exam  Ht 1.74 m (5' 8.5\")   Wt 122.5 kg (270 lb)   BMI 40.46 kg/m    Body mass index is 40.46 kg/m .  Limited exam VVC visit   Gen:Pt in no acute distress  HEENT: no scleral icterus.  Lungs: Non labored breathing  Skin: no jaundice  Neuro: Alert and oriented x 3     LABS:  Hospital Outpatient Visit on 08/03/2023   Component Date Value Ref Range Status    HCG Qual Urine 08/03/2023 Negative  Negative Final    Internal QC Check POCT 08/03/2023 Valid  Valid Final    POCT Kit Lot Number 08/03/2023 [id number]   Final    POCT Kit Expiration Date 08/03/2023 12/31/2023   Final    CRP Inflammation 08/03/2023 46.20 (H)  <5.00 mg/L Final    Erythrocyte Sedimentation Rate 08/03/2023 37 (H)  0 - 20 mm/hr Final    Vitamin B12 08/03/2023 589  232 - 1,245 pg/mL Final    Case Report 08/03/2023    Final                    Value:Surgical Pathology Report                         Case: [id number]                                  Authorizing Provider:  Zoraida Jamil MD        Collected:           08/03/2023 11:54 AM          Ordering Location:     [facility] [place] OR  Received:            08/03/2023 01:39 PM                                 [facility]                                                                  Pathologist:           Carol Rodriguez MD                                                             Specimens:   A) - Other, IIeum Biopsies                                                                          B) - Other, IIeal Colonic Anastomosis Biopsies                         "                              C) - Other, Tranverse Colon Biopsies                                                                D) - Other, Colon Biopsies 45 cm                                                                    E) - Other, Descending Colon Biopsies                                                                                         F) - Other, Rectal Biopsies                                                                Final Diagnosis 08/03/2023    Final                    Value:This result contains rich text formatting which cannot be displayed here.    Comment 08/03/2023    Final                    Value:This result contains rich text formatting which cannot be displayed here.    Clinical Information 08/03/2023    Final                    Value:This result contains rich text formatting which cannot be displayed here.    Gross Description 08/03/2023    Final                    Value:This result contains rich text formatting which cannot be displayed here.    Microscopic Description 08/03/2023    Final                    Value:This result contains rich text formatting which cannot be displayed here.    Performing Labs 08/03/2023    Final                    Value:This result contains rich text formatting which cannot be displayed here.    COLONOSCOPY 08/03/2023    Final                    Value:[facility]  [address] [place], [place] [address]., [place] [address] [phone]     Endoscopy Department  _______________________________________________________________________________  Patient Name: Lou Treviño          Procedure Date: 8/3/2023 10:32 AM  MRN: [id number]                       Account Number: [id number]  YOB: 1985               Admit Type: Outpatient  Age: 37                               [place] [facility]  Gender: Female                        Note Status: Finalized  Attending MD: AR FREED MD,     Total Sedation Time:    _______________________________________________________________________________     Procedure:             Colonoscopy  Indications:           Clinically significant diarrhea of unexplained origin  Providers:             AR FREED MD, Kristal Schroeder RN, Rachel Sam CRNA  Referring MD:            Medicines:             Monitored Anesthesia Care  Complications:         No immediate complicat                          ions.  _______________________________________________________________________________  Procedure:             Pre-Anesthesia Assessment:                         - Prior to the procedure, a History and Physical was                          performed, and patient medications and allergies were                          reviewed. The patient is competent. The risks and                          benefits of the procedure and the sedation options and                          risks were discussed with the patient. All questions                          were answered and informed consent was obtained.                          Patient identification and proposed procedure were                          verified by the physician in the pre-procedure area.                          Mental Status Examination: alert and oriented. Airway                          Examination: normal oropharyngeal airway and neck                          mobility. Respiratory Examination: clear to                                                    auscultation. CV Examination: normal. Prophylactic                          Antibiotics: The patient does not require prophylactic                          antibiotics. Prior Anticoagulants: The patient has                          taken no anticoagulant or antiplatelet agents. ASA                          Grade Assessment: per anaesthesiology. After reviewing                          the risks and benefits, the patient was deemed in                           satisfactory condition to undergo the procedure. The                          anesthesia plan was to use monitored anesthesia care                          (MAC). Immediately prior to administration of                          medications, the patient was re-assessed for adequacy                          to receive sedatives. The heart rate, respiratory                          rate, oxygen saturations, blood pressure, adequacy of                          pulmonary ventilation, and response to care were                                                    monitored throughout the procedure. The physical                          status of the patient was re-assessed after the                          procedure.                         After obtaining informed consent, the colonoscope was                          passed under direct vision. Throughout the procedure,                          the patient's blood pressure, pulse, and oxygen                          saturations were monitored continuously. The                          Colonoscope was introduced through the anus with the                          intention of advancing to the surgical stoma. The                          scope was advanced to the descending colon before the                          procedure was aborted. Medications were given. The                          Colonoscope was introduced through the anus and                          advanced to the ileocolonic anastomosis. The                          colonoscopy was performed without difficulty. The                                                    patient tolerated the procedure well. The quality of                          the bowel preparation was good. Ileum, IC anastomosis                          and rectum were photographed.                                                                                   Findings:       The diamond-terminal ileum appeared normal. Biopsies were taken with a  cold        forceps for histology.       Ileocolonic anastomosis identified and biopsied.       Inflammation noted in the colon: rectum was relatively spared however        there were a few scattered erosions and these were boipsied. From 10 cm        to 45 cm inflammation was severe, with deep fissuring ulcerations and        these were biopsied. From 45cm to 55 cm the mucosa hhad a normal or        erythematous appearance and this was biopsied. From 55 cm to the IC        anastomosis the inflammation was mild with loss of vascularity and        mildly granular appearance and this persisted to the anastomosis. The        ileum nusrat                          eared normal.                                                                                   Impression:            - The examined portion of the ileum was normal.                          Biopsied.                         - Ileocolonic anastomosis identified and biopsied.                         Inflammation noted in the colon: rectum was relatively                          spared however there were a few scattered erosions and                          these were boipsied. From 10 cm to 45 cm inflammation                          was severe, with deep fissuring ulcerations and these                          were biopsied. From 45cm to 55 cm the mucosa hhad a                          normal or erythematous appearance and this was                          biopsied. From 55 cm to the IC anastomosis the                          inflammation was mild with loss of vascularity and                          mildly granular appearance and this persisted to the                          anasto                          mosis. The ileum appeared normal.  Recommendation:        - Patient has a contact number available for                          emergencies. The signs and symptoms of potential                          delayed complications were discussed with the patient.                           Return to normal activities tomorrow. Written                          discharge instructions were provided to the patient.                         - Resume previous diet.                         - Continue present medications.                         - Await pathology results.                         - Check CBC, ESR, CRP, Vit B12 today for baseline. CMP                          completed recently. today.                                                                                     Electronically Signed by: Dr. Ar Jamil  __________________  AR JAMIL MD  8/3/2023 12:31:22 PM  I was physically present for the entire viewing portion of the exam.  __________________________  Signature of michael presley physician  AR JAMIL MD  Number of Addenda: 0    Note Initiated On: 8/3/2023 10:32 AM  Scope In:  Scope Out:         Final Diagnosis   A. ILEUM, BIOPSY:  - Patchy mild active ileitis with cryptitis     B. ILEOCOLONIC ANASTOMOSIS, BIOPSY:  - Mildly active chronic colitis  - Negative for dysplasia     C. COLON, TRANSVERSE, BIOPSY:  - Moderate chronic active colitis with cryptitis and crypt abscess formation  - Negative for dysplasia     D. COLON, AT 45 CM, BIOPSY:  - Mildly active chronic colitis with cryptitis  - Negative for dysplasia     E. COLON, DESCENDING, BIOPSY:  - Severe chronic active colitis with focal ulceration  - Negative for dysplasia     F. COLON, RECTUM, BIOPSY:  - Mild focally active chronic colitis  - Negative for dysplasia

## 2023-08-30 NOTE — LETTER
8/30/2023         RE: Lou Treviño  [address]  [place] [address]        Dear Colleague,    Thank you for referring your patient, Lou Treviño, to the [facility] [place]. Please see a copy of my visit note below.    Virtual Visit Details    Type of service:  Video Visit     Originating Location (pt. Location): Home    Distant Location (provider location):  On-site  Platform used for Video Visit: [facility]      GASTROENTEROLOGY IBD  OUTPATIENT CLINIC CONSULT    DATE OF SERVICE: 8/30/2023  PROVIDER REQUESTING CONSULT: Referred Self  Reason for Consultation: New diagnosis of Crohn's disease     ASSESSMENT:  37-year-old female with prediabetes, elevated BMI, status post right hemicolectomy (for intussusception as an infant) with new diagnosis of Crohn's disease who presents to the clinic to establish care.      She underwent colonoscopy early August 2023 for persistent diarrhea with evidence of fissuring and ulceration in descending colon, erythematous mucosa in transverse colon, mild inflammation at IC valve. Corresponding histopathology is as as follows: severe chronic colitis in the descending colon, moderate colitis in the transverse colon, mild active colitis in the rectum, normal endoscopic exam of the ileum with histologic patchy ileitis.  Her symptoms were controlled on prednisone 30 mg and once this dose was tapered to 20 mg she started having symptoms again.  CRP is elevated to 46.2, calprotectin is 340.     Overall patient has moderate to severe Crohn's and would benefit from biologic/small molecule inhibitor therapy.  We discussed about various options at length with the patient, this included Remicade, Humira, Stelara, Entyvio, Skyrizi and Rinvoq.  We walked her through risks and benefits of all these medications.  Patient is a little concerned about potential side effect profile of Remicade, she would also like to avoid Rinvoq in the setting of known  hypercoagulable disease in her mother.  At this point she is interested in Skyrizi.    RECOMMENDATIONS:  --Will increase prednisone to 30 mg once daily until maintenance therapy is established  --We will submit prior authorization for Skyrizi  -- [facility] pharmacy referral for detailed discussion about medications and for healthcare maintenance in the setting of IBD  -- Follow up in 3 months, will discuss about repeat colonoscopy to assess response to therapy in 6 months (will discuss this at the time of next visit)     Paresh Howard MD on 8/30/2023 at 4:50 PM      Thank you for this consultation.  It was a pleasure to participate in the care of this patient; please contact us with any further questions.  A total of 60 minutes was spent in face to face evaluation with this patient, of which was included chart review, history and exam, documentation, counseling, coordinating a management plan and further activities as noted above for this patient on the date of the encounter.     ________________________________________________________________  HPI:    37 yr female with prediabetes, elevated BMI, status post right hemicolectomy (for intussusception as an infant) new diagnosis of Crohn's disease who presented to the clinic to establish care.     Pt reports that she had surgery for intususception when she was 3 months old, this involved a right hemicolectomy with a ileotransverse anastomosis.  Since then patient has soft stool about 1-2 times daily.    Last year she had a 2-month long episode of diarrhea (increased frequency of stool) associated with abdominal cramps.  She was seen by her primary care provider who prescribed Pepcid.  Her symptoms seem to have abated after 2 months.  That episode was attributed to likely a sequela of her prior colon surgery.    More recently late July to early August she started having diarrhea which she describes as multiple bowel movements throughout the day this was associated with urgency  and tenesmus.  In addition she reports seeing blood in the stool intermittently.  This led to further work-up which involved testing for enteric panel and C. difficile which were negative.  Subsequently she was referred to GI, was seen by Dr. Zoraida Barrett who did a colonoscopy on her that showed evidence of Crohn's disease.  Patient was then started on prednisone 40 mg with a plan to taper 10 mg every week.    She is currently on prednisone 20 mg.  She reports that while she was on prednisone 30 mg she had good control of symptoms but within two days of reducing dose to 20 mg she started to experience symptoms again.  With regards to side effects while she was on prednisone, she reports increased appetite.     She denies any family history of inflammatory bowel disease or colon cancer.  No family history of any other autoimmune diseases.  She does report that her mother has a pulmonary embolism which was idiopathic and is on lifelong anticoagulation.  At one point of time patient was also tested for hypercoagulability disorders and reportedly the work-up was negative..    Prior to the diagnosis of Crohn's she was using ibuprofen about 1-2 times per week for relief of headaches.  She also reports joint pains predominantly in the hands and shoulder region along with back pain.  She denies any redness or swelling of the joints and reported that the joint pains did not respond to prednisone.    Patient denies any history of smoking or alcohol.  She works as a  and is mostly involved with IT work.  She has no plans for having kids.      ROS  Patient denies jaundice, abdominal distension, lower extremity edema, lethargy or confusion.  Patient denies fevers, sweats, chills or weight loss.    PMHx:  Past Medical History:   Diagnosis Date    Migraine     Noninfectious ileitis     Uncomplicated asthma      Patient Active Problem List   Diagnosis    Mild persistent asthma    Migraines    IBS (irritable bowel  syndrome)    Anxiety and depression    Allergies    History of intussusception    Family history of factor V deficiency    Generalized anxiety disorder    Healthcare maintenance    Prediabetes    Morbid obesity (H)    Nausea    Hypoalbuminemia    Hepatic steatosis    Left knee pain    Elevated fecal calprotectin       PSurgHx:  Past Surgical History:   Procedure Laterality Date    COLONOSCOPY N/A 8/3/2023    Procedure: COLONOSCOPY WITH BIOPSIES;  Surgeon: Zoraida Jamil MD;  Location: [facility] OR    INTUSSUSCEPTION REPAIR  1986    Hemicolectomy, removal of 11 cm of proximal and distal colon       MEDS:  Current Outpatient Medications   Medication    cetirizine (ZYRTEC) 10 MG tablet    citalopram (CELEXA) 20 MG tablet    famotidine (PEPCID) 20 MG tablet    fluticasone (FLONASE) 50 MCG/ACT nasal spray    ibuprofen (ADVIL/MOTRIN) 200 MG tablet    levalbuterol (XOPENEX HFA) 45 MCG/ACT inhaler    norethindrone-ethinyl estradiol (ORTHO-NOVUM) 1-35 MG-MCG tablet    predniSONE (DELTASONE) 10 MG tablet    propranolol ER (INDERAL LA) 60 MG 24 hr capsule    rimegepant (NURTEC) 75 MG ODT tablet    bisacodyl (DULCOLAX) 5 MG EC tablet    polyethylene glycol (GOLYTELY) 236 g suspension     No current facility-administered medications for this visit.     ALLERGIES:    Allergies   Allergen Reactions    Codeine      FHx:  Family History   Problem Relation Age of Onset    Pulmonary Embolism Mother         In 2013 at age 63 years, Factor V deficiency, indefinite anticoagulation    Asthma Mother     Migraines Mother     Depression Mother     Depression Sister     Cancer Maternal Grandmother     Cancer Paternal Grandmother     Cerebrovascular Disease Paternal Grandfather     Glaucoma Maternal Great-Grandmother        SOCIAL Hx:  Social History     Socioeconomic History    Marital status: Single     Spouse name: Not on file    Number of children: Not on file    Years of education: Not on file    Highest education level: Not on file  "  Occupational History    Not on file   Tobacco Use    Smoking status: Never    Smokeless tobacco: Never   Substance and Sexual Activity    Alcohol use: Never    Drug use: Never    Sexual activity: Yes     Partners: Male     Birth control/protection: Pill   Other Topics Concern    Not on file   Social History Narrative    6/21/2021 Employed as an  at [employer]. Still working from home but anticipating a return to the office. Male partner. Has a cat.      Social Determinants of Health     Financial Resource Strain: Not on file   Food Insecurity: Not on file   Transportation Needs: Not on file   Physical Activity: Not on file   Stress: Not on file   Social Connections: Not on file   Intimate Partner Violence: Not on file   Housing Stability: Not on file       ROS: A comprehensive Review of Systems was asked and answered in the negative unless specifically commented upon in the HPI    Physical Exam  Ht 1.74 m (5' 8.5\")   Wt 122.5 kg (270 lb)   BMI 40.46 kg/m    Body mass index is 40.46 kg/m .  Limited exam VVC visit   Gen:Pt in no acute distress  HEENT: no scleral icterus.  Lungs: Non labored breathing  Skin: no jaundice  Neuro: Alert and oriented x 3     LABS:  Hospital Outpatient Visit on 08/03/2023   Component Date Value Ref Range Status    HCG Qual Urine 08/03/2023 Negative  Negative Final    Internal QC Check POCT 08/03/2023 Valid  Valid Final    POCT Kit Lot Number 08/03/2023 [id number]   Final    POCT Kit Expiration Date 08/03/2023 12/31/2023   Final    CRP Inflammation 08/03/2023 46.20 (H)  <5.00 mg/L Final    Erythrocyte Sedimentation Rate 08/03/2023 37 (H)  0 - 20 mm/hr Final    Vitamin B12 08/03/2023 589  232 - 1,245 pg/mL Final    Case Report 08/03/2023    Final                    Value:Surgical Pathology Report                         Case: [id number]                                  Authorizing Provider:  Zoraida Jamil MD        Collected:           08/03/2023 11:54 AM          Ordering " Location:     [facility] Main OR  Received:            08/03/2023 01:39 PM                                 [place]                                                                  Pathologist:           Carol Rodriguez MD                                                             Specimens:   A) - Other, IIeum Biopsies                                                                          B) - Other, IIeal Colonic Anastomosis Biopsies                                                      C) - Other, Tranverse Colon Biopsies                                                                D) - Other, Colon Biopsies 45 cm                                                                    E) - Other, Descending Colon Biopsies                                                                                         F) - Other, Rectal Biopsies                                                                Final Diagnosis 08/03/2023    Final                    Value:This result contains rich text formatting which cannot be displayed here.    Comment 08/03/2023    Final                    Value:This result contains rich text formatting which cannot be displayed here.    Clinical Information 08/03/2023    Final                    Value:This result contains rich text formatting which cannot be displayed here.    Gross Description 08/03/2023    Final                    Value:This result contains rich text formatting which cannot be displayed here.    Microscopic Description 08/03/2023    Final                    Value:This result contains rich text formatting which cannot be displayed here.    Performing Labs 08/03/2023    Final                    Value:This result contains rich text formatting which cannot be displayed here.    COLONOSCOPY 08/03/2023    Final                    Value:[facility]  [address] [place], [place] [address]., [place] [address] [phone]     Endoscopy  Department  _______________________________________________________________________________  Patient Name: Lou Treviño          Procedure Date: 8/3/2023 10:32 AM  MRN: [id number]                       Account Number: [id number]  YOB: 1985               Admit Type: Outpatient  Age: 37                               Room: [facility] PROCEDURE ROOM 03  Gender: Female                        Note Status: Finalized  Attending MD: AR FREED MD,     Total Sedation Time:   _______________________________________________________________________________     Procedure:             Colonoscopy  Indications:           Clinically significant diarrhea of unexplained origin  Providers:             AR FREED MD, Kristal Schroeder, JASWINDER, Rachel Sam,                          CRNA  Referring MD:            Medicines:             Monitored Anesthesia Care  Complications:         No immediate complicat                          ions.  _______________________________________________________________________________  Procedure:             Pre-Anesthesia Assessment:                         - Prior to the procedure, a History and Physical was                          performed, and patient medications and allergies were                          reviewed. The patient is competent. The risks and                          benefits of the procedure and the sedation options and                          risks were discussed with the patient. All questions                          were answered and informed consent was obtained.                          Patient identification and proposed procedure were                          verified by the physician in the pre-procedure area.                          Mental Status Examination: alert and oriented. Airway                          Examination: normal oropharyngeal airway and neck                          mobility. Respiratory Examination: clear to                                                     auscultation. CV Examination: normal. Prophylactic                          Antibiotics: The patient does not require prophylactic                          antibiotics. Prior Anticoagulants: The patient has                          taken no anticoagulant or antiplatelet agents. ASA                          Grade Assessment: per anaesthesiology. After reviewing                          the risks and benefits, the patient was deemed in                          satisfactory condition to undergo the procedure. The                          anesthesia plan was to use monitored anesthesia care                          (MAC). Immediately prior to administration of                          medications, the patient was re-assessed for adequacy                          to receive sedatives. The heart rate, respiratory                          rate, oxygen saturations, blood pressure, adequacy of                          pulmonary ventilation, and response to care were                                                    monitored throughout the procedure. The physical                          status of the patient was re-assessed after the                          procedure.                         After obtaining informed consent, the colonoscope was                          passed under direct vision. Throughout the procedure,                          the patient's blood pressure, pulse, and oxygen                          saturations were monitored continuously. The                          Colonoscope was introduced through the anus with the                          intention of advancing to the surgical stoma. The                          scope was advanced to the descending colon before the                          procedure was aborted. Medications were given. The                          Colonoscope was introduced through the anus and                          advanced to the ileocolonic anastomosis. The                           colonoscopy was performed without difficulty. The                                                    patient tolerated the procedure well. The quality of                          the bowel preparation was good. Ileum, IC anastomosis                          and rectum were photographed.                                                                                   Findings:       The diamond-terminal ileum appeared normal. Biopsies were taken with a cold        forceps for histology.       Ileocolonic anastomosis identified and biopsied.       Inflammation noted in the colon: rectum was relatively spared however        there were a few scattered erosions and these were boipsied. From 10 cm        to 45 cm inflammation was severe, with deep fissuring ulcerations and        these were biopsied. From 45cm to 55 cm the mucosa hhad a normal or        erythematous appearance and this was biopsied. From 55 cm to the IC        anastomosis the inflammation was mild with loss of vascularity and        mildly granular appearance and this persisted to the anastomosis. The        ileum nusrat                          eared normal.                                                                                   Impression:            - The examined portion of the ileum was normal.                          Biopsied.                         - Ileocolonic anastomosis identified and biopsied.                         Inflammation noted in the colon: rectum was relatively                          spared however there were a few scattered erosions and                          these were boipsied. From 10 cm to 45 cm inflammation                          was severe, with deep fissuring ulcerations and these                          were biopsied. From 45cm to 55 cm the mucosa hhad a                          normal or erythematous appearance and this was                          biopsied. From 55 cm to the IC anastomosis  the                          inflammation was mild with loss of vascularity and                          mildly granular appearance and this persisted to the                          anasto                          mosis. The ileum appeared normal.  Recommendation:        - Patient has a contact number available for                          emergencies. The signs and symptoms of potential                          delayed complications were discussed with the patient.                          Return to normal activities tomorrow. Written                          discharge instructions were provided to the patient.                         - Resume previous diet.                         - Continue present medications.                         - Await pathology results.                         - Check CBC, ESR, CRP, Vit B12 today for baseline. CMP                          completed recently. today.                                                                                     Electronically Signed by: Dr. Ar Jamil  __________________  AR JAMIL MD  8/3/2023 12:31:22 PM  I was physically present for the entire viewing portion of the exam.  __________________________  Signature of michael presley physician  AR JAMIL MD  Number of Addenda: 0    Note Initiated On: 8/3/2023 10:32 AM  Scope In:  Scope Out:         Final Diagnosis   A. ILEUM, BIOPSY:  - Patchy mild active ileitis with cryptitis     B. ILEOCOLONIC ANASTOMOSIS, BIOPSY:  - Mildly active chronic colitis  - Negative for dysplasia     C. COLON, TRANSVERSE, BIOPSY:  - Moderate chronic active colitis with cryptitis and crypt abscess formation  - Negative for dysplasia     D. COLON, AT 45 CM, BIOPSY:  - Mildly active chronic colitis with cryptitis  - Negative for dysplasia     E. COLON, DESCENDING, BIOPSY:  - Severe chronic active colitis with focal ulceration  - Negative for dysplasia     F. COLON, RECTUM, BIOPSY:  - Mild focally  active chronic colitis  - Negative for dysplasia           Again, thank you for allowing me to participate in the care of your patient.      Sincerely,    Aiyana Ivey MD

## 2023-08-30 NOTE — PROGRESS NOTES
Virtual Visit Details    Type of service:  Video Visit     Originating Location (pt. Location): Home    Distant Location (provider location):  On-site  Platform used for Video Visit: [employer]

## 2023-08-31 ENCOUNTER — MYC MEDICAL ADVICE (OUTPATIENT)
Dept: GASTROENTEROLOGY | Facility: CLINIC | Age: 38
End: 2023-08-31
Payer: COMMERCIAL

## 2023-08-31 ENCOUNTER — TELEPHONE (OUTPATIENT)
Dept: GASTROENTEROLOGY | Facility: CLINIC | Age: 38
End: 2023-08-31

## 2023-08-31 DIAGNOSIS — K50.811 CROHN'S DISEASE OF BOTH SMALL AND LARGE INTESTINE WITH RECTAL BLEEDING (H): Primary | ICD-10-CM

## 2023-08-31 RX ORDER — ALBUTEROL SULFATE 0.83 MG/ML
2.5 SOLUTION RESPIRATORY (INHALATION)
Status: CANCELLED | OUTPATIENT
Start: 2023-08-31

## 2023-08-31 RX ORDER — PREDNISONE 5 MG/1
30 TABLET ORAL DAILY
Qty: 180 TABLET | Refills: 0 | Status: SHIPPED | OUTPATIENT
Start: 2023-08-31 | End: 2023-10-04

## 2023-08-31 RX ORDER — METHYLPREDNISOLONE SODIUM SUCCINATE 125 MG/2ML
125 INJECTION, POWDER, LYOPHILIZED, FOR SOLUTION INTRAMUSCULAR; INTRAVENOUS
Status: CANCELLED
Start: 2023-08-31

## 2023-08-31 RX ORDER — ALBUTEROL SULFATE 90 UG/1
1-2 AEROSOL, METERED RESPIRATORY (INHALATION)
Status: CANCELLED
Start: 2023-08-31

## 2023-08-31 RX ORDER — DIPHENHYDRAMINE HYDROCHLORIDE 50 MG/ML
50 INJECTION INTRAMUSCULAR; INTRAVENOUS
Status: CANCELLED
Start: 2023-08-31

## 2023-08-31 RX ORDER — MEPERIDINE HYDROCHLORIDE 25 MG/ML
25 INJECTION INTRAMUSCULAR; INTRAVENOUS; SUBCUTANEOUS EVERY 30 MIN PRN
Status: CANCELLED | OUTPATIENT
Start: 2023-08-31

## 2023-08-31 RX ORDER — EPINEPHRINE 1 MG/ML
0.3 INJECTION, SOLUTION, CONCENTRATE INTRAVENOUS EVERY 5 MIN PRN
Status: CANCELLED | OUTPATIENT
Start: 2023-08-31

## 2023-08-31 NOTE — TELEPHONE ENCOUNTER
Refill sent of 30mg daily prednisone. Patient will remain on this dose until Skyrizi therapy is started.

## 2023-08-31 NOTE — TELEPHONE ENCOUNTER
-- Therapy plan initiated for Diane  -- PA started for infusions at [facility] + PA for OBI injections  -- Pre-biologic labs to be completed 9/1; orders in place  -- MTM referral in place  ________________________________________________  Per clinic visit w/ Dr. Ivey 8/30/23:

## 2023-08-31 NOTE — TELEPHONE ENCOUNTER
PA Initiation    Medication: SKYRIZI 360 MG/2.4ML SC SOCT  Insurance Company: [employer] - Phone [phone] Fax [phone]  Pharmacy Filling the Rx:    Filling Pharmacy Phone:    Filling Pharmacy Fax:    Start Date: 8/31/2023  [id number]

## 2023-09-01 ENCOUNTER — LAB (OUTPATIENT)
Dept: LAB | Facility: CLINIC | Age: 38
End: 2023-09-01
Payer: COMMERCIAL

## 2023-09-01 DIAGNOSIS — K50.811 CROHN'S DISEASE OF BOTH SMALL AND LARGE INTESTINE WITH RECTAL BLEEDING (H): ICD-10-CM

## 2023-09-01 DIAGNOSIS — Z51.81 ENCOUNTER FOR MEDICATION MONITORING: ICD-10-CM

## 2023-09-01 LAB
ALBUMIN SERPL BCG-MCNC: 3.9 G/DL (ref 3.5–5.2)
ALP SERPL-CCNC: 67 U/L (ref 35–104)
ALT SERPL W P-5'-P-CCNC: 30 U/L (ref 0–50)
ANION GAP SERPL CALCULATED.3IONS-SCNC: 15 MMOL/L (ref 7–15)
AST SERPL W P-5'-P-CCNC: 21 U/L (ref 0–45)
BILIRUB SERPL-MCNC: 0.2 MG/DL
BUN SERPL-MCNC: 11.4 MG/DL (ref 6–20)
CALCIUM SERPL-MCNC: 9.1 MG/DL (ref 8.6–10)
CHLORIDE SERPL-SCNC: 103 MMOL/L (ref 98–107)
CREAT SERPL-MCNC: 0.82 MG/DL (ref 0.51–0.95)
DEPRECATED HCO3 PLAS-SCNC: 23 MMOL/L (ref 22–29)
GFR SERPL CREATININE-BSD FRML MDRD: >90 ML/MIN/1.73M2
GLUCOSE SERPL-MCNC: 95 MG/DL (ref 70–99)
POTASSIUM SERPL-SCNC: 3.7 MMOL/L (ref 3.4–5.3)
PROT SERPL-MCNC: 7.5 G/DL (ref 6.4–8.3)
SODIUM SERPL-SCNC: 141 MMOL/L (ref 136–145)

## 2023-09-01 PROCEDURE — 87389 HIV-1 AG W/HIV-1&-2 AB AG IA: CPT

## 2023-09-01 PROCEDURE — 86787 VARICELLA-ZOSTER ANTIBODY: CPT

## 2023-09-01 PROCEDURE — 36415 COLL VENOUS BLD VENIPUNCTURE: CPT

## 2023-09-01 PROCEDURE — 86663 EPSTEIN-BARR ANTIBODY: CPT

## 2023-09-01 PROCEDURE — 86664 EPSTEIN-BARR NUCLEAR ANTIGEN: CPT

## 2023-09-01 PROCEDURE — 86706 HEP B SURFACE ANTIBODY: CPT

## 2023-09-01 PROCEDURE — 87340 HEPATITIS B SURFACE AG IA: CPT

## 2023-09-01 PROCEDURE — 86644 CMV ANTIBODY: CPT

## 2023-09-01 PROCEDURE — 80053 COMPREHEN METABOLIC PANEL: CPT

## 2023-09-01 PROCEDURE — 86481 TB AG RESPONSE T-CELL SUSP: CPT

## 2023-09-01 PROCEDURE — 86803 HEPATITIS C AB TEST: CPT

## 2023-09-01 PROCEDURE — 86665 EPSTEIN-BARR CAPSID VCA: CPT

## 2023-09-01 PROCEDURE — 86704 HEP B CORE ANTIBODY TOTAL: CPT

## 2023-09-01 NOTE — TELEPHONE ENCOUNTER
Prior Authorization Approval    Medication: SKYRIZI 360 MG/2.4ML SC SOCT  Authorization Effective Date: 9/1/2023  Authorization Expiration Date: 9/1/2024  Approved Dose/Quantity: 2.4/56  Reference #: [id number]   Insurance Company: [employer] - Phone [phone] Fax [phone]  Expected CoPay:     [facility]  CoPay Card Available:      Financial Assistance Needed:    Which Pharmacy is filling the prescription: [facility]  Pharmacy Notified:    Patient Notified:

## 2023-09-02 LAB
HBV CORE AB SERPL QL IA: NONREACTIVE
HBV SURFACE AB SERPL IA-ACNC: 73.28 M[IU]/ML
HBV SURFACE AB SERPL IA-ACNC: REACTIVE M[IU]/ML
HBV SURFACE AG SERPL QL IA: NONREACTIVE
HCV AB SERPL QL IA: NONREACTIVE
HIV 1+2 AB+HIV1 P24 AG SERPL QL IA: NONREACTIVE

## 2023-09-03 LAB
GAMMA INTERFERON BACKGROUND BLD IA-ACNC: 0.03 IU/ML
M TB IFN-G BLD-IMP: NEGATIVE
M TB IFN-G CD4+ BCKGRND COR BLD-ACNC: 9.97 IU/ML
MITOGEN IGNF BCKGRD COR BLD-ACNC: 0.22 IU/ML
MITOGEN IGNF BCKGRD COR BLD-ACNC: 0.26 IU/ML
QUANTIFERON MITOGEN: 10 IU/ML
QUANTIFERON NIL TUBE: 0.03 IU/ML
QUANTIFERON TB1 TUBE: 0.29 IU/ML
QUANTIFERON TB2 TUBE: 0.25

## 2023-09-04 LAB
CMV IGG SERPL IA-ACNC: <0.2 U/ML
CMV IGG SERPL IA-ACNC: NORMAL
VZV IGG SER QL IA: 263.8 INDEX
VZV IGG SER QL IA: POSITIVE

## 2023-09-05 LAB
EBV EA-D IGG SER-ACNC: 14 U/ML (ref 0–9)
EBV EA-D IGG SER-ACNC: POSITIVE
EBV NA IGG SER IA-ACNC: 491 U/ML
EBV NA IGG SER IA-ACNC: POSITIVE [IU]/ML
EBV VCA IGG SER IA-ACNC: 168 U/ML
EBV VCA IGG SER IA-ACNC: POSITIVE

## 2023-09-13 ENCOUNTER — MYC MEDICAL ADVICE (OUTPATIENT)
Dept: FAMILY MEDICINE | Facility: CLINIC | Age: 38
End: 2023-09-13
Payer: COMMERCIAL

## 2023-09-13 DIAGNOSIS — J45.30 MILD PERSISTENT ASTHMA WITHOUT COMPLICATION: ICD-10-CM

## 2023-09-15 ENCOUNTER — VIRTUAL VISIT (OUTPATIENT)
Dept: GASTROENTEROLOGY | Facility: CLINIC | Age: 38
End: 2023-09-15
Attending: INTERNAL MEDICINE
Payer: COMMERCIAL

## 2023-09-15 DIAGNOSIS — T78.40XD ALLERGY, SUBSEQUENT ENCOUNTER: ICD-10-CM

## 2023-09-15 DIAGNOSIS — F41.9 ANXIETY AND DEPRESSION: ICD-10-CM

## 2023-09-15 DIAGNOSIS — G43.709 CHRONIC MIGRAINE WITHOUT AURA WITHOUT STATUS MIGRAINOSUS, NOT INTRACTABLE: ICD-10-CM

## 2023-09-15 DIAGNOSIS — J45.30 MILD PERSISTENT ASTHMA WITHOUT COMPLICATION: ICD-10-CM

## 2023-09-15 DIAGNOSIS — F32.A ANXIETY AND DEPRESSION: ICD-10-CM

## 2023-09-15 DIAGNOSIS — Z30.41 SURVEILLANCE OF PREVIOUSLY PRESCRIBED CONTRACEPTIVE PILL: ICD-10-CM

## 2023-09-15 DIAGNOSIS — K50.811 CROHN'S DISEASE OF BOTH SMALL AND LARGE INTESTINE WITH RECTAL BLEEDING (H): Primary | ICD-10-CM

## 2023-09-15 RX ORDER — MULTIPLE VITAMINS W/ MINERALS TAB 9MG-400MCG
1 TAB ORAL DAILY
COMMUNITY

## 2023-09-15 NOTE — PROGRESS NOTES
Medication Therapy Management (MTM) Encounter    ASSESSMENT:                            Medication Adherence/Access: No issues identified    Crohn's: Agree with plan to start Skyrizi given recent diagnosis of moderate/severe Crohn's disease. Discussed in depth today as noted below. Will plan for dermatology referral for baseline skin check given IBD diagnosis and plan for immunosuppression. Discussed recommendation for Prevnar-20 and Shingrix, which are indicated given biologic immunosuppression. Encouraged seasonal influenza and updated COVID-19 booster when available. She may also get vaccinated for hepatitis A if interested.     During our discussion, described evidence surrounding potential for Skyrizi to elevate cholesterol. The  does not specifically recommend this has to be monitored, however, they report this out as a potential adverse event. Actual increases appear modest, but it is reasonable to check in her case.    Headaches: Unchanged.    Anxiety and Depression: Unchanged.    Allergies/Asthma: Stable based on report.    Contraception: Stable based on report.    PLAN:                            Will place dermatology referral for skin check  Valorie to consider the following vaccines (when on < 20 mg prednisone daily):  Prevnar-20  Shingrix series  Seasonal influenza  COVID-19 booster (when available)  Hepatitis A (if desired)    Will plan on checking cholesterol per your preference (with first infusion and at week 12).    Follow-up:    -- 4-6 weeks for check-in   -- 3 mo for MTM visit (will order OBI + lipid panel then)    EDUCATION:                            We reviewed Skyrizi today including an overview of the coverage and pharmacy process, mechanism of action, general dosing/administration, side effects (both common/serious), precautions, monitoring for safety and efficacy, as well as time to efficacy. We discussed immunosuppressive precautions including caution with LIVE vaccines unless  "specifically indicated, and recommendation for indicated non-live/inactivated vaccines. Reviewed potential need to hold medication in the setting of signs/symptoms of infection. Contact information provided in the event she has questions/concerns about the medication.    SUBJECTIVE/OBJECTIVE:                          Valorie Treviño is a 38 year old female called for an initial visit. She was referred to me from Dr. Ivey.      Reason for visit: Skyrizi education + health maintenance review     Allergies/ADRs: Reviewed in chart  Tobacco: She reports that she has never smoked. She has never used smokeless tobacco.  Alcohol: not currently using    Medication Adherence/Access: no issues reported    Crohn's:   Prednisone 30 mg daily  Gummy MVI daily  Famotidine 20 mg daily     Valorie was recently diagnosed with moderate-severe Crohn's disease. She met with [facility] and [facility], with plan to start Skyrizi therapy. She is scheduled to start Skyrizi 9/18. Infusion loading doses and maintenance therapy are currently authorized.    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    -- Influenza (every year) recommend yearly  -- TdaP (every 10 years) last 5/5/2017  -- Pneumococcal Pneumonia    Prevnar-13: not on file    Pneumovax-23: 7/24/2020   Prevnar-20: not on file   -- COVID-19 4/8/2021, 11/28/2021 and 9/23/2022 (bivalent)  -- Yearly assessment for latent Tb (verbal screening and exam, PPD or QuantiFERON-Tb testing)      - negative 9/2023    One time confirmation of immunity or serologies:  -- Hepatitis A (serologies or immunizations) not on file   -- Hepatitis B (serologies or immunizations) serologies 2023 indicate immunity  -- Varicella/Zoster    Varicella immune per serologies, did have chickenpox   Zoster not on " file     Due to the immunosuppression in this patient, I would not advise administration of live vaccines such as varicella/VZV, intranasal influenza, MMR, or yellow fever vaccine (if traveling).      Pre-Biologic Screening:  -- Hep B Surface Antibody serologies indicate immunity  -- Hep B Surface Antigen non-reactive  -- Hep B Core Antibody non-reactive  -- Hep C Antibody non-reactive   - completed 9/1/2023    Cancer Screening:    Cervical cancer screening: Per OBGYN   -- due in October     Skin cancer screening: Annual visual exam of skin by dermatologist since patient is immunocompromised    Misc:  -- Avoid tobacco use  -- Avoid NSAIDs as there is potentially a 25% chance of causing an IBD flare    Headaches:  Rimegepant 75 mg ODT (not using)  Propranolol ER 60 mg daily     Not filling nurtec right now. Propranolol is doing okay.    Anxiety and Depression:   Citalopram 20 mg daily    No reported side effects or concerns.        5/27/2022     4:16 PM 4/26/2023     4:18 PM 7/11/2023     4:50 PM   PHQ   PHQ-9 Total Score 9 10 10   Q9: Thoughts of better off dead/self-harm past 2 weeks Not at all Not at all Not at all      Allergies/Asthma:  Xopenex HFA as needed   Flonase as needed  Cetirizine 10 mg daily    No reported side effects or concerns.    Contraception:  Ortho-novum daily    No reported concerns.  ----------------    I spent 55 minutes with this patient today. All changes were made via collaborative practice agreement with Aiyana Ivey. A copy of the visit note was provided to the patient's provider(s).    A summary of these recommendations was sent via [employer].    Crista Fuentes PharmD, BCACP  MTM Pharmacist   [facility]   Phone: [phone]    Telemedicine Visit Details  Type of service:  Telephone visit  Start Time:  3:02 PM  End Time: 3:57 PM     Medication Therapy Recommendations  Crohn's disease of both small and large intestine with rectal bleeding (H)    Rationale:  Preventive therapy - Needs additional medication therapy - Indication   Recommendation: Start Medication - Prevnar 20 0.5 ML Brigitte   Status: Accepted per CPA   Note: Recommend Prevnar-20, Shingrix series, seasonal influenza, COVID-19 booster, and hepatitis A vaccine (if interested).

## 2023-09-15 NOTE — Clinical Note
"    9/15/2023         RE: Lou Treviño  [address]  [place] [address]        Dear Colleague,    Thank you for referring your patient, Lou Treviño, to the [facility]. Please see a copy of my visit note below.    Medication Therapy Management (MTM) Encounter    ASSESSMENT:                            Medication Adherence/Access: {adherencechoices:079397}    ***:   ***    PLAN:                            Will place dermatology referral for skin check  Valorie to consider the following vaccines (when on < 20 mg prednisone daily):  Prevnar-20  Shingrix series  Seasonal influenza  COVID-19 booster (when available)  Will plan on checking cholesterol per your preference (with first infusion and at week 12).    Follow-up: {followuptest2:231824}    SUBJECTIVE/OBJECTIVE:                          Valorie Treviño is a 38 year old female called for an initial visit. She was referred to me from Dr. Ivey. {mtmvisitdetails:826920}     Reason for visit: Skyrizi education + health maintenance review     Allergies/ADRs: Reviewed in chart  Tobacco: She reports that she has never smoked. She has never used smokeless tobacco.  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    -- Influenza (every year)  -- TdaP (every 10 years)  -- Pneumococcal Pneumonia    Prevnar-13:   Pneumovax-23: 7/24/2020   Prevnar-20:  -- COVID-19  -- Yearly assessment for latent Tb (verbal screening and exam, PPD or QuantiFERON-Tb testing)      {result:481622:::1}    One time confirmation of immunity or serologies:  -- " Hepatitis A (serologies or immunizations)  -- Hepatitis B (serologies or immunizations)  -- Varicella/Zoster    Varicella immune per serologies, did have    Zoster    Due to the immunosuppression in this patient, I would not advise administration of live vaccines such as varicella/VZV, intranasal influenza, MMR, or yellow fever vaccine (if traveling).      Pre-Biologic Screening:  -- Hep B Surface Antibody {ResultsSerologies:743605}  -- Hep B Surface Antigen {ResultsSerologies:036700}  -- Hep B Core Antibody {ResultsSerologies:748883}  -- Hep C Antibody {ResultsSerologies:709410}    Bone mineral density screening   -- Recommend all patients supplement with calcium and vitamin D  -- ***Given prior steroid use recommend DEXA if not already done    Cancer Screening:  Colon cancer screening:  Given ***, recommend patient undergo regular dysplasia surveillance   Next dysplasia screening is recommended ***.    Cervical cancer screening: Per OBGYN   -- due in October     Skin cancer screening: Annual visual exam of skin by dermatologist since patient is immunocompromised    Misc:  -- Avoid tobacco use  -- Avoid NSAIDs as there is potentially a 25% chance of causing an IBD flare    :    Not filling nurtec right now. Propranolol is doing okay.    :  Flonase as needed  Cetirizine 10 mg daily    ----------------  {PATRICK?:024165}    I spent 55 minutes with this patient today. All changes were made via collaborative practice agreement with Aiyana Ivey. A copy of the visit note was provided to the patient's provider(s).    A summary of these recommendations was sent via [employer].    Crista HerronD, BCACP  MTM Pharmacist   [facility]   Phone: [phone]    Telemedicine Visit Details  Type of service:  Telephone visit  Start Time:  3:02 PM  End Time: 3:57 PM       Medication Therapy Recommendations  No medication therapy recommendations to display       Again, thank you for allowing me to  participate in the care of your patient.        Sincerely,        Crista Fuentes RP

## 2023-09-18 ENCOUNTER — INFUSION THERAPY VISIT (OUTPATIENT)
Dept: INFUSION THERAPY | Facility: CLINIC | Age: 38
End: 2023-09-18
Attending: INTERNAL MEDICINE
Payer: COMMERCIAL

## 2023-09-18 VITALS
BODY MASS INDEX: 39.8 KG/M2 | HEART RATE: 82 BPM | DIASTOLIC BLOOD PRESSURE: 74 MMHG | SYSTOLIC BLOOD PRESSURE: 109 MMHG | OXYGEN SATURATION: 97 % | RESPIRATION RATE: 16 BRPM | TEMPERATURE: 98.1 F | WEIGHT: 265.6 LBS

## 2023-09-18 DIAGNOSIS — K50.811 CROHN'S DISEASE OF BOTH SMALL AND LARGE INTESTINE WITH RECTAL BLEEDING (H): Primary | ICD-10-CM

## 2023-09-18 LAB
ALBUMIN SERPL BCG-MCNC: 3.9 G/DL (ref 3.5–5.2)
ALP SERPL-CCNC: 59 U/L (ref 35–104)
ALT SERPL W P-5'-P-CCNC: 27 U/L (ref 0–50)
ANION GAP SERPL CALCULATED.3IONS-SCNC: 14 MMOL/L (ref 7–15)
AST SERPL W P-5'-P-CCNC: 16 U/L (ref 0–45)
BASOPHILS # BLD AUTO: 0 10E3/UL (ref 0–0.2)
BASOPHILS NFR BLD AUTO: 0 %
BILIRUB DIRECT SERPL-MCNC: <0.2 MG/DL (ref 0–0.3)
BILIRUB SERPL-MCNC: 0.4 MG/DL
BUN SERPL-MCNC: 11.8 MG/DL (ref 6–20)
CALCIUM SERPL-MCNC: 8.6 MG/DL (ref 8.6–10)
CHLORIDE SERPL-SCNC: 104 MMOL/L (ref 98–107)
CHOLEST SERPL-MCNC: 217 MG/DL
CREAT SERPL-MCNC: 0.68 MG/DL (ref 0.51–0.95)
CRP SERPL-MCNC: 6.69 MG/L
DEPRECATED HCO3 PLAS-SCNC: 20 MMOL/L (ref 22–29)
EGFRCR SERPLBLD CKD-EPI 2021: >90 ML/MIN/1.73M2
EOSINOPHIL # BLD AUTO: 0 10E3/UL (ref 0–0.7)
EOSINOPHIL NFR BLD AUTO: 0 %
ERYTHROCYTE [DISTWIDTH] IN BLOOD BY AUTOMATED COUNT: 15 % (ref 10–15)
ERYTHROCYTE [SEDIMENTATION RATE] IN BLOOD BY WESTERGREN METHOD: 15 MM/HR (ref 0–20)
GLUCOSE SERPL-MCNC: 170 MG/DL (ref 70–99)
HCT VFR BLD AUTO: 41.1 % (ref 35–47)
HDLC SERPL-MCNC: 50 MG/DL
HGB BLD-MCNC: 13.4 G/DL (ref 11.7–15.7)
IMM GRANULOCYTES # BLD: 0.1 10E3/UL
IMM GRANULOCYTES NFR BLD: 1 %
LDLC SERPL CALC-MCNC: 135 MG/DL
LYMPHOCYTES # BLD AUTO: 2.2 10E3/UL (ref 0.8–5.3)
LYMPHOCYTES NFR BLD AUTO: 19 %
MCH RBC QN AUTO: 27.4 PG (ref 26.5–33)
MCHC RBC AUTO-ENTMCNC: 32.6 G/DL (ref 31.5–36.5)
MCV RBC AUTO: 84 FL (ref 78–100)
MONOCYTES # BLD AUTO: 0.5 10E3/UL (ref 0–1.3)
MONOCYTES NFR BLD AUTO: 4 %
NEUTROPHILS # BLD AUTO: 9 10E3/UL (ref 1.6–8.3)
NEUTROPHILS NFR BLD AUTO: 76 %
NONHDLC SERPL-MCNC: 167 MG/DL
NRBC # BLD AUTO: 0 10E3/UL
NRBC BLD AUTO-RTO: 0 /100
PLATELET # BLD AUTO: 319 10E3/UL (ref 150–450)
POTASSIUM SERPL-SCNC: 3.5 MMOL/L (ref 3.4–5.3)
PROT SERPL-MCNC: 7.4 G/DL (ref 6.4–8.3)
RBC # BLD AUTO: 4.89 10E6/UL (ref 3.8–5.2)
SODIUM SERPL-SCNC: 138 MMOL/L (ref 136–145)
TRIGL SERPL-MCNC: 160 MG/DL
WBC # BLD AUTO: 11.8 10E3/UL (ref 4–11)

## 2023-09-18 PROCEDURE — 85652 RBC SED RATE AUTOMATED: CPT | Performed by: INTERNAL MEDICINE

## 2023-09-18 PROCEDURE — 86140 C-REACTIVE PROTEIN: CPT | Performed by: INTERNAL MEDICINE

## 2023-09-18 PROCEDURE — 36415 COLL VENOUS BLD VENIPUNCTURE: CPT | Performed by: INTERNAL MEDICINE

## 2023-09-18 PROCEDURE — 85025 COMPLETE CBC W/AUTO DIFF WBC: CPT | Performed by: INTERNAL MEDICINE

## 2023-09-18 PROCEDURE — 80061 LIPID PANEL: CPT | Performed by: INTERNAL MEDICINE

## 2023-09-18 PROCEDURE — 96365 THER/PROPH/DIAG IV INF INIT: CPT

## 2023-09-18 PROCEDURE — 250N000011 HC RX IP 250 OP 636: Performed by: INTERNAL MEDICINE

## 2023-09-18 PROCEDURE — 258N000003 HC RX IP 258 OP 636: Performed by: INTERNAL MEDICINE

## 2023-09-18 PROCEDURE — 82248 BILIRUBIN DIRECT: CPT | Performed by: INTERNAL MEDICINE

## 2023-09-18 RX ORDER — EPINEPHRINE 1 MG/ML
0.3 INJECTION, SOLUTION INTRAMUSCULAR; SUBCUTANEOUS EVERY 5 MIN PRN
Status: CANCELLED | OUTPATIENT
Start: 2023-10-16

## 2023-09-18 RX ORDER — METHYLPREDNISOLONE SODIUM SUCCINATE 125 MG/2ML
125 INJECTION, POWDER, LYOPHILIZED, FOR SOLUTION INTRAMUSCULAR; INTRAVENOUS
Status: CANCELLED
Start: 2023-10-16

## 2023-09-18 RX ORDER — ALBUTEROL SULFATE 0.83 MG/ML
2.5 SOLUTION RESPIRATORY (INHALATION)
Status: CANCELLED | OUTPATIENT
Start: 2023-10-16

## 2023-09-18 RX ORDER — MEPERIDINE HYDROCHLORIDE 25 MG/ML
25 INJECTION INTRAMUSCULAR; INTRAVENOUS; SUBCUTANEOUS EVERY 30 MIN PRN
Status: CANCELLED | OUTPATIENT
Start: 2023-10-16

## 2023-09-18 RX ORDER — DIPHENHYDRAMINE HYDROCHLORIDE 50 MG/ML
50 INJECTION INTRAMUSCULAR; INTRAVENOUS
Status: CANCELLED
Start: 2023-10-16

## 2023-09-18 RX ORDER — LEVALBUTEROL TARTRATE 45 UG/1
1-2 AEROSOL, METERED ORAL EVERY 6 HOURS PRN
Qty: 15 G | Refills: 1 | Status: SHIPPED | OUTPATIENT
Start: 2023-09-18 | End: 2024-08-06

## 2023-09-18 RX ORDER — ALBUTEROL SULFATE 90 UG/1
1-2 AEROSOL, METERED RESPIRATORY (INHALATION)
Status: CANCELLED
Start: 2023-10-16

## 2023-09-18 RX ADMIN — DEXTROSE MONOHYDRATE 250 ML: 50 INJECTION, SOLUTION INTRAVENOUS at 09:38

## 2023-09-18 RX ADMIN — DEXTROSE MONOHYDRATE 600 MG: 50 INJECTION, SOLUTION INTRAVENOUS at 08:37

## 2023-09-18 ASSESSMENT — PAIN SCALES - GENERAL: PAINLEVEL: NO PAIN (1)

## 2023-09-18 NOTE — PROGRESS NOTES
Infusion Nursing Note:  Lou Treviño presents today for Diane    Patient seen by provider today: No   present during visit today: Not Applicable.    Note: N/A.      Intravenous Access:  Peripheral IV placed.    Treatment Conditions:  Biological Infusion Checklist:  ~~~ NOTE: If the patient answers yes to any of the questions below, hold the infusion and contact ordering provider or on-call provider.    Have you recently had an elevated temperature, fever, chills, productive cough, coughing for 3 weeks or longer or hemoptysis, abnormal vital signs, night sweats,  chest pain or have you noticed a decrease in your appetite, unexplained weight loss or fatigue? No - having night sweats as a side effect of the prednisone.  Do you have any open wounds or new incisions? No  Do you have any upcoming hospitalizations or surgeries? Does not include esophagogastroduodenoscopy, colonoscopy, endoscopic retrograde cholangiopancreatography (ERCP), endoscopic ultrasound (EUS), dental procedures or joint aspiration/steroid injections No  Do you currently have any signs of illness or infection or are you on any antibiotics? No  Have you had any new, sudden or worsening abdominal pain? No  Have you or anyone in your household received a live vaccination in the past 4 weeks? Please note: No live vaccines while on biologic/chemotherapy until 6 months after the last treatment. Patient can receive the flu vaccine (shot only), pneumovax and the Covid vaccine. It is optimal for the patient to get these vaccines mid cycle, but they can be given at any time as long as it is not on the day of the infusion. No  Have you recently been diagnosed with any new nervous system diseases (ie. Multiple sclerosis, Guillain [place], seizures, neurological changes) or cancer diagnosis? Are you on any form of radiation or chemotherapy? No  Are you pregnant or breast feeding or do you have plans of pregnancy in the future? No  Have you been  having any signs of worsening depression or suicidal ideations?  (benlysta only) NA  Have there been any other new onset medical symptoms? No    Post Infusion Assessment:  Patient tolerated infusion without incident.  Site patent and intact, free from redness, edema or discomfort.  Access discontinued per protocol.     Discharge Plan:   Patient and/or family verbalized understanding of discharge instructions and all questions answered.  Patient discharged in stable condition accompanied by: self.    Administrations This Visit       dextrose 5% BOLUS 250 mL       Admin Date  09/18/2023 Action  $New Bag Dose  250 mL Route  Intravenous Administered By  Annabel Bruner RN              risankizumab-rzaa (SKYRIZI) 600 mg in D5W 285 mL infusion       Admin Date  09/18/2023 Action  $New Bag Dose  600 mg Rate  285 mL/hr Route  Intravenous Administered By  Annabel Bruner RN Alyssa Sakhitab-Kerestes, RN

## 2023-09-18 NOTE — PATIENT INSTRUCTIONS
Dear Lou Treviño    Thank you for choosing [facility] ([facility]) for your infusion.  The following information is a summary of our appointment as well as important reminders.        We look forward in seeing you on your next appointment here at [facility] ([facility]).  Please don t hesitate to call us at [phone] to reschedule any of your appointments or to speak with one of the [facility] registered nurses.  It was a pleasure taking care of you today.    Sincerely,    [facility]  [address] [place] [address]  [address]  Phone:  [phone]

## 2023-09-21 NOTE — PATIENT INSTRUCTIONS
"Recommendations from today's MTM visit:                                                      Will place dermatology referral for skin check  Valorie to consider the following vaccines (when on < 20 mg prednisone daily):  Prevnar-20  Shingrix series  Seasonal influenza  COVID-19 booster (when available)  Hepatitis A (if desired)    Will plan on checking cholesterol per your preference (with first infusion and at week 12).    Follow-up:    -- 4-6 weeks for check-in   -- 3 mo for MTM visit (will order OBI + lipid panel then)    It was great speaking with you today.  I value your experience and would be very thankful for your time in providing feedback in our clinic survey. In the next few days, you may receive an email or text message from [employer] with a link to a survey related to your  clinical pharmacist.\"     To schedule another MTM appointment, please call the clinic directly or you may call the MTM scheduling line at [phone] or toll-free at [phone].     My Clinical Pharmacist's contact information:                                                      Please feel free to contact me with any questions or concerns you have.      Crista HerronD, BCACP  MTM Pharmacist   [facility]   Phone: [phone]    "

## 2023-09-26 ENCOUNTER — MYC MEDICAL ADVICE (OUTPATIENT)
Dept: GASTROENTEROLOGY | Facility: CLINIC | Age: 38
End: 2023-09-26
Payer: COMMERCIAL

## 2023-09-28 ENCOUNTER — TELEPHONE (OUTPATIENT)
Dept: GASTROENTEROLOGY | Facility: CLINIC | Age: 38
End: 2023-09-28
Payer: COMMERCIAL

## 2023-09-28 NOTE — TELEPHONE ENCOUNTER
[facility] Call Center    Phone Message    May a detailed message be left on voicemail: yes     Reason for Call: Wong from [facility] called in regards to pt's hydrocortisone (CORTENEMA) 100 MG/60ML enema. He is needing clarification. Please call him back at .    Action Taken: Other: csc gi    Travel Screening: Not Applicable

## 2023-10-02 NOTE — TELEPHONE ENCOUNTER
Called [facility] to clarify order. The pharmacy will fill the prescription for the patient today.

## 2023-10-09 NOTE — TELEPHONE ENCOUNTER
Patient states enema prescription is still not available for pick-up. Called [facility] pharmacy. Order clarification from last week was not resolved on their end. Pharmacy will order in the hydrocortisone enemas and they will be available within the next 1-2 days for the patient.

## 2023-10-16 ENCOUNTER — INFUSION THERAPY VISIT (OUTPATIENT)
Dept: INFUSION THERAPY | Facility: CLINIC | Age: 38
End: 2023-10-16
Attending: INTERNAL MEDICINE
Payer: COMMERCIAL

## 2023-10-16 VITALS
TEMPERATURE: 98.4 F | SYSTOLIC BLOOD PRESSURE: 128 MMHG | DIASTOLIC BLOOD PRESSURE: 85 MMHG | OXYGEN SATURATION: 99 % | HEART RATE: 69 BPM | RESPIRATION RATE: 18 BRPM

## 2023-10-16 DIAGNOSIS — K50.811 CROHN'S DISEASE OF BOTH SMALL AND LARGE INTESTINE WITH RECTAL BLEEDING (H): Primary | ICD-10-CM

## 2023-10-16 LAB
ALBUMIN SERPL BCG-MCNC: 3.7 G/DL (ref 3.5–5.2)
ALP SERPL-CCNC: 48 U/L (ref 35–104)
ALT SERPL W P-5'-P-CCNC: 22 U/L (ref 0–50)
AST SERPL W P-5'-P-CCNC: 11 U/L (ref 0–45)
BILIRUB DIRECT SERPL-MCNC: <0.2 MG/DL (ref 0–0.3)
BILIRUB SERPL-MCNC: 0.2 MG/DL
CHOLEST SERPL-MCNC: 215 MG/DL
HDLC SERPL-MCNC: 46 MG/DL
LDLC SERPL CALC-MCNC: 139 MG/DL
MAGNESIUM SERPL-MCNC: 1.9 MG/DL (ref 1.7–2.3)
NONHDLC SERPL-MCNC: 169 MG/DL
PROT SERPL-MCNC: 6.9 G/DL (ref 6.4–8.3)
TRIGL SERPL-MCNC: 152 MG/DL

## 2023-10-16 PROCEDURE — 96365 THER/PROPH/DIAG IV INF INIT: CPT

## 2023-10-16 PROCEDURE — 83735 ASSAY OF MAGNESIUM: CPT

## 2023-10-16 PROCEDURE — 36415 COLL VENOUS BLD VENIPUNCTURE: CPT | Performed by: INTERNAL MEDICINE

## 2023-10-16 PROCEDURE — 80061 LIPID PANEL: CPT | Performed by: INTERNAL MEDICINE

## 2023-10-16 PROCEDURE — 250N000011 HC RX IP 250 OP 636: Performed by: INTERNAL MEDICINE

## 2023-10-16 PROCEDURE — 258N000003 HC RX IP 258 OP 636: Performed by: INTERNAL MEDICINE

## 2023-10-16 PROCEDURE — 82040 ASSAY OF SERUM ALBUMIN: CPT | Performed by: INTERNAL MEDICINE

## 2023-10-16 RX ORDER — MEPERIDINE HYDROCHLORIDE 25 MG/ML
25 INJECTION INTRAMUSCULAR; INTRAVENOUS; SUBCUTANEOUS EVERY 30 MIN PRN
Status: CANCELLED | OUTPATIENT
Start: 2023-11-13

## 2023-10-16 RX ORDER — METHYLPREDNISOLONE SODIUM SUCCINATE 125 MG/2ML
125 INJECTION, POWDER, LYOPHILIZED, FOR SOLUTION INTRAMUSCULAR; INTRAVENOUS
Status: CANCELLED
Start: 2023-11-13

## 2023-10-16 RX ORDER — DIPHENHYDRAMINE HYDROCHLORIDE 50 MG/ML
50 INJECTION INTRAMUSCULAR; INTRAVENOUS
Status: CANCELLED
Start: 2023-11-13

## 2023-10-16 RX ORDER — ALBUTEROL SULFATE 90 UG/1
1-2 AEROSOL, METERED RESPIRATORY (INHALATION)
Status: CANCELLED
Start: 2023-11-13

## 2023-10-16 RX ORDER — ALBUTEROL SULFATE 0.83 MG/ML
2.5 SOLUTION RESPIRATORY (INHALATION)
Status: CANCELLED | OUTPATIENT
Start: 2023-11-13

## 2023-10-16 RX ORDER — EPINEPHRINE 1 MG/ML
0.3 INJECTION, SOLUTION INTRAMUSCULAR; SUBCUTANEOUS EVERY 5 MIN PRN
Status: CANCELLED | OUTPATIENT
Start: 2023-11-13

## 2023-10-16 RX ADMIN — DEXTROSE MONOHYDRATE 600 MG: 50 INJECTION, SOLUTION INTRAVENOUS at 16:26

## 2023-10-16 RX ADMIN — DEXTROSE MONOHYDRATE 250 ML: 50 INJECTION, SOLUTION INTRAVENOUS at 17:27

## 2023-10-16 NOTE — PROGRESS NOTES
Per Crista Fuentes, [facility] - order placed for magnesium level to be drawn with labs at infusion appointment today.

## 2023-10-16 NOTE — PROGRESS NOTES
Nursing Note  Lou Treviño [facility] for:   Chief Complaint   Patient presents with    Infusion     Diane       During today's [facility] appointment, orders from Dr. Ivey were completed.  Frequency: Dose 2 today    Progress note:  Patient identification verified by name and date of birth.  Assessment completed.  Vitals recorded in Doc Flowsheets.  Patient was provided with education regarding medication/procedure and possible side effects.  Patient verbalized understanding.     present during visit today: Not Applicable.    Treatment Conditions: ~~~ NOTE: If the patient answers yes to any of the questions below, hold the infusion and contact ordering provider or on-call provider.    Have you recently had an elevated temperature, fever, chills, productive cough, coughing for 3 weeks or longer or hemoptysis,  abnormal vital signs, night sweats,  chest pain or have you noticed a decrease in your appetite, unexplained weight loss or fatigue? No  Do you have any open wounds or new incisions? No  Do you have any upcoming hospitalizations or surgeries? Does not include esophagogastroduodenoscopy, colonoscopy, endoscopic retrograde cholangiopancreatography (ERCP), endoscopic ultrasound (EUS), dental procedures or joint aspiration/steroid injections No  Do you currently have any signs of illness or infection or are you on any antibiotics? No  Have you had any new, sudden or worsening abdominal pain? No  Have you or anyone in your household received a live vaccination in the past 4 weeks? Please note: No live vaccines while on biologic/chemotherapy until 6 months after the last treatment. Patient can receive the flu vaccine (shot only), pneumovax and the Covid vaccine. It is optimal for the patient to get these vaccines mid cycle, but they can be given at any time as long as it is not on the day of the infusion. No  Have you  recently been diagnosed with any new nervous system diseases (ie. Multiple sclerosis, Guillain [place], seizures, neurological changes) or cancer diagnosis? Are you on any form of radiation or chemotherapy? No  Are you pregnant or breast feeding or do you have plans of pregnancy in the future? No  Have there been any other new onset medical symptoms? Yes, muscle cramping  and soreness since first Skyrizi infusion.    Premedications: not ordered    Drug Waste Record: No    Infusion length and rate:  infusion given over approximately  60 minutes    Labs: were drawn per orders.     Vascular access: peripheral IV placed today.    Is the next appt scheduled? yes    Post Infusion Assessment:  Patient tolerated infusion without incident.     Discharge Plan:   Follow up plan of care with: ongoing infusions at [facility]. and ordering provider as scheduled.  Discharge instructions were reviewed with patient.  Patient/representative verbalized understanding of discharge instructions and all questions answered.  Patient discharged from [facility] in stable condition.    Flores Maria RN    Administrations This Visit       dextrose 5% BOLUS 250 mL       Admin Date  10/16/2023 Action  $New Bag Dose  250 mL Route  Intravenous Documented By  Flores Maria RN              risankizumab-rzaa (SKYRIZI) 600 mg in D5W 285 mL infusion       Admin Date  10/16/2023 Action  $New Bag Dose  600 mg Rate  285 mL/hr Route  Intravenous Documented By  Flores Maria RN                    /84 (BP Location: Right arm, Patient Position: Semi-Vital's, Cuff Size: Adult Large)   Pulse 66   Temp 98.4  F (36.9  C) (Oral)   Resp 18   SpO2 99%

## 2023-10-31 ENCOUNTER — OFFICE VISIT (OUTPATIENT)
Dept: GASTROENTEROLOGY | Facility: CLINIC | Age: 38
End: 2023-10-31
Payer: COMMERCIAL

## 2023-10-31 VITALS
WEIGHT: 277.8 LBS | HEART RATE: 90 BPM | DIASTOLIC BLOOD PRESSURE: 94 MMHG | HEIGHT: 69 IN | BODY MASS INDEX: 41.15 KG/M2 | SYSTOLIC BLOOD PRESSURE: 116 MMHG | OXYGEN SATURATION: 99 %

## 2023-10-31 DIAGNOSIS — K50.818 CROHN'S DISEASE OF BOTH SMALL AND LARGE INTESTINE WITH OTHER COMPLICATION (H): Primary | ICD-10-CM

## 2023-10-31 DIAGNOSIS — F48.9 MENTAL HEALTH PROBLEM: ICD-10-CM

## 2023-10-31 PROCEDURE — 99215 OFFICE O/P EST HI 40 MIN: CPT | Performed by: INTERNAL MEDICINE

## 2023-10-31 ASSESSMENT — PAIN SCALES - GENERAL: PAINLEVEL: MILD PAIN (2)

## 2023-10-31 NOTE — PROGRESS NOTES
"Chief Complaint   Patient presents with    Follow Up       Vitals:    10/31/23 1531   BP: (!) 116/94   BP Location: Left arm   Patient Position: Sitting   Cuff Size: Adult Large   Pulse: 90   SpO2: 99%   Weight: 126 kg (277 lb 12.8 oz)   Height: 1.74 m (5' 8.5\")       Body mass index is 41.62 kg/m .    James Epps    "

## 2023-10-31 NOTE — PROGRESS NOTES
GASTROENTEROLOGY IBD  OUTPATIENT CLINIC CONSULT    DATE OF SERVICE: 10/31/2023  PROVIDER REQUESTING CONSULT: Referred Self  Reason for Consultation: New diagnosis of Crohn's disease     ASSESSMENT:  38-year-old female with prediabetes, elevated BMI, status post right hemicolectomy (for intussusception as an infant) with new diagnosis of Crohn's disease who presents to the clinic to establish care.      She underwent colonoscopy early August 2023 for persistent diarrhea with evidence of fissuring and ulceration in descending colon, erythematous mucosa in transverse colon, mild inflammation at IC valve. Corresponding histopathology is as as follows: severe chronic colitis in the descending colon, moderate colitis in the transverse colon, mild active colitis in the rectum, normal endoscopic exam of the ileum with histologic patchy ileitis.  Her symptoms were controlled on prednisone 30 mg and once this dose was tapered to 20 mg she started having symptoms again.     She started Skyrizi on 9/18 and had difficulty tapering off prednisone which was extended. She will decrease to 15 mg daily today. Valorie does express frustration with the many side effects of prednisone.     RECOMMENDATIONS:  PLAN  ---Continue to monitor on prednisone taper. If you prove to be steroid dependent, we can consider starting Uceris as an extended bridge to Skyrizi monotherapy.   --Colonoscopy ordered for March  --Referral to health psychology   --Please contact your PCP to get the following vaccines:   Prevnar-20  Shingrix series  Seasonal influenza  COVID-19 booster (when available)  Hepatitis A (if desired)    RTC 3 months    Aiyana Ivey MD on 8/30/2023 at 4:50 PM    A total of 40 minutes was spent in face to face evaluation with this patient, of which was included chart review, history and exam, documentation, counseling, coordinating a management plan and further activities as noted above for this patient on the date of the encounter.      ________________________________________________________________  HPI:    37 yr female with prediabetes, elevated BMI, status post right hemicolectomy (for intussusception as an infant) new diagnosis of Crohn's disease who presented to the clinic to establish care.     Pt reports that she had surgery for intususception when she was 3 months old, this involved a right hemicolectomy with a ileotransverse anastomosis.  Since then patient has soft stool about 1-2 times daily.    Last year she had a 2-month long episode of diarrhea (increased frequency of stool) associated with abdominal cramps.  She was seen by her primary care provider who prescribed Pepcid.  Her symptoms seem to have abated after 2 months.  That episode was attributed to likely a sequela of her prior colon surgery.    More recently late July to early August she started having diarrhea which she describes as multiple bowel movements throughout the day this was associated with urgency and tenesmus.  In addition she reports seeing blood in the stool intermittently.  This led to further work-up which involved testing for enteric panel and C. difficile which were negative.  Subsequently she was referred to GI, was seen by Dr. Zoraida Barrett who did a colonoscopy on her that showed evidence of Crohn's disease.  Patient was then started on prednisone 40 mg with a plan to taper 10 mg every week.    She is currently on prednisone 20 mg.  She reports that while she was on prednisone 30 mg she had good control of symptoms but within two days of reducing dose to 20 mg she started to experience symptoms again.  With regards to side effects while she was on prednisone, she reports increased appetite.     She denies any family history of inflammatory bowel disease or colon cancer.  No family history of any other autoimmune diseases.  She does report that her mother has a pulmonary embolism which was idiopathic and is on lifelong anticoagulation.  At one point of  time patient was also tested for hypercoagulability disorders and reportedly the work-up was negative..    Prior to the diagnosis of Crohn's she was using ibuprofen about 1-2 times per week for relief of headaches.  She also reports joint pains predominantly in the hands and shoulder region along with back pain.  She denies any redness or swelling of the joints and reported that the joint pains did not respond to prednisone.    Patient denies any history of smoking or alcohol.  She works as a  and is mostly involved with IT work.  She has no plans for having kids.    Interval history 10/2023  Had first Skyrizi infusion on 9/18, second on 10/16.   Tried to taper off prednisone after first infusion but had difficulty. Retrying after second infusion. Currently going down to 15 mg starting today.     Having some mild urgency.   Having a BM once every few hours. No nocturnal BMs, which was a problem in the past.   No blood per rectum.       ROS  Patient denies jaundice, abdominal distension, lower extremity edema, lethargy or confusion.  Patient denies fevers, sweats, chills or weight loss.    PMHx:  Past Medical History:   Diagnosis Date    Migraine     Noninfectious ileitis     Uncomplicated asthma      Patient Active Problem List   Diagnosis    Mild persistent asthma    Migraines    IBS (irritable bowel syndrome)    Anxiety and depression    Allergies    History of intussusception    Family history of factor V deficiency    Generalized anxiety disorder    Healthcare maintenance    Prediabetes    Morbid obesity (H)    Nausea    Hypoalbuminemia    Hepatic steatosis    Left knee pain    Elevated fecal calprotectin    Crohn's disease of both small and large intestine with rectal bleeding (H)       PSurgHx:  Past Surgical History:   Procedure Laterality Date    COLONOSCOPY N/A 8/3/2023    Procedure: COLONOSCOPY WITH BIOPSIES;  Surgeon: Zoraida Jamil MD;  Location: [facility] OR    INTUSSUSCEPTION REPAIR   1986    Hemicolectomy, removal of 11 cm of proximal and distal colon       MEDS:  Current Outpatient Medications   Medication    cetirizine (ZYRTEC) 10 MG tablet    citalopram (CELEXA) 20 MG tablet    famotidine (PEPCID) 20 MG tablet    fluticasone (FLONASE) 50 MCG/ACT nasal spray    hydrocortisone (CORTENEMA) 100 MG/60ML enema    levalbuterol (XOPENEX HFA) 45 MCG/ACT inhaler    multivitamin w/minerals (MULTI-VITAMIN) tablet    norethindrone-ethinyl estradiol (ORTHO-NOVUM) 1-35 MG-MCG tablet    predniSONE (DELTASONE) 5 MG tablet    propranolol ER (INDERAL LA) 60 MG 24 hr capsule    rimegepant (NURTEC) 75 MG ODT tablet     No current facility-administered medications for this visit.     ALLERGIES:    Allergies   Allergen Reactions    Codeine      FHx:  Family History   Problem Relation Age of Onset    Pulmonary Embolism Mother         In 2013 at age 63 years, Factor V deficiency, indefinite anticoagulation    Asthma Mother     Migraines Mother     Depression Mother     Depression Sister     Cancer Maternal Grandmother     Cancer Paternal Grandmother     Cerebrovascular Disease Paternal Grandfather     Glaucoma Maternal Great-Grandmother        SOCIAL Hx:  Social History     Socioeconomic History    Marital status: Single     Spouse name: Not on file    Number of children: Not on file    Years of education: Not on file    Highest education level: Not on file   Occupational History    Not on file   Tobacco Use    Smoking status: Never     Passive exposure: Never    Smokeless tobacco: Never   Substance and Sexual Activity    Alcohol use: Not Currently     Comment: Rare    Drug use: Never    Sexual activity: Yes     Partners: Male     Birth control/protection: Pill   Other Topics Concern    Not on file   Social History Narrative    6/21/2021 Employed as an  at [employer]. Still working from home but anticipating a return to the office. Male partner. Has a cat.      Social Determinants of Health  "    Financial Resource Strain: Not on file   Food Insecurity: Not on file   Transportation Needs: Not on file   Physical Activity: Not on file   Stress: Not on file   Social Connections: Not on file   Interpersonal Safety: Not on file   Housing Stability: Not on file       ROS: A comprehensive Review of Systems was asked and answered in the negative unless specifically commented upon in the HPI    Physical Exam  BP (!) 116/94 (BP Location: Left arm, Patient Position: Sitting, Cuff Size: Adult Large)   Pulse 90   Ht 1.74 m (5' 8.5\")   Wt 126 kg (277 lb 12.8 oz)   SpO2 99%   BMI 41.62 kg/m    Body mass index is 41.62 kg/m .  Limited exam VVC visit   Gen:Pt in no acute distress  HEENT: no scleral icterus.  Lungs: Non labored breathing  Skin: no jaundice  Neuro: Alert and oriented x 3     LABS:  Hospital Outpatient Visit on 08/03/2023   Component Date Value Ref Range Status    HCG Qual Urine 08/03/2023 Negative  Negative Final    Internal QC Check POCT 08/03/2023 Valid  Valid Final    POCT Kit Lot Number 08/03/2023 [id number]   Final    POCT Kit Expiration Date 08/03/2023 12/31/2023   Final    CRP Inflammation 08/03/2023 46.20 (H)  <5.00 mg/L Final    Erythrocyte Sedimentation Rate 08/03/2023 37 (H)  0 - 20 mm/hr Final    Vitamin B12 08/03/2023 589  232 - 1,245 pg/mL Final    Case Report 08/03/2023    Final                    Value:Surgical Pathology Report                         Case: [id number]                                  Authorizing Provider:  Zoraida Jamil MD        Collected:           08/03/2023 11:54 AM          Ordering Location:     [facility] OR  Received:            08/03/2023 01:39 PM                                 [place]                                                                  Pathologist:           Carol Rodriguez MD                                                             Specimens:   A) - Other, IIeum Biopsies                                                              "             B) - Other, IIeal Colonic Anastomosis Biopsies                                                      C) - Other, Tranverse Colon Biopsies                                                                D) - Other, Colon Biopsies 45 cm                                                                    E) - Other, Descending Colon Biopsies                                                                                         F) - Other, Rectal Biopsies                                                                Final Diagnosis 08/03/2023    Final                    Value:This result contains rich text formatting which cannot be displayed here.    Comment 08/03/2023    Final                    Value:This result contains rich text formatting which cannot be displayed here.    Clinical Information 08/03/2023    Final                    Value:This result contains rich text formatting which cannot be displayed here.    Gross Description 08/03/2023    Final                    Value:This result contains rich text formatting which cannot be displayed here.    Microscopic Description 08/03/2023    Final                    Value:This result contains rich text formatting which cannot be displayed here.    Performing Labs 08/03/2023    Final                    Value:This result contains rich text formatting which cannot be displayed here.    COLONOSCOPY 08/03/2023    Final                    Value:[facility]  [address] [place], [place] [address]., [place] [address] [phone]     Endoscopy Department  _______________________________________________________________________________  Patient Name: Lou Treviño          Procedure Date: 8/3/2023 10:32 AM  MRN: [id number]                       Account Number: [id number]  YOB: 1985               Admit Type: Outpatient  Age: 37                               Room: [facility] PROCEDURE ROOM 03  Gender: Female                        Note Status: Finalized  Attending MD:  AR FREED MD,     Total Sedation Time:   _______________________________________________________________________________     Procedure:             Colonoscopy  Indications:           Clinically significant diarrhea of unexplained origin  Providers:             AR FREED MD, Kristal Schroeder RN, Rachel Sam,                          CRNA  Referring MD:            Medicines:             Monitored Anesthesia Care  Complications:         No immediate complicat                          ions.  _______________________________________________________________________________  Procedure:             Pre-Anesthesia Assessment:                         - Prior to the procedure, a History and Physical was                          performed, and patient medications and allergies were                          reviewed. The patient is competent. The risks and                          benefits of the procedure and the sedation options and                          risks were discussed with the patient. All questions                          were answered and informed consent was obtained.                          Patient identification and proposed procedure were                          verified by the physician in the pre-procedure area.                          Mental Status Examination: alert and oriented. Airway                          Examination: normal oropharyngeal airway and neck                          mobility. Respiratory Examination: clear to                                                    auscultation. CV Examination: normal. Prophylactic                          Antibiotics: The patient does not require prophylactic                          antibiotics. Prior Anticoagulants: The patient has                          taken no anticoagulant or antiplatelet agents. ASA                          Grade Assessment: per anaesthesiology. After reviewing                          the risks and benefits, the  patient was deemed in                          satisfactory condition to undergo the procedure. The                          anesthesia plan was to use monitored anesthesia care                          (MAC). Immediately prior to administration of                          medications, the patient was re-assessed for adequacy                          to receive sedatives. The heart rate, respiratory                          rate, oxygen saturations, blood pressure, adequacy of                          pulmonary ventilation, and response to care were                                                    monitored throughout the procedure. The physical                          status of the patient was re-assessed after the                          procedure.                         After obtaining informed consent, the colonoscope was                          passed under direct vision. Throughout the procedure,                          the patient's blood pressure, pulse, and oxygen                          saturations were monitored continuously. The                          Colonoscope was introduced through the anus with the                          intention of advancing to the surgical stoma. The                          scope was advanced to the descending colon before the                          procedure was aborted. Medications were given. The                          Colonoscope was introduced through the anus and                          advanced to the ileocolonic anastomosis. The                          colonoscopy was performed without difficulty. The                                                    patient tolerated the procedure well. The quality of                          the bowel preparation was good. Ileum, IC anastomosis                          and rectum were photographed.                                                                                   Findings:       The diamond-terminal ileum  appeared normal. Biopsies were taken with a cold        forceps for histology.       Ileocolonic anastomosis identified and biopsied.       Inflammation noted in the colon: rectum was relatively spared however        there were a few scattered erosions and these were boipsied. From 10 cm        to 45 cm inflammation was severe, with deep fissuring ulcerations and        these were biopsied. From 45cm to 55 cm the mucosa hhad a normal or        erythematous appearance and this was biopsied. From 55 cm to the IC        anastomosis the inflammation was mild with loss of vascularity and        mildly granular appearance and this persisted to the anastomosis. The        ileum nusrat                          eared normal.                                                                                   Impression:            - The examined portion of the ileum was normal.                          Biopsied.                         - Ileocolonic anastomosis identified and biopsied.                         Inflammation noted in the colon: rectum was relatively                          spared however there were a few scattered erosions and                          these were boipsied. From 10 cm to 45 cm inflammation                          was severe, with deep fissuring ulcerations and these                          were biopsied. From 45cm to 55 cm the mucosa hhad a                          normal or erythematous appearance and this was                          biopsied. From 55 cm to the IC anastomosis the                          inflammation was mild with loss of vascularity and                          mildly granular appearance and this persisted to the                          anasto                          mosis. The ileum appeared normal.  Recommendation:        - Patient has a contact number available for                          emergencies. The signs and symptoms of potential                          delayed  complications were discussed with the patient.                          Return to normal activities tomorrow. Written                          discharge instructions were provided to the patient.                         - Resume previous diet.                         - Continue present medications.                         - Await pathology results.                         - Check CBC, ESR, CRP, Vit B12 today for baseline. CMP                          completed recently. today.                                                                                     Electronically Signed by: Dr. Ar Jamil  __________________  AR JAMIL MD  8/3/2023 12:31:22 PM  I was physically present for the entire viewing portion of the exam.  __________________________  Signature of michael presley physician  AR JAMIL MD  Number of Addenda: 0    Note Initiated On: 8/3/2023 10:32 AM  Scope In:  Scope Out:         Final Diagnosis   A. ILEUM, BIOPSY:  - Patchy mild active ileitis with cryptitis     B. ILEOCOLONIC ANASTOMOSIS, BIOPSY:  - Mildly active chronic colitis  - Negative for dysplasia     C. COLON, TRANSVERSE, BIOPSY:  - Moderate chronic active colitis with cryptitis and crypt abscess formation  - Negative for dysplasia     D. COLON, AT 45 CM, BIOPSY:  - Mildly active chronic colitis with cryptitis  - Negative for dysplasia     E. COLON, DESCENDING, BIOPSY:  - Severe chronic active colitis with focal ulceration  - Negative for dysplasia     F. COLON, RECTUM, BIOPSY:  - Mild focally active chronic colitis  - Negative for dysplasia

## 2023-10-31 NOTE — PATIENT INSTRUCTIONS
PLAN  --Continue to monitor on prednisone taper. If you prove to be steroid dependent, we can consider starting Uceris as an extended bridge to Skyrizi monotherapy.   --Colonoscopy ordered for March  --Referral to health psychology   --Please contact your PCP to get the following vaccines:   Prevnar-20  Shingrix series  Seasonal influenza  COVID-19 booster (when available)  Hepatitis A (if desired)

## 2023-10-31 NOTE — LETTER
"    10/31/2023         RE: Lou Treviño  [address]  [place] [address]        Dear Colleague,    Thank you for referring your patient, Lou Treviño, to the [facility] [place]. Please see a copy of my visit note below.    Chief Complaint   Patient presents with    Follow Up       Vitals:    10/31/23 1531   BP: (!) 116/94   BP Location: Left arm   Patient Position: Sitting   Cuff Size: Adult Large   Pulse: 90   SpO2: 99%   Weight: 126 kg (277 lb 12.8 oz)   Height: 1.74 m (5' 8.5\")       Body mass index is 41.62 kg/m .    James Epps      GASTROENTEROLOGY IBD  OUTPATIENT CLINIC CONSULT    DATE OF SERVICE: 10/31/2023  PROVIDER REQUESTING CONSULT: Referred Self  Reason for Consultation: New diagnosis of Crohn's disease     ASSESSMENT:  38-year-old female with prediabetes, elevated BMI, status post right hemicolectomy (for intussusception as an infant) with new diagnosis of Crohn's disease who presents to the clinic to establish care.      She underwent colonoscopy early August 2023 for persistent diarrhea with evidence of fissuring and ulceration in descending colon, erythematous mucosa in transverse colon, mild inflammation at IC valve. Corresponding histopathology is as as follows: severe chronic colitis in the descending colon, moderate colitis in the transverse colon, mild active colitis in the rectum, normal endoscopic exam of the ileum with histologic patchy ileitis.  Her symptoms were controlled on prednisone 30 mg and once this dose was tapered to 20 mg she started having symptoms again.     She started Skyrizi on 9/18 and had difficulty tapering off prednisone which was extended. She will decrease to 15 mg daily today. Valorie does express frustration with the many side effects of prednisone.     RECOMMENDATIONS:  PLAN  ---Continue to monitor on prednisone taper. If you prove to be steroid dependent, we can consider starting Uceris as an extended bridge to Skyrizi " monotherapy.   --Colonoscopy ordered for March  --Referral to health psychology   --Please contact your PCP to get the following vaccines:   Prevnar-20  Shingrix series  Seasonal influenza  COVID-19 booster (when available)  Hepatitis A (if desired)    RTC 3 months    Aiyana Ivey MD on 8/30/2023 at 4:50 PM    A total of 40 minutes was spent in face to face evaluation with this patient, of which was included chart review, history and exam, documentation, counseling, coordinating a management plan and further activities as noted above for this patient on the date of the encounter.     ________________________________________________________________  HPI:    37 yr female with prediabetes, elevated BMI, status post right hemicolectomy (for intussusception as an infant) new diagnosis of Crohn's disease who presented to the clinic to establish care.     Pt reports that she had surgery for intususception when she was 3 months old, this involved a right hemicolectomy with a ileotransverse anastomosis.  Since then patient has soft stool about 1-2 times daily.    Last year she had a 2-month long episode of diarrhea (increased frequency of stool) associated with abdominal cramps.  She was seen by her primary care provider who prescribed Pepcid.  Her symptoms seem to have abated after 2 months.  That episode was attributed to likely a sequela of her prior colon surgery.    More recently late July to early August she started having diarrhea which she describes as multiple bowel movements throughout the day this was associated with urgency and tenesmus.  In addition she reports seeing blood in the stool intermittently.  This led to further work-up which involved testing for enteric panel and C. difficile which were negative.  Subsequently she was referred to GI, was seen by Dr. Zoraida Barrett who did a colonoscopy on her that showed evidence of Crohn's disease.  Patient was then started on prednisone 40 mg with a plan to taper  10 mg every week.    She is currently on prednisone 20 mg.  She reports that while she was on prednisone 30 mg she had good control of symptoms but within two days of reducing dose to 20 mg she started to experience symptoms again.  With regards to side effects while she was on prednisone, she reports increased appetite.     She denies any family history of inflammatory bowel disease or colon cancer.  No family history of any other autoimmune diseases.  She does report that her mother has a pulmonary embolism which was idiopathic and is on lifelong anticoagulation.  At one point of time patient was also tested for hypercoagulability disorders and reportedly the work-up was negative..    Prior to the diagnosis of Crohn's she was using ibuprofen about 1-2 times per week for relief of headaches.  She also reports joint pains predominantly in the hands and shoulder region along with back pain.  She denies any redness or swelling of the joints and reported that the joint pains did not respond to prednisone.    Patient denies any history of smoking or alcohol.  She works as a  and is mostly involved with IT work.  She has no plans for having kids.    Interval history 10/2023  Had first Skyrizi infusion on 9/18, second on 10/16.   Tried to taper off prednisone after first infusion but had difficulty. Retrying after second infusion. Currently going down to 15 mg starting today.     Having some mild urgency.   Having a BM once every few hours. No nocturnal BMs, which was a problem in the past.   No blood per rectum.       ROS  Patient denies jaundice, abdominal distension, lower extremity edema, lethargy or confusion.  Patient denies fevers, sweats, chills or weight loss.    PMHx:  Past Medical History:   Diagnosis Date    Migraine     Noninfectious ileitis     Uncomplicated asthma      Patient Active Problem List   Diagnosis    Mild persistent asthma    Migraines    IBS (irritable bowel syndrome)     Anxiety and depression    Allergies    History of intussusception    Family history of factor V deficiency    Generalized anxiety disorder    Healthcare maintenance    Prediabetes    Morbid obesity (H)    Nausea    Hypoalbuminemia    Hepatic steatosis    Left knee pain    Elevated fecal calprotectin    Crohn's disease of both small and large intestine with rectal bleeding (H)       PSurgHx:  Past Surgical History:   Procedure Laterality Date    COLONOSCOPY N/A 8/3/2023    Procedure: COLONOSCOPY WITH BIOPSIES;  Surgeon: Zoraida Jamil MD;  Location: [facility] OR    INTUSSUSCEPTION REPAIR  1986    Hemicolectomy, removal of 11 cm of proximal and distal colon       MEDS:  Current Outpatient Medications   Medication    cetirizine (ZYRTEC) 10 MG tablet    citalopram (CELEXA) 20 MG tablet    famotidine (PEPCID) 20 MG tablet    fluticasone (FLONASE) 50 MCG/ACT nasal spray    hydrocortisone (CORTENEMA) 100 MG/60ML enema    levalbuterol (XOPENEX HFA) 45 MCG/ACT inhaler    multivitamin w/minerals (MULTI-VITAMIN) tablet    norethindrone-ethinyl estradiol (ORTHO-NOVUM) 1-35 MG-MCG tablet    predniSONE (DELTASONE) 5 MG tablet    propranolol ER (INDERAL LA) 60 MG 24 hr capsule    rimegepant (NURTEC) 75 MG ODT tablet     No current facility-administered medications for this visit.     ALLERGIES:    Allergies   Allergen Reactions    Codeine      FHx:  Family History   Problem Relation Age of Onset    Pulmonary Embolism Mother         In 2013 at age 63 years, Factor V deficiency, indefinite anticoagulation    Asthma Mother     Migraines Mother     Depression Mother     Depression Sister     Cancer Maternal Grandmother     Cancer Paternal Grandmother     Cerebrovascular Disease Paternal Grandfather     Glaucoma Maternal Great-Grandmother        SOCIAL Hx:  Social History     Socioeconomic History    Marital status: Single     Spouse name: Not on file    Number of children: Not on file    Years of education: Not on file    Highest  "education level: Not on file   Occupational History    Not on file   Tobacco Use    Smoking status: Never     Passive exposure: Never    Smokeless tobacco: Never   Substance and Sexual Activity    Alcohol use: Not Currently     Comment: Rare    Drug use: Never    Sexual activity: Yes     Partners: Male     Birth control/protection: Pill   Other Topics Concern    Not on file   Social History Narrative    6/21/2021 Employed as an  at [employer]. Still working from home but anticipating a return to the office. Male partner. Has a cat.      Social Determinants of Health     Financial Resource Strain: Not on file   Food Insecurity: Not on file   Transportation Needs: Not on file   Physical Activity: Not on file   Stress: Not on file   Social Connections: Not on file   Interpersonal Safety: Not on file   Housing Stability: Not on file       ROS: A comprehensive Review of Systems was asked and answered in the negative unless specifically commented upon in the HPI    Physical Exam  BP (!) 116/94 (BP Location: Left arm, Patient Position: Sitting, Cuff Size: Adult Large)   Pulse 90   Ht 1.74 m (5' 8.5\")   Wt 126 kg (277 lb 12.8 oz)   SpO2 99%   BMI 41.62 kg/m    Body mass index is 41.62 kg/m .  Limited exam VVC visit   Gen:Pt in no acute distress  HEENT: no scleral icterus.  Lungs: Non labored breathing  Skin: no jaundice  Neuro: Alert and oriented x 3     LABS:  Hospital Outpatient Visit on 08/03/2023   Component Date Value Ref Range Status    HCG Qual Urine 08/03/2023 Negative  Negative Final    Internal QC Check POCT 08/03/2023 Valid  Valid Final    POCT Kit Lot Number 08/03/2023 [id number]   Final    POCT Kit Expiration Date 08/03/2023 12/31/2023   Final    CRP Inflammation 08/03/2023 46.20 (H)  <5.00 mg/L Final    Erythrocyte Sedimentation Rate 08/03/2023 37 (H)  0 - 20 mm/hr Final    Vitamin B12 08/03/2023 589  232 - 1,245 pg/mL Final    Case Report 08/03/2023    Final                    Value:Surgical " Pathology Report                         Case: [id number]                                  Authorizing Provider:  Zoraida Jamil MD        Collected:           08/03/2023 11:54 AM          Ordering Location:     [facility] OR  Received:            08/03/2023 01:39 PM                                 [place]                                                                  Pathologist:           Carol Rodriguez MD                                                             Specimens:   A) - Other, IIeum Biopsies                                                                          B) - Other, IIeal Colonic Anastomosis Biopsies                                                      C) - Other, Tranverse Colon Biopsies                                                                D) - Other, Colon Biopsies 45 cm                                                                    E) - Other, Descending Colon Biopsies                                                                                         F) - Other, Rectal Biopsies                                                                Final Diagnosis 08/03/2023    Final                    Value:This result contains rich text formatting which cannot be displayed here.    Comment 08/03/2023    Final                    Value:This result contains rich text formatting which cannot be displayed here.    Clinical Information 08/03/2023    Final                    Value:This result contains rich text formatting which cannot be displayed here.    Gross Description 08/03/2023    Final                    Value:This result contains rich text formatting which cannot be displayed here.    Microscopic Description 08/03/2023    Final                    Value:This result contains rich text formatting which cannot be displayed here.    Performing Labs 08/03/2023    Final                    Value:This result contains rich text formatting which cannot be displayed  here.    COLONOSCOPY 08/03/2023    Final                    Value:[facility]  [address] [place] [address] [place] [address] [phone]     Endoscopy Department  _______________________________________________________________________________  Patient Name: Lou Treviño          Procedure Date: 8/3/2023 10:32 AM  MRN: [id number]                       Account Number: [id number]  YOB: 1985               Admit Type: Outpatient  Age: 37                               Room: [facility] PROCEDURE ROOM 03  Gender: Female                        Note Status: Finalized  Attending MD: AR FREED MD,     Total Sedation Time:   _______________________________________________________________________________     Procedure:             Colonoscopy  Indications:           Clinically significant diarrhea of unexplained origin  Providers:             AR FREED MD, Kristal Schroeder RN, Rachel Sam,                          CRNA  Referring MD:            Medicines:             Monitored Anesthesia Care  Complications:         No immediate complicat                          ions.  _______________________________________________________________________________  Procedure:             Pre-Anesthesia Assessment:                         - Prior to the procedure, a History and Physical was                          performed, and patient medications and allergies were                          reviewed. The patient is competent. The risks and                          benefits of the procedure and the sedation options and                          risks were discussed with the patient. All questions                          were answered and informed consent was obtained.                          Patient identification and proposed procedure were                          verified by the physician in the pre-procedure area.                          Mental Status Examination: alert and oriented. Airway                           Examination: normal oropharyngeal airway and neck                          mobility. Respiratory Examination: clear to                                                    auscultation. CV Examination: normal. Prophylactic                          Antibiotics: The patient does not require prophylactic                          antibiotics. Prior Anticoagulants: The patient has                          taken no anticoagulant or antiplatelet agents. ASA                          Grade Assessment: per anaesthesiology. After reviewing                          the risks and benefits, the patient was deemed in                          satisfactory condition to undergo the procedure. The                          anesthesia plan was to use monitored anesthesia care                          (MAC). Immediately prior to administration of                          medications, the patient was re-assessed for adequacy                          to receive sedatives. The heart rate, respiratory                          rate, oxygen saturations, blood pressure, adequacy of                          pulmonary ventilation, and response to care were                                                    monitored throughout the procedure. The physical                          status of the patient was re-assessed after the                          procedure.                         After obtaining informed consent, the colonoscope was                          passed under direct vision. Throughout the procedure,                          the patient's blood pressure, pulse, and oxygen                          saturations were monitored continuously. The                          Colonoscope was introduced through the anus with the                          intention of advancing to the surgical stoma. The                          scope was advanced to the descending colon before the                          procedure was aborted. Medications were  given. The                          Colonoscope was introduced through the anus and                          advanced to the ileocolonic anastomosis. The                          colonoscopy was performed without difficulty. The                                                    patient tolerated the procedure well. The quality of                          the bowel preparation was good. Ileum, IC anastomosis                          and rectum were photographed.                                                                                   Findings:       The diamond-terminal ileum appeared normal. Biopsies were taken with a cold        forceps for histology.       Ileocolonic anastomosis identified and biopsied.       Inflammation noted in the colon: rectum was relatively spared however        there were a few scattered erosions and these were boipsied. From 10 cm        to 45 cm inflammation was severe, with deep fissuring ulcerations and        these were biopsied. From 45cm to 55 cm the mucosa hhad a normal or        erythematous appearance and this was biopsied. From 55 cm to the IC        anastomosis the inflammation was mild with loss of vascularity and        mildly granular appearance and this persisted to the anastomosis. The        ileum nusrat                          eared normal.                                                                                   Impression:            - The examined portion of the ileum was normal.                          Biopsied.                         - Ileocolonic anastomosis identified and biopsied.                         Inflammation noted in the colon: rectum was relatively                          spared however there were a few scattered erosions and                          these were boipsied. From 10 cm to 45 cm inflammation                          was severe, with deep fissuring ulcerations and these                          were biopsied. From 45cm to 55 cm  the mucosa hhad a                          normal or erythematous appearance and this was                          biopsied. From 55 cm to the IC anastomosis the                          inflammation was mild with loss of vascularity and                          mildly granular appearance and this persisted to the                          anasto                          mosis. The ileum appeared normal.  Recommendation:        - Patient has a contact number available for                          emergencies. The signs and symptoms of potential                          delayed complications were discussed with the patient.                          Return to normal activities tomorrow. Written                          discharge instructions were provided to the patient.                         - Resume previous diet.                         - Continue present medications.                         - Await pathology results.                         - Check CBC, ESR, CRP, Vit B12 today for baseline. CMP                          completed recently. today.                                                                                     Electronically Signed by: Dr. Ar Jamil  __________________  AR JAMIL MD  8/3/2023 12:31:22 PM  I was physically present for the entire viewing portion of the exam.  __________________________  Signature of michael presley physician  AR JAMIL MD  Number of Addenda: 0    Note Initiated On: 8/3/2023 10:32 AM  Scope In:  Scope Out:         Final Diagnosis   A. ILEUM, BIOPSY:  - Patchy mild active ileitis with cryptitis     B. ILEOCOLONIC ANASTOMOSIS, BIOPSY:  - Mildly active chronic colitis  - Negative for dysplasia     C. COLON, TRANSVERSE, BIOPSY:  - Moderate chronic active colitis with cryptitis and crypt abscess formation  - Negative for dysplasia     D. COLON, AT 45 CM, BIOPSY:  - Mildly active chronic colitis with cryptitis  - Negative for dysplasia      E. COLON, DESCENDING, BIOPSY:  - Severe chronic active colitis with focal ulceration  - Negative for dysplasia     F. COLON, RECTUM, BIOPSY:  - Mild focally active chronic colitis  - Negative for dysplasia         Again, thank you for allowing me to participate in the care of your patient.      Sincerely,    Aiyana Ivey MD

## 2023-11-06 DIAGNOSIS — F41.9 ANXIETY AND DEPRESSION: ICD-10-CM

## 2023-11-06 DIAGNOSIS — F32.A ANXIETY AND DEPRESSION: ICD-10-CM

## 2023-11-07 RX ORDER — CITALOPRAM HYDROBROMIDE 20 MG/1
20 TABLET ORAL DAILY
Qty: 90 TABLET | Refills: 1 | Status: SHIPPED | OUTPATIENT
Start: 2023-11-07 | End: 2024-07-29

## 2023-11-10 ENCOUNTER — ALLIED HEALTH/NURSE VISIT (OUTPATIENT)
Dept: FAMILY MEDICINE | Facility: CLINIC | Age: 38
End: 2023-11-10
Payer: COMMERCIAL

## 2023-11-10 DIAGNOSIS — Z23 HIGH PRIORITY FOR 2019-NCOV VACCINE: ICD-10-CM

## 2023-11-10 DIAGNOSIS — Z23 NEED FOR PROPHYLACTIC VACCINATION AND INOCULATION AGAINST INFLUENZA: Primary | ICD-10-CM

## 2023-11-10 PROCEDURE — 91320 SARSCV2 VAC 30MCG TRS-SUC IM: CPT

## 2023-11-10 PROCEDURE — 90472 IMMUNIZATION ADMIN EACH ADD: CPT

## 2023-11-10 PROCEDURE — 90480 ADMN SARSCOV2 VAC 1/ONLY CMP: CPT

## 2023-11-10 PROCEDURE — 90686 IIV4 VACC NO PRSV 0.5 ML IM: CPT

## 2023-11-10 PROCEDURE — 90471 IMMUNIZATION ADMIN: CPT

## 2023-11-10 PROCEDURE — 90677 PCV20 VACCINE IM: CPT

## 2023-11-10 PROCEDURE — 99207 PR NO BILLABLE SERVICE THIS VISIT: CPT

## 2023-11-10 NOTE — PROGRESS NOTES
Prior to immunization administration, verified patients identity using patient s name and date of birth. Please see Immunization Activity for additional information.     Screening Questionnaire for Adult Immunization    Are you sick today?   No   Do you have allergies to medications, food, a vaccine component or latex?   No   Have you ever had a serious reaction after receiving a vaccination?   No   Do you have a long-term health problem with heart, lung, kidney, or metabolic disease (e.g., diabetes), asthma, a blood disorder, no spleen, complement component deficiency, a cochlear implant, or a spinal fluid leak?  Are you on long-term aspirin therapy?   No   Do you have cancer, leukemia, HIV/AIDS, or any other immune system problem?   No   Do you have a parent, brother, or sister with an immune system problem?   No   In the past 3 months, have you taken medications that affect  your immune system, such as prednisone, other steroids, or anticancer drugs; drugs for the treatment of rheumatoid arthritis, Crohn s disease, or psoriasis; or have you had radiation treatments?   No   Have you had a seizure, or a brain or other nervous system problem?   No   During the past year, have you received a transfusion of blood or blood    products, or been given immune (gamma) globulin or antiviral drug?   No   For women: Are you pregnant or is there a chance you could become       pregnant during the next month?   No   Have you received any vaccinations in the past 4 weeks?   No     Immunization questionnaire answers were all negative.    I have reviewed the following standing orders:   This patient is due and qualifies for the Covid-19 vaccine.     Click here for COVID-19 Standing Order    I have reviewed the vaccines inclusion and exclusion criteria; No concerns regarding eligibility.     This patient is due and qualifies for the Influenza vaccine.    Click here for Influenza Vaccine Standing Order    I have reviewed the  vaccines inclusion and exclusion criteria; No concerns regarding eligibility.     Gsve pt vaccine. Pt did great. Still waiting in the room will check back with pt and will let her go      This patient is due and qualifies for the Pneumococcal vaccine.    Click here for Pneumococcal (Adult) Standing Order    I have reviewed the vaccines inclusion and exclusion criteria;No concerns regarding eligibility.     Patient instructed to remain in clinic for 15 minutes afterwards, and to report any adverse reactions.     Screening performed by Kristi Trent on 11/10/2023 at 2:26 PM.

## 2023-11-13 ENCOUNTER — INFUSION THERAPY VISIT (OUTPATIENT)
Dept: INFUSION THERAPY | Facility: CLINIC | Age: 38
End: 2023-11-13
Attending: INTERNAL MEDICINE
Payer: COMMERCIAL

## 2023-11-13 VITALS
DIASTOLIC BLOOD PRESSURE: 72 MMHG | WEIGHT: 276.3 LBS | OXYGEN SATURATION: 98 % | RESPIRATION RATE: 16 BRPM | HEART RATE: 76 BPM | SYSTOLIC BLOOD PRESSURE: 102 MMHG | TEMPERATURE: 98.3 F | BODY MASS INDEX: 41.4 KG/M2

## 2023-11-13 DIAGNOSIS — K50.818 CROHN'S DISEASE OF BOTH SMALL AND LARGE INTESTINE WITH OTHER COMPLICATION (H): Primary | ICD-10-CM

## 2023-11-13 DIAGNOSIS — K50.811 CROHN'S DISEASE OF BOTH SMALL AND LARGE INTESTINE WITH RECTAL BLEEDING (H): Primary | ICD-10-CM

## 2023-11-13 LAB
ALBUMIN SERPL BCG-MCNC: 4 G/DL (ref 3.5–5.2)
ALP SERPL-CCNC: 57 U/L (ref 35–104)
ALT SERPL W P-5'-P-CCNC: 31 U/L (ref 0–50)
AST SERPL W P-5'-P-CCNC: 24 U/L (ref 0–45)
BILIRUB DIRECT SERPL-MCNC: <0.2 MG/DL (ref 0–0.3)
BILIRUB SERPL-MCNC: 0.2 MG/DL
PROT SERPL-MCNC: 7.5 G/DL (ref 6.4–8.3)

## 2023-11-13 PROCEDURE — 80076 HEPATIC FUNCTION PANEL: CPT | Performed by: INTERNAL MEDICINE

## 2023-11-13 PROCEDURE — 36415 COLL VENOUS BLD VENIPUNCTURE: CPT | Performed by: INTERNAL MEDICINE

## 2023-11-13 PROCEDURE — 250N000011 HC RX IP 250 OP 636: Performed by: INTERNAL MEDICINE

## 2023-11-13 PROCEDURE — 258N000003 HC RX IP 258 OP 636: Performed by: INTERNAL MEDICINE

## 2023-11-13 PROCEDURE — 96365 THER/PROPH/DIAG IV INF INIT: CPT

## 2023-11-13 RX ORDER — DIPHENHYDRAMINE HYDROCHLORIDE 50 MG/ML
50 INJECTION INTRAMUSCULAR; INTRAVENOUS
Status: CANCELLED
Start: 2023-11-13

## 2023-11-13 RX ORDER — EPINEPHRINE 1 MG/ML
0.3 INJECTION, SOLUTION INTRAMUSCULAR; SUBCUTANEOUS EVERY 5 MIN PRN
Status: CANCELLED | OUTPATIENT
Start: 2023-11-13

## 2023-11-13 RX ORDER — MEPERIDINE HYDROCHLORIDE 25 MG/ML
25 INJECTION INTRAMUSCULAR; INTRAVENOUS; SUBCUTANEOUS EVERY 30 MIN PRN
Status: CANCELLED | OUTPATIENT
Start: 2023-11-13

## 2023-11-13 RX ORDER — RISANKIZUMAB-RZAA 360 MG/2.4
360 WEARABLE INJECTOR SUBCUTANEOUS
Qty: 2.4 ML | Refills: 2 | Status: SHIPPED | OUTPATIENT
Start: 2023-11-13 | End: 2024-03-18

## 2023-11-13 RX ORDER — ALBUTEROL SULFATE 90 UG/1
1-2 AEROSOL, METERED RESPIRATORY (INHALATION)
Status: CANCELLED
Start: 2023-11-13

## 2023-11-13 RX ORDER — METHYLPREDNISOLONE SODIUM SUCCINATE 125 MG/2ML
125 INJECTION, POWDER, LYOPHILIZED, FOR SOLUTION INTRAMUSCULAR; INTRAVENOUS
Status: CANCELLED
Start: 2023-11-13

## 2023-11-13 RX ORDER — ALBUTEROL SULFATE 0.83 MG/ML
2.5 SOLUTION RESPIRATORY (INHALATION)
Status: CANCELLED | OUTPATIENT
Start: 2023-11-13

## 2023-11-13 RX ADMIN — DEXTROSE MONOHYDRATE 250 ML: 50 INJECTION, SOLUTION INTRAVENOUS at 16:19

## 2023-11-13 RX ADMIN — DEXTROSE MONOHYDRATE 600 MG: 50 INJECTION, SOLUTION INTRAVENOUS at 16:18

## 2023-11-13 NOTE — PROGRESS NOTES
Nursing Note  Lou Treviño presents today [facility] for:   Chief Complaint   Patient presents with    Infusion     Diane       During today's [facility] appointment, orders from Dr ALEXA Ivey were completed.  Frequency: Dose #3/3    Progress note:  Patient identification verified by name and date of birth.  Assessment completed.  Vitals recorded in Doc Flowsheets.  Patient was provided with education regarding medication/procedure and possible side effects.  Patient verbalized understanding.     present during visit today: Not Applicable.    Treatment Conditions: ~~~ NOTE: If the patient answers yes to any of the questions below, hold the infusion and contact ordering provider or on-call provider.    Have you recently had an elevated temperature, fever, chills, productive cough, coughing for 3 weeks or longer or hemoptysis,  abnormal vital signs, night sweats,  chest pain or have you noticed a decrease in your appetite, unexplained weight loss or fatigue? No  Do you have any open wounds or new incisions? No  Do you have any upcoming hospitalizations or surgeries? Does not include esophagogastroduodenoscopy, colonoscopy, endoscopic retrograde cholangiopancreatography (ERCP), endoscopic ultrasound (EUS), dental procedures or joint aspiration/steroid injections No  Do you currently have any signs of illness or infection or are you on any antibiotics? No  Have you had any new, sudden or worsening abdominal pain? No  Have you or anyone in your household received a live vaccination in the past 4 weeks? Please note: No live vaccines while on biologic/chemotherapy until 6 months after the last treatment. Patient can receive the flu vaccine (shot only), pneumovax and the Covid vaccine. It is optimal for the patient to get these vaccines mid cycle, but they can be given at any time as long as it is not on the day of the infusion. No  Have you  recently been diagnosed with any new nervous system diseases (ie. Multiple sclerosis, Guillain [place], seizures, neurological changes) or cancer diagnosis? Are you on any form of radiation or chemotherapy? No  Are you pregnant or breast feeding or do you have plans of pregnancy in the future? No  Have you been having any signs of worsening depression or suicidal ideations?  (benlysta only) NA  Have there been any other new onset medical symptoms? No  Have you had any new blood clots? (IVIG only) NA    Premedications: were not ordered.    Drug Waste Record: No    Infusion length and rate:  infusion given over approximately  1 hours    Labs: were drawn per orders.     Vascular access: peripheral IV placed today.    Is the next appt scheduled? NA (Patient will transition to home injections - per orders).    Post Infusion Assessment:  Patient tolerated infusion without incident.  Blood return noted pre and post infusion.  Site patent and intact, free from redness, edema or discomfort.  No evidence of extravasations.  Access discontinued per protocol.     Discharge Plan:   Follow up plan of care with: home care/home infusion.  Discharge instructions were reviewed with patient.  Patient/representative verbalized understanding of discharge instructions and all questions answered.  Patient discharged from [facility] in stable condition.    Mike Cortez RN    Administrations This Visit       dextrose 5% BOLUS 250 mL       Admin Date  11/13/2023 Action  $New Bag Dose  250 mL Route  Intravenous Documented By  Mike Cortez RN              risankizumab-rzaa (SKYRIZI) 600 mg in D5W 285 mL infusion       Admin Date  11/13/2023 Action  $New Bag Dose  600 mg Rate  285 mL/hr Route  Intravenous Documented By  Mike Cortez RN                    /72 (BP Location: Right arm, Patient Position: Semi-Vital's, Cuff Size: Adult Large)   Pulse 76   Temp 98.3  F (36.8  C) (Oral)   Resp  16   Wt 125.3 kg (276 lb 4.8 oz)   SpO2 98%   BMI 41.40 kg/m

## 2023-11-21 ENCOUNTER — MYC REFILL (OUTPATIENT)
Dept: FAMILY MEDICINE | Facility: CLINIC | Age: 38
End: 2023-11-21
Payer: COMMERCIAL

## 2023-11-21 DIAGNOSIS — R10.84 GENERALIZED POSTPRANDIAL ABDOMINAL PAIN: ICD-10-CM

## 2023-11-21 DIAGNOSIS — R11.0 NAUSEA: ICD-10-CM

## 2023-11-21 DIAGNOSIS — Z30.41 SURVEILLANCE OF PREVIOUSLY PRESCRIBED CONTRACEPTIVE PILL: ICD-10-CM

## 2023-11-21 RX ORDER — FAMOTIDINE 20 MG/1
20 TABLET, FILM COATED ORAL 2 TIMES DAILY
Qty: 180 TABLET | Refills: 0 | Status: SHIPPED | OUTPATIENT
Start: 2023-11-21

## 2023-12-01 ENCOUNTER — TELEPHONE (OUTPATIENT)
Dept: GASTROENTEROLOGY | Facility: CLINIC | Age: 38
End: 2023-12-01
Payer: COMMERCIAL

## 2023-12-01 DIAGNOSIS — K50.818 CROHN'S DISEASE OF BOTH SMALL AND LARGE INTESTINE WITH OTHER COMPLICATION (H): Primary | ICD-10-CM

## 2023-12-01 NOTE — TELEPHONE ENCOUNTER
"Endoscopy Scheduling Screen    Have you had a positive Covid test in the last 14 days?  No    Are you active on MyChart?   Yes    What insurance is in the chart?  Other:      Ordering/Referring Provider:     IRENA BURRELL      (If ordering provider performs procedure, schedule with ordering provider unless otherwise instructed. )    BMI: Estimated body mass index is 41.4 kg/m  as calculated from the following:    Height as of 10/31/23: 1.74 m (5' 8.5\").    Weight as of 11/13/23: 125.3 kg (276 lb 4.8 oz).     Sedation Ordered  MAC/deep sedation.   BMI<= 45 45 < BMI <= 48 48 < BMI < = 50  BMI > 50   No Restrictions No [facility]  No [facility]  [facility] with exceptions Hospital Only OR Only       Are you taking any prescription medications for pain 3 or more times per week?   No    Do you have a history of malignant hyperthermia or adverse reaction to anesthesia?  No    (Females) Are you currently pregnant?   No     Have you been diagnosed or told you have pulmonary hypertension?   No    Do you have an LVAD?  No    Have you been told you have moderate to severe sleep apnea?  No    Have you been told you have COPD, asthma, or any other lung disease?  Yes     What breathing problems do you have?  Asthma     Do you use home oxygen?  No    Have your breathing problems required an ED visit or hospitalization in the last year?  No    Do you have any heart conditions?  No     Have you ever had an organ transplant?   No    Have you ever had or are you awaiting a heart or lung transplant?   No    Have you had a stroke or transient ischemic attack (TIA aka \"mini stroke\" in the last 6 months?   No    Have you been diagnosed with or been told you have cirrhosis of the liver?   No    Are you currently on dialysis?   No    Do you need assistance transferring?   No    BMI: Estimated body mass index is 41.4 kg/m  as calculated from the following:    Height as of 10/31/23: 1.74 m (5' 8.5\").    Weight as of 11/13/23: 125.3 kg (276 lb " 4.8 oz).     Is patients BMI > 40 and scheduling location [facility]?  No    Do you take an injectable medication for weight loss or diabetes (excluding insulin)?  No    Do you take the medication Naltrexone?  No    Do you take blood thinners?  No       Prep   Are you currently on dialysis or do you have chronic kidney disease?  No    Do you have a diagnosis of diabetes?  No    Do you have a diagnosis of cystic fibrosis (CF)?  No    On a regular basis do you go 3 -5 days between bowel movements?  No    BMI > 40?  Yes (Extended Prep)    Preferred Pharmacy:    [id number] - [place], [place] - [address]  [address]  [facility] [address]  Phone: [phone] Fax: [phone]      Final Scheduling Details   Colonoscopy prep sent?  Golytely Extended Prep    Procedure scheduled  Colonoscopy    Surgeon:  ASHANTI     Date of procedure:  03/25/2024     Pre-OP / PAC:   No - Not required for this site.    Location  [facility] - Per order.    Sedation   MAC/Deep Sedation - Per order.      Patient Reminders:   You will receive a call from a Nurse to review instructions and health history.  This assessment must be completed prior to your procedure.  Failure to complete the Nurse assessment may result in the procedure being cancelled.      On the day of your procedure, please designate an adult(s) who can drive you home stay with you for the next 24 hours. The medicines used in the exam will make you sleepy. You will not be able to drive.      You cannot take public transportation, ride share services, or non-medical taxi service without a responsible caregiver.  Medical transport services are allowed with the requirement that a responsible caregiver will receive you at your destination.  We require that drivers and caregivers are confirmed prior to your procedure.

## 2023-12-13 ENCOUNTER — ALLIED HEALTH/NURSE VISIT (OUTPATIENT)
Dept: GASTROENTEROLOGY | Facility: CLINIC | Age: 38
End: 2023-12-13
Payer: COMMERCIAL

## 2023-12-13 DIAGNOSIS — K50.811 CROHN'S DISEASE OF BOTH SMALL AND LARGE INTESTINE WITH RECTAL BLEEDING (H): Primary | ICD-10-CM

## 2023-12-13 PROCEDURE — 99207 PR NO CHARGE NURSE ONLY: CPT

## 2023-12-13 NOTE — PROGRESS NOTES
Clinic visit today for first Skyrizi OBI injection. The patient brought her own supply of medication to this appointment.    Education provided on medication vial/OBI inspection, temperature control (remove from refrigerator 45-90 minutes prior to injection), administration of medication, sharps disposal, and refill protocol.        Training video completed together. Patient successfully self-administered her dose under supervision.       Reviewed symptoms of a reaction and monitored patient for 15 minutes after injection. No reaction noted.     All questions answered at this time. Patient feels comfortable administering her injections independently at home for future doses.    Next dose in 8 weeks. Next dose 2/7/24.     Patient continues to have some urgency. Food-sensitivity at times. Prednisone is completed.

## 2023-12-15 ENCOUNTER — MYC MEDICAL ADVICE (OUTPATIENT)
Dept: FAMILY MEDICINE | Facility: CLINIC | Age: 38
End: 2023-12-15
Payer: COMMERCIAL

## 2023-12-20 ENCOUNTER — LAB (OUTPATIENT)
Dept: LAB | Facility: CLINIC | Age: 38
End: 2023-12-20
Payer: COMMERCIAL

## 2023-12-20 DIAGNOSIS — K50.811 CROHN'S DISEASE OF BOTH SMALL AND LARGE INTESTINE WITH RECTAL BLEEDING (H): ICD-10-CM

## 2023-12-20 LAB
BASOPHILS # BLD AUTO: 0.1 10E3/UL (ref 0–0.2)
BASOPHILS NFR BLD AUTO: 1 %
EOSINOPHIL # BLD AUTO: 0.4 10E3/UL (ref 0–0.7)
EOSINOPHIL NFR BLD AUTO: 4 %
ERYTHROCYTE [DISTWIDTH] IN BLOOD BY AUTOMATED COUNT: 12.1 % (ref 10–15)
ERYTHROCYTE [SEDIMENTATION RATE] IN BLOOD BY WESTERGREN METHOD: 21 MM/HR (ref 0–20)
HCT VFR BLD AUTO: 41 % (ref 35–47)
HGB BLD-MCNC: 12.8 G/DL (ref 11.7–15.7)
IMM GRANULOCYTES # BLD: 0 10E3/UL
IMM GRANULOCYTES NFR BLD: 0 %
LYMPHOCYTES # BLD AUTO: 2.8 10E3/UL (ref 0.8–5.3)
LYMPHOCYTES NFR BLD AUTO: 34 %
MCH RBC QN AUTO: 27.8 PG (ref 26.5–33)
MCHC RBC AUTO-ENTMCNC: 31.2 G/DL (ref 31.5–36.5)
MCV RBC AUTO: 89 FL (ref 78–100)
MONOCYTES # BLD AUTO: 0.7 10E3/UL (ref 0–1.3)
MONOCYTES NFR BLD AUTO: 8 %
NEUTROPHILS # BLD AUTO: 4.4 10E3/UL (ref 1.6–8.3)
NEUTROPHILS NFR BLD AUTO: 53 %
PLATELET # BLD AUTO: 321 10E3/UL (ref 150–450)
RBC # BLD AUTO: 4.6 10E6/UL (ref 3.8–5.2)
WBC # BLD AUTO: 8.3 10E3/UL (ref 4–11)

## 2023-12-20 PROCEDURE — 80053 COMPREHEN METABOLIC PANEL: CPT

## 2023-12-20 PROCEDURE — 85025 COMPLETE CBC W/AUTO DIFF WBC: CPT

## 2023-12-20 PROCEDURE — 86140 C-REACTIVE PROTEIN: CPT

## 2023-12-20 PROCEDURE — 36415 COLL VENOUS BLD VENIPUNCTURE: CPT

## 2023-12-20 PROCEDURE — 85652 RBC SED RATE AUTOMATED: CPT

## 2023-12-21 LAB
ALBUMIN SERPL BCG-MCNC: 4 G/DL (ref 3.5–5.2)
ALP SERPL-CCNC: 63 U/L (ref 40–150)
ALT SERPL W P-5'-P-CCNC: 41 U/L (ref 0–50)
ANION GAP SERPL CALCULATED.3IONS-SCNC: 11 MMOL/L (ref 7–15)
AST SERPL W P-5'-P-CCNC: 50 U/L (ref 0–45)
BILIRUB SERPL-MCNC: 0.2 MG/DL
BUN SERPL-MCNC: 8 MG/DL (ref 6–20)
CALCIUM SERPL-MCNC: 9.1 MG/DL (ref 8.6–10)
CHLORIDE SERPL-SCNC: 105 MMOL/L (ref 98–107)
CREAT SERPL-MCNC: 0.65 MG/DL (ref 0.51–0.95)
CRP SERPL-MCNC: 19.5 MG/L
DEPRECATED HCO3 PLAS-SCNC: 24 MMOL/L (ref 22–29)
EGFRCR SERPLBLD CKD-EPI 2021: >90 ML/MIN/1.73M2
GLUCOSE SERPL-MCNC: 113 MG/DL (ref 70–99)
POTASSIUM SERPL-SCNC: 3.8 MMOL/L (ref 3.4–5.3)
PROT SERPL-MCNC: 7.3 G/DL (ref 6.4–8.3)
SODIUM SERPL-SCNC: 140 MMOL/L (ref 135–145)

## 2024-01-04 ENCOUNTER — TELEPHONE (OUTPATIENT)
Dept: GASTROENTEROLOGY | Facility: CLINIC | Age: 39
End: 2024-01-04
Payer: COMMERCIAL

## 2024-01-04 NOTE — TELEPHONE ENCOUNTER
Left Voicemail (1st Attempt) for the patient to call back and schedule the following:    Appointment type: reschedule  Provider: Dr. Ivey  Return date: next available  Specialty phone number: [phone]  Additional appointment(s) needed:   Additonal Notes:

## 2024-01-09 ENCOUNTER — OFFICE VISIT (OUTPATIENT)
Dept: FAMILY MEDICINE | Facility: CLINIC | Age: 39
End: 2024-01-09
Payer: COMMERCIAL

## 2024-01-09 VITALS
SYSTOLIC BLOOD PRESSURE: 126 MMHG | WEIGHT: 276 LBS | HEART RATE: 83 BPM | TEMPERATURE: 99.3 F | HEIGHT: 70 IN | DIASTOLIC BLOOD PRESSURE: 85 MMHG | RESPIRATION RATE: 20 BRPM | OXYGEN SATURATION: 97 % | BODY MASS INDEX: 39.51 KG/M2

## 2024-01-09 DIAGNOSIS — R06.83 SNORING: ICD-10-CM

## 2024-01-09 DIAGNOSIS — J45.30 MILD PERSISTENT ASTHMA WITHOUT COMPLICATION: Primary | ICD-10-CM

## 2024-01-09 DIAGNOSIS — K50.811 CROHN'S DISEASE OF BOTH SMALL AND LARGE INTESTINE WITH RECTAL BLEEDING (H): ICD-10-CM

## 2024-01-09 DIAGNOSIS — R53.83 FATIGUE, UNSPECIFIED TYPE: ICD-10-CM

## 2024-01-09 PROCEDURE — 99215 OFFICE O/P EST HI 40 MIN: CPT | Performed by: FAMILY MEDICINE

## 2024-01-09 RX ORDER — BUDESONIDE AND FORMOTEROL FUMARATE DIHYDRATE 80; 4.5 UG/1; UG/1
AEROSOL RESPIRATORY (INHALATION)
Qty: 10.2 G | Refills: 1 | Status: SHIPPED | OUTPATIENT
Start: 2024-01-09 | End: 2024-01-23 | Stop reason: ALTCHOICE

## 2024-01-09 ASSESSMENT — ASTHMA QUESTIONNAIRES
QUESTION_1 LAST FOUR WEEKS HOW MUCH OF THE TIME DID YOUR ASTHMA KEEP YOU FROM GETTING AS MUCH DONE AT WORK, SCHOOL OR AT HOME: A LITTLE OF THE TIME
QUESTION_5 LAST FOUR WEEKS HOW WOULD YOU RATE YOUR ASTHMA CONTROL: SOMEWHAT CONTROLLED
ACT_TOTALSCORE: 18
QUESTION_3 LAST FOUR WEEKS HOW OFTEN DID YOUR ASTHMA SYMPTOMS (WHEEZING, COUGHING, SHORTNESS OF BREATH, CHEST TIGHTNESS OR PAIN) WAKE YOU UP AT NIGHT OR EARLIER THAN USUAL IN THE MORNING: NOT AT ALL
QUESTION_4 LAST FOUR WEEKS HOW OFTEN HAVE YOU USED YOUR RESCUE INHALER OR NEBULIZER MEDICATION (SUCH AS ALBUTEROL): ONCE A WEEK OR LESS
QUESTION_2 LAST FOUR WEEKS HOW OFTEN HAVE YOU HAD SHORTNESS OF BREATH: ONCE A DAY
ACT_TOTALSCORE: 18

## 2024-01-09 NOTE — PROGRESS NOTES
Assessment & Plan     Mild persistent asthma without complication  Worsening asthma symptoms of wheezing and nasal congestion and sneezing with environmental allergies, now in mild persistent range with use of TAMARA alone. Would benefit from additional meds for control. Unclear of exacting inciting triggers - perhaps more environmental allergens this fall. Symptom onset coincided with initiation of Skyrizi, but this is not a symptom set that was noted in the clinical trials. Discussed SMART therapy and she is interested in trying this. Will start with BID dosing and then PRN use of Symbicort if affordable. If not, will explore alternatives. Once symptoms improve, could decrease to PRN use of ICS-LABA or ICS with TAMARA per LISANDRO.  - budesonide-formoterol (SYMBICORT) 80-4.5 MCG/ACT Inhaler; Use 1 puff in the morning and 1 puff in the evening. Can use 1 puff every 4 hours as needed for shortness of breath or wheezing. Maximum 12 puffs per day.    Snoring  Fatigue, unspecified type  Longstanding history of daytime somnolence, need for excessive sleep, and snoring. Body mass index is 39.96 kg/m . No witnessed apneas. Would benefit from a sleep study to rule out ERIC given risk factors. STOP-BANG of 3. Metabolic causes have been evaluated in the recent past without obvious contributors. IBD may be contributing.   - Sleep Study Referral; Future    Crohn's disease of both small and large intestine with rectal bleeding (H)  Diagnosed in summer 2023. Working with GI on treatment plan. Worsening symptoms again since stopping prednisone and continuing on Skyrizi alone. Further testing advised by GI to rule out ongoing inflammation and infection. Labs released today.  - C. difficile Toxin B PCR with reflex to C. difficile Antigen and Toxins A/B EIA  - Calprotectin Feces  - Enteric Bacteria and Virus Panel PCR    I spent a total of 48 minutes on the day of the visit.   Time spent by me doing chart review, history and exam,  "documentation and further activities per the note       BMI:   Estimated body mass index is 39.96 kg/m  as calculated from the following:    Height as of this encounter: 1.77 m (5' 9.69\").    Weight as of this encounter: 125.2 kg (276 lb).   Weight management plan: Will revisit in follow up when feeling better.         Follow up 4 weeks after beginning Symbicort or alternative SMART therapy.    Silvia Groves MD  [facility]    Juliet Eugene is a 38 year old, presenting for the following health issues:  Referral, Asthma (Seems to be getting worse, using the inhaler more), and Recheck Medication        1/9/2024     3:54 PM   Additional Questions   Roomed by Lisy ELIZABETH     Diagnosed with Crohn's over the summer. Was started on prednisone and more recently Skyrizi. Since she stopped prednisone a few months ago and has only been on Skyrizi, her loose stools, urgency, and bleeding have been increasing. She is following closely with GI and they are obtaining stool studies to further investigate potential causes of her symptoms.     Has had gradually worsening breathing over the past few months since stopping the prednisone. She is concerned that it might be Skyrizi causing her respiratory increase in symptoms. Wheezing sometimes with exertion and sometimes just at rest. Positional changes don't cause any issues. Has gained back some weight with prednisone but not above previous baseline. No new allergen exposures. Allergies flared in the fall with the moldly leaves.  A lot of congestion and some sneezing. No eye symptoms. Also allergic to cats. Using albuterol 1-3 times per week, which is an increase from her baseline rare use. Using her Fluticasone and cetirizine regularly for allergies. Diagnosed in college with asthma. Was previously following with pulmonology and was on a controller medication 5-6 years ago. Was on Breo Ellipta. Has not needed this since 2018 or 2019. " "    Longstanding fatigue and need for many hours of sleep (12-13 hours per night). Still not rested in the morning after this. Daytime somnolence and would like to nap. Snores. No witnessed apneas. Has not worsened recently with new medications with IBD.        Objective    /85   Pulse 83   Temp 99.3  F (37.4  C)   Resp 20   Ht 1.77 m (5' 9.69\")   Wt 125.2 kg (276 lb)   SpO2 97%   BMI 39.96 kg/m    Body mass index is 39.96 kg/m .  Physical Exam   GENERAL: healthy, alert and no distress  EYES: Eyes grossly normal to inspection, conjunctivae and sclerae normal  HEENT: No nasal or oral lesions. No apparent nasal drainage.  RESP: normal rate and effort, lungs clear to auscultation - no rales, rhonchi or wheezes  CV: regular rate and rhythm, normal S1 S2, no S3 or S4, no murmur, click or rub.          5/27/2022     4:15 PM 4/26/2023     4:17 PM 1/9/2024     3:54 PM   ACT Total Scores   ACT TOTAL SCORE (Goal Greater than or Equal to 20) 22 21 18   In the past 12 months, how many times did you visit the emergency room for your asthma without being admitted to the hospital? 0 0 0   In the past 12 months, how many times were you hospitalized overnight because of your asthma? 0 0 0             "

## 2024-01-09 NOTE — PATIENT INSTRUCTIONS
- Continue the allergy meds  - Start the Symbicort if not too expensive. If there's an issue, send me a [employer]end[employer] message.   - Set up the sleep medicine appointment  - Follow up with me in 4 weeks to recheck breathing. Can be virtual at 4:20 PM.

## 2024-01-16 ENCOUNTER — TELEPHONE (OUTPATIENT)
Dept: GASTROENTEROLOGY | Facility: CLINIC | Age: 39
End: 2024-01-16
Payer: COMMERCIAL

## 2024-01-16 NOTE — TELEPHONE ENCOUNTER
Left Voicemail (2nd Attempt) for the patient to call back and schedule the following:    Appointment type: follow up   Provider: Dr. Ivey  Return date: 4/24/24  Specialty phone number: [phone]  Additional appointment(s) needed:   Additonal Notes:

## 2024-01-17 LAB

## 2024-01-17 PROCEDURE — 87507 IADNA-DNA/RNA PROBE TQ 12-25: CPT | Performed by: FAMILY MEDICINE

## 2024-01-17 PROCEDURE — 87493 C DIFF AMPLIFIED PROBE: CPT | Mod: XU | Performed by: FAMILY MEDICINE

## 2024-01-17 PROCEDURE — 83993 ASSAY FOR CALPROTECTIN FECAL: CPT | Performed by: FAMILY MEDICINE

## 2024-01-19 LAB — CALPROTECTIN STL-MCNT: 25.3 MG/KG (ref 0–49.9)

## 2024-01-22 DIAGNOSIS — J45.30 MILD PERSISTENT ASTHMA WITHOUT COMPLICATION: Primary | ICD-10-CM

## 2024-01-22 RX ORDER — FLUTICASONE PROPIONATE AND SALMETEROL XINAFOATE 115; 21 UG/1; UG/1
2 AEROSOL, METERED RESPIRATORY (INHALATION) 2 TIMES DAILY
Status: CANCELLED | OUTPATIENT
Start: 2024-01-22

## 2024-01-22 RX ORDER — FLUTICASONE PROPIONATE AND SALMETEROL XINAFOATE 115; 21 UG/1; UG/1
2 AEROSOL, METERED RESPIRATORY (INHALATION) 2 TIMES DAILY
COMMUNITY
End: 2024-08-06

## 2024-01-22 NOTE — TELEPHONE ENCOUNTER
Can we please call Valorie to share the following message:    Your insurance doesn't want to cover the use of a single inhaler for daily use and symptom management (aka SMART therapy) that we discussed at your last visit. The other alternatives to Symbicort for SMART therapy are also not covered by your plan.     I'm instead pivoting to the use of an inhaled steroid that should be on your formulary called budesonide. Start taking 2 puffs twice daily of the budesonide no matter what your symptoms. Use the albuterol as needed for symptom relief on top of this. Let's check back about 4-6 weeks after starting this to see how your breathing is doing.     Silvia Groves MD

## 2024-01-23 NOTE — TELEPHONE ENCOUNTER
I called and spoke to pt, however pt is requesting for the new inhaler to be send to [facility] in [place]. Would you please so kindly and send it to [facility]?

## 2024-01-30 ENCOUNTER — VIRTUAL VISIT (OUTPATIENT)
Dept: PSYCHOLOGY | Facility: CLINIC | Age: 39
End: 2024-01-30
Attending: INTERNAL MEDICINE
Payer: COMMERCIAL

## 2024-01-30 DIAGNOSIS — K50.811 CROHN'S DISEASE OF BOTH SMALL AND LARGE INTESTINE WITH RECTAL BLEEDING (H): ICD-10-CM

## 2024-01-30 DIAGNOSIS — F41.1 GENERALIZED ANXIETY DISORDER: Primary | ICD-10-CM

## 2024-01-30 DIAGNOSIS — F32.A DEPRESSION, UNSPECIFIED DEPRESSION TYPE: ICD-10-CM

## 2024-01-30 DIAGNOSIS — K58.9 IRRITABLE BOWEL SYNDROME, UNSPECIFIED TYPE: ICD-10-CM

## 2024-01-30 DIAGNOSIS — F48.9 MENTAL HEALTH PROBLEM: ICD-10-CM

## 2024-01-30 PROCEDURE — 90834 PSYTX W PT 45 MINUTES: CPT | Mod: 95 | Performed by: PSYCHOLOGIST

## 2024-01-30 ASSESSMENT — ANXIETY QUESTIONNAIRES
GAD7 TOTAL SCORE: 5
8. IF YOU CHECKED OFF ANY PROBLEMS, HOW DIFFICULT HAVE THESE MADE IT FOR YOU TO DO YOUR WORK, TAKE CARE OF THINGS AT HOME, OR GET ALONG WITH OTHER PEOPLE?: SOMEWHAT DIFFICULT
3. WORRYING TOO MUCH ABOUT DIFFERENT THINGS: SEVERAL DAYS
7. FEELING AFRAID AS IF SOMETHING AWFUL MIGHT HAPPEN: SEVERAL DAYS
GAD7 TOTAL SCORE: 5
1. FEELING NERVOUS, ANXIOUS, OR ON EDGE: SEVERAL DAYS
5. BEING SO RESTLESS THAT IT IS HARD TO SIT STILL: NOT AT ALL
IF YOU CHECKED OFF ANY PROBLEMS ON THIS QUESTIONNAIRE, HOW DIFFICULT HAVE THESE PROBLEMS MADE IT FOR YOU TO DO YOUR WORK, TAKE CARE OF THINGS AT HOME, OR GET ALONG WITH OTHER PEOPLE: SOMEWHAT DIFFICULT
2. NOT BEING ABLE TO STOP OR CONTROL WORRYING: SEVERAL DAYS
6. BECOMING EASILY ANNOYED OR IRRITABLE: NOT AT ALL
4. TROUBLE RELAXING: SEVERAL DAYS
7. FEELING AFRAID AS IF SOMETHING AWFUL MIGHT HAPPEN: SEVERAL DAYS
GAD7 TOTAL SCORE: 5

## 2024-01-30 ASSESSMENT — PATIENT HEALTH QUESTIONNAIRE - PHQ9
10. IF YOU CHECKED OFF ANY PROBLEMS, HOW DIFFICULT HAVE THESE PROBLEMS MADE IT FOR YOU TO DO YOUR WORK, TAKE CARE OF THINGS AT HOME, OR GET ALONG WITH OTHER PEOPLE: SOMEWHAT DIFFICULT
SUM OF ALL RESPONSES TO PHQ QUESTIONS 1-9: 10
SUM OF ALL RESPONSES TO PHQ QUESTIONS 1-9: 10

## 2024-01-30 NOTE — PROGRESS NOTES
Health Psychology          Rhonda Pierson, Ph.D.,  [phone]  Ramona Jacome, Ph.D.,  [phone]  Roxie Fraire, Ph.D.,  [phone]  Ariela Su, Ph.D., , [facility] [phone]  Lincoln Zimmerman, Ph.D., , [facility] [phone]  Day Zepeda, Ph.D.,  [phone]  Sandra Chang, Ph.D., , [facility] [phone]    [facility], 3rd Floor  [address] [place] [address]       Health Psychology Progress Note    Patient Name: Lou Treviño    Service Type: Individual  Length of Visit: 42 minutes  Start time: 1:00 PM    Stop time: 1:42 PM  Attendees: Patient attended alone  Session #: 1    Telemedicine Information:     Telemedicine Visit: The patient's condition can be safely assessed and treated via synchronous audio and visual telemedicine encounter.    Reason for Telemedicine Visit: Patient has requested telehealth visit and Patient convenience (e.g. access to timely appointments / distance to available provider)  Originating Site (Patient Location): Patient's home, [place]  Distant Location (provider location):  On-site, [facility]  Consent:  The patient/guardian has verbally consented to: the potential risks and benefits of telemedicine (video visit) versus in person care; bill my insurance or make self-payment for services provided; and responsibility for payment of non-covered services.   Mode of Communication:  Video Conference via Soluto    As the provider I attest to compliance with applicable laws and regulations related to telemedicine.     Identifying Information and Presenting Problem:  The patient is a 38 year old adult who is being seen for problematic symptoms of anxiety and depression in the context of a new Crohn's diagnosis.    Topics Discussed/Interventions Provided:    Orientation to Service:  Is patient the family member of an employee who works at this clinic or connected to a patient health psychology  provider is already treating? No  Discussed dual role conflicts? Yes   Discussed privacy and confidentiality, including limits? Yes   Is patient already established with a mental health provider? No    Health Psychology Service Introduction: Discussed the health psychology service. Provided education about role as health psychology provider and model of care. Patient was given the opportunity to ask questions and state her goals/expectations for initiating services with the health psychology provider. Patient is interested in establishing services with the health psychology provider.    Session Content:     Background/Context: Per EMR: Patient is a 38-year-old female with a PMH of prediabetes, elevated BMI, status post right hemicolectomy (for intussusception as an infant) with new diagnosis of Crohn's disease who established care with GI in October 2023. At the visit with Dr. Ivey in October 2023, patient was referred to health psychology for an unspecified mental health concern. She has a longstanding history of depression and anxiety with an onset in adolescence. Patient presents for her first appointment with me today upon recommendation from her gastroenterologist.     Patient reports that her primary concern is adjustment to her new diagnosis of Crohn's diagnosis and trying to balance the demands of managing her medical condition with her other demands. She notes that her GI symptoms were causing a diminished quality of life, which prompted a visit to her PCP, who referred her to GI. GI then diagnosed her with Crohn's in October 2023. She notes that this diagnosis occurred while she was still during the probationary period of a new job. Patient expresses that there was a confluence of stressors that became overwhelming and exacerbated her longstanding anxiety and depression symptoms, which prompted the referral to health psychology at Dr. Ivey's recommendation. She notes that she does have some thoughts of  escape/desire for relief, but denies SI, plans or intent. She declined the offer for crisis resources and notes that she has access to these resources if needed. Patient's overall goals for treatment include learning strategies for changing her perspective/mindset, learning mind-body-behavior strategies, and building a sense of purpose/meaning in the context of living with a chronic illness.     Skills/intervention: The majority of today's session was focused on gathering information, building rapport, and establishing a therapeutic alliance. Supported patient in processing her thoughts and emotions around her new Crohn's diagnosis and the impact it has had on her life. Discussed next steps for treatment and provided psychoeducation about therapeutic orientation and approach to treatment. Introduced ACT therapeutic framework and watched intro to ACT video. Discussed the use of combining ACT and CBT to create a tailored treatment to address patient's needs. Next step will be to complete a diagnostic assessment.    Treatment Objective(s) Addressed in This Session:  Psychological distress related to Chronic Disease Management    Progress on / Status of Treatment Objective(s) / Homework:  New Objective established this session     Medication Adherence: Patient did not report medication changes. Current medication list is below:     Current Outpatient Medications   Medication    budesonide (PULMICORT FLEXHALER) 90 MCG/ACT inhaler    cetirizine (ZYRTEC) 10 MG tablet    citalopram (CELEXA) 20 MG tablet    famotidine (PEPCID) 20 MG tablet    fluticasone (FLONASE) 50 MCG/ACT nasal spray    fluticasone-salmeterol (ADVAIR HFA) 115-21 MCG/ACT inhaler    hydrocortisone (CORTENEMA) 100 MG/60ML enema    levalbuterol (XOPENEX HFA) 45 MCG/ACT inhaler    multivitamin w/minerals (MULTI-VITAMIN) tablet    norethindrone-ethinyl estradiol (ORTHO-NOVUM) 1-35 MG-MCG tablet    propranolol ER (INDERAL LA) 60 MG 24 hr capsule    rimegepant  (NURTEC) 75 MG ODT tablet    Risankizumab-rzaa (SKYRIZI) 360 MG/2.4ML SOCT     No current facility-administered medications for this visit.       Assessment: The patient appeared to be active and engaged in today's session and was receptive to feedback.     Mental Status Exam:   Appearance: Appropriate   Eye Contact: Good    Orientation: Yes, x4  Behavior/relationship to provider/demeanor: Cooperative, Engaged, and Pleasant  Motor Activity: normal or unremarkable  Mood (subjective report): Depressed, Anxious  Affect (objective appearance): Appropriate  Speech Rate: Normal  Speech Volume: Normal  Speech Articulation: Normal  Speech Coherence: Normal  Speech Spontaneity: Normal  Thought Content: no suicidal/homicidal ideation, plans, or intent, endorses some depressive and anxious cognitions  Thought Process (associations): Logical, Linear, and Goal directed  Thought Process (rate): Normal  Abnormal Perception: None  Attention/Concentration: Normal  Memory: Appears grossly intact   Fund of Knowledge: Appears within normal limits  Abstraction:  Normal  Insight:  Good  Judgment:   Good    Does the patient appear to be at imminent risk of harm to self/others at this time? No    The session was necessary to address symptoms of depression and anxiety in the context of adjusting to and managing a chronic health condition.  Ongoing psychotherapy is necessary to provide counseling, improve functioning with daily activities, provide psychoeducation, provide support, and teach mind-body-behavior skills.    Diagnoses:    1. Generalized anxiety disorder    2. Depression, unspecified depression type    3. Mental health problem    4. Irritable bowel syndrome, unspecified type    5. Crohn's disease of both small and large intestine with rectal bleeding (H)        Plan:    Follow-up video visit with me on 2/12/24 at 1:00pm  Patient to use 911 or other crisis resources in the event of a psychiatric emergency  Primary care needs and  medications are managed by Silvia Groves. Referring provider is Aiyana Ivey  Next visit agenda/goals:   Complete diagnostic assessment    NOTE: Treatment plan due in future visit    Sandra Chang, Ph.D., , [facility]  Clinical Health Psychologist  Phone: [phone]   Pager: [phone]  1/30/2024 12:14 PM

## 2024-02-12 ENCOUNTER — TELEPHONE (OUTPATIENT)
Dept: PSYCHOLOGY | Facility: CLINIC | Age: 39
End: 2024-02-12
Payer: COMMERCIAL

## 2024-02-14 ENCOUNTER — VIRTUAL VISIT (OUTPATIENT)
Dept: PSYCHOLOGY | Facility: CLINIC | Age: 39
End: 2024-02-14
Payer: COMMERCIAL

## 2024-02-14 DIAGNOSIS — F32.4 MAJOR DEPRESSIVE DISORDER WITH SINGLE EPISODE, IN PARTIAL REMISSION (H): Primary | ICD-10-CM

## 2024-02-14 DIAGNOSIS — K58.9 IRRITABLE BOWEL SYNDROME, UNSPECIFIED TYPE: ICD-10-CM

## 2024-02-14 DIAGNOSIS — F41.1 GENERALIZED ANXIETY DISORDER: ICD-10-CM

## 2024-02-14 DIAGNOSIS — K50.811 CROHN'S DISEASE OF BOTH SMALL AND LARGE INTESTINE WITH RECTAL BLEEDING (H): ICD-10-CM

## 2024-02-14 PROCEDURE — 90791 PSYCH DIAGNOSTIC EVALUATION: CPT | Mod: 95 | Performed by: PSYCHOLOGIST

## 2024-02-14 ASSESSMENT — ANXIETY QUESTIONNAIRES
1. FEELING NERVOUS, ANXIOUS, OR ON EDGE: SEVERAL DAYS
7. FEELING AFRAID AS IF SOMETHING AWFUL MIGHT HAPPEN: NOT AT ALL
8. IF YOU CHECKED OFF ANY PROBLEMS, HOW DIFFICULT HAVE THESE MADE IT FOR YOU TO DO YOUR WORK, TAKE CARE OF THINGS AT HOME, OR GET ALONG WITH OTHER PEOPLE?: SOMEWHAT DIFFICULT
GAD7 TOTAL SCORE: 3
5. BEING SO RESTLESS THAT IT IS HARD TO SIT STILL: NOT AT ALL
3. WORRYING TOO MUCH ABOUT DIFFERENT THINGS: SEVERAL DAYS
4. TROUBLE RELAXING: NOT AT ALL
2. NOT BEING ABLE TO STOP OR CONTROL WORRYING: SEVERAL DAYS
7. FEELING AFRAID AS IF SOMETHING AWFUL MIGHT HAPPEN: NOT AT ALL
6. BECOMING EASILY ANNOYED OR IRRITABLE: NOT AT ALL
GAD7 TOTAL SCORE: 3
IF YOU CHECKED OFF ANY PROBLEMS ON THIS QUESTIONNAIRE, HOW DIFFICULT HAVE THESE PROBLEMS MADE IT FOR YOU TO DO YOUR WORK, TAKE CARE OF THINGS AT HOME, OR GET ALONG WITH OTHER PEOPLE: SOMEWHAT DIFFICULT
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
## 2024-02-26 ASSESSMENT — ANXIETY QUESTIONNAIRES
GAD7 TOTAL SCORE: 3
GAD7 TOTAL SCORE: 3
7. FEELING AFRAID AS IF SOMETHING AWFUL MIGHT HAPPEN: NOT AT ALL
8. IF YOU CHECKED OFF ANY PROBLEMS, HOW DIFFICULT HAVE THESE MADE IT FOR YOU TO DO YOUR WORK, TAKE CARE OF THINGS AT HOME, OR GET ALONG WITH OTHER PEOPLE?: SOMEWHAT DIFFICULT

## 2024-02-28 ENCOUNTER — VIRTUAL VISIT (OUTPATIENT)
Dept: PSYCHOLOGY | Facility: CLINIC | Age: 39
End: 2024-02-28
Payer: COMMERCIAL

## 2024-02-28 DIAGNOSIS — F32.4 MAJOR DEPRESSIVE DISORDER WITH SINGLE EPISODE, IN PARTIAL REMISSION (H): Primary | ICD-10-CM

## 2024-02-28 DIAGNOSIS — F41.1 GENERALIZED ANXIETY DISORDER: ICD-10-CM

## 2024-02-28 DIAGNOSIS — K58.9 IRRITABLE BOWEL SYNDROME, UNSPECIFIED TYPE: ICD-10-CM

## 2024-02-28 DIAGNOSIS — K50.811 CROHN'S DISEASE OF BOTH SMALL AND LARGE INTESTINE WITH RECTAL BLEEDING (H): ICD-10-CM

## 2024-02-28 PROCEDURE — 90834 PSYTX W PT 45 MINUTES: CPT | Mod: 95 | Performed by: PSYCHOLOGIST

## 2024-02-28 NOTE — PROGRESS NOTES
Health Psychology          Rhonda Pierson, Ph.D.,  [phone]  Ramona Jacome, Ph.D.,  [phone]  Roxie Fraire, Ph.D.,  [phone]  Ariela Su, Ph.D., , [facility] [phone]  Lincoln Zimmerman, Ph.D., , [facility] [phone]  Day Zepeda, Ph.D.,  [phone]  Sandra Chang, Ph.D., , [facility] [phone]    [facility], 3rd Floor  [address] [place] [address]       Health Psychology Progress Note    Patient Name: Lou Treviño    Service Type: Individual  Length of Visit: 47 minutes  Start time: 4:04 PM    Stop time: 4:51 PM   Attendees: Patient attended alone  Session #: 3    Telemedicine Information:     Telemedicine Visit: The patient's condition can be safely assessed and treated via synchronous audio and visual telemedicine encounter.    Reason for Telemedicine Visit: Patient has requested telehealth visit and Patient convenience (e.g. access to timely appointments / distance to available provider)  Originating Site (Patient Location): Patient's home, [place]  Distant Location (provider location):  On-site, [facility]  Consent:  The patient/guardian has verbally consented to: the potential risks and benefits of telemedicine (video visit) versus in person care; bill my insurance or make self-payment for services provided; and responsibility for payment of non-covered services.   Mode of Communication:  Video Conference via [employer]    As the provider I attest to compliance with applicable laws and regulations related to telemedicine.     Identifying Information and Presenting Problem:  The patient is a 38 year old adult who is being seen for problematic symptoms of anxiety and depression in the context of a new Crohn's diagnosis.    Topics Discussed/Interventions Provided:      Session Content:     Update: Patient reports doing fairly well. Notes coping with some work-related stress and having difficulty with  "focusing. States that her GI symptoms have remained stable.     Treatment Planning: Completed treatment plan.                                               Individual Treatment Plan    Patient's Name: Lou Treviño  YOB: 1985    Date of Creation: 02/28/24  Date Treatment Plan Last Reviewed/Revised: 02/28/24    DSM5 Diagnoses:     1. Major depressive disorder with single episode, in partial remission (H24)    2. Generalized anxiety disorder (in partial remission)      Psychosocial / Contextual Factors: health issues, occupational / vocational stress, house-related stress, and financial stress  PROMIS (reviewed every 90 days):      1/30/2024  12:29 PM   PROMIS 10    PROMIS TOTAL - SUBSCORES 21          Referral / Collaboration:  Collaboration with medical providers and specialists as needed .    Anticipated number of session for this episode of care:  10-15  Anticipation frequency of session:  1-2x per month  Anticipated Duration of each session: 38-52 minutes  Treatment plan will be reviewed in 90 days or when goals have been changed.       Measurable Treatment Goal(s) related to diagnosis / functional impairment(s): Patient's overall goals for treatment include learning strategies for changing her perspective/mindset, learning mind-body-behavior strategies, and building a sense of purpose/meaning in the context of living with a chronic illness.  Goal 1: Patient will experience an improvement in her quality of life and a decrease in avoidance-based coping   \"I will know I've met my goal when I am defaulting to those more productive coping strategies and demonstrating the capacity to work toward more longer-term goals.\"     Objective #A (Patient Action)    Patient will experience a decrease in her negative perceptions and outlook as well as decreased \"worse case scenario\" thinking.  Status: New - Date: 02/28/24     Intervention(s)  Therapist will teach defusion and cognitive reappraisal " skills.    Objective #B  Patient will learn and implement mind-body-behavior skills.  Status: New - Date: 02/28/24     Intervention(s)  Therapist will teach physiological arousal reduction strategies, mindfulness, and behavioral pain management skills.    Objective #C  Patient will develop the ability to create and work toward longer-term goals and take the perspective of longer-term decision-making.  Status: New - Date: 02/28/24     Intervention(s)  Therapist will teach values clarification, committed action, and problem-solving skills.    Objective #D  Patient will experience a decrease in avoidance-based coping and an increase in approach-based coping.  Status: New - Date: 02/28/24     Intervention(s)  Therapist will teach committed action, exposure, and problem-solving skills.      Patient has reviewed and agreed to the above plan.      Sandra Chang, PhD  February 28, 2024      Skills/intervention: Provided psychoeducation about ACT and CBT therapeutic frameworks. Introduced ACT principle of values clarification and CBT principle of cognitive distortions. Also briefly introduced diaphragmatic breathing.    Treatment Objective(s) Addressed in This Session:  Psychological distress related to Chronic Disease Management    Progress on / Status of Treatment Objective(s) / Homework:  Stable     Medication Adherence: Patient did not report medication changes. Current medication list is below:     Current Outpatient Medications   Medication    budesonide (PULMICORT FLEXHALER) 90 MCG/ACT inhaler    cetirizine (ZYRTEC) 10 MG tablet    citalopram (CELEXA) 20 MG tablet    famotidine (PEPCID) 20 MG tablet    fluticasone (FLONASE) 50 MCG/ACT nasal spray    fluticasone-salmeterol (ADVAIR HFA) 115-21 MCG/ACT inhaler    hydrocortisone (CORTENEMA) 100 MG/60ML enema    levalbuterol (XOPENEX HFA) 45 MCG/ACT inhaler    multivitamin w/minerals (MULTI-VITAMIN) tablet    norethindrone-ethinyl estradiol (ORTHO-NOVUM) 1-35  MG-MCG tablet    propranolol ER (INDERAL LA) 60 MG 24 hr capsule    rimegepant (NURTEC) 75 MG ODT tablet    Risankizumab-rzaa (SKYRIZI) 360 MG/2.4ML SOCT     No current facility-administered medications for this visit.       Assessment: The patient appeared to be active and engaged in today's session and was receptive to feedback.     Mental Status Exam:   Appearance: Appropriate   Eye Contact: Good    Orientation: Yes, x4  Behavior/relationship to provider/demeanor: Cooperative, Engaged, and Pleasant  Motor Activity: normal or unremarkable  Mood (subjective report): Depressed, Anxious  Affect (objective appearance): Appropriate  Speech Rate: Normal  Speech Volume: Normal  Speech Articulation: Normal  Speech Coherence: Normal  Speech Spontaneity: Normal  Thought Content: no suicidal/homicidal ideation, plans, or intent, endorses some depressive and anxious cognitions  Thought Process (associations): Logical, Linear, and Goal directed  Thought Process (rate): Normal  Abnormal Perception: None  Attention/Concentration: Normal  Memory: Appears grossly intact   Fund of Knowledge: Appears within normal limits  Abstraction:  Normal  Insight:  Good  Judgment:   Good    Does the patient appear to be at imminent risk of harm to self/others at this time? No    The session was necessary to address symptoms of depression and anxiety in the context of adjusting to and managing a chronic health condition.  Ongoing psychotherapy is necessary to provide counseling, improve functioning with daily activities, provide psychoeducation, provide support, and teach mind-body-behavior skills.    Diagnoses:    1. Major depressive disorder with single episode, in partial remission (H24)    2. Generalized anxiety disorder (in partial remission)    3. Irritable bowel syndrome, unspecified type    4. Crohn's disease of both small and large intestine with rectal bleeding (H)       Plan:    Follow-up video visit with me on 4/1/24 at  1:00pm  Patient to use 911 or other crisis resources in the event of a psychiatric emergency  Primary care needs and medications are managed by [facility]. Referring provider is Aiyana Ivey  Next visit agenda/goals:   Complete quick look at values  Practice diaphragmatic breathing  Identify and tally cognitive distortions    NOTE: Treatment plan update due 2/28/25; 90 day treatment plan review due 5/28/24    Sandra Chang, Ph.D., , [facility]  Clinical Health Psychologist  Phone: [phone]   Pager: [phone]

## 2024-03-08 RX ORDER — BISACODYL 5 MG/1
TABLET, DELAYED RELEASE ORAL
Qty: 4 TABLET | Refills: 0 | Status: SHIPPED | OUTPATIENT
Start: 2024-03-08 | End: 2024-05-06

## 2024-03-08 NOTE — TELEPHONE ENCOUNTER
Extended Golytely Bowel Prep . Instructions were sent via [employer]. Bowel prep was sent 3/8/2024 to    [id number] [place], [place] - [address]

## 2024-03-11 ENCOUNTER — TELEPHONE (OUTPATIENT)
Dept: GASTROENTEROLOGY | Facility: CLINIC | Age: 39
End: 2024-03-11
Payer: COMMERCIAL

## 2024-03-11 DIAGNOSIS — R11.0 NAUSEA: ICD-10-CM

## 2024-03-11 DIAGNOSIS — K50.811 CROHN'S DISEASE OF BOTH SMALL AND LARGE INTESTINE WITH RECTAL BLEEDING (H): Primary | ICD-10-CM

## 2024-03-11 NOTE — TELEPHONE ENCOUNTER
Pre visit planning completed.      Procedure details:    Patient scheduled for Colonoscopy  on 3/25/24.     Arrival time: 1130. Procedure time 1230    Pre op exam needed? N/A    Facility location: [facility]; [address] [place] [address], 5th Floor, [place] [address]    Sedation type: MAC    Indication for procedure: crohns      Chart review:     Electronic implanted devices? No    Recent diagnosis of diverticulitis within the last 6 weeks? No    Diabetic? Prediabetic     Diabetic medication HOLDING recommendations: (if applicable)  Oral diabetic medications: No  Diabetic injectables: No  Insulin: No      Medication review:    Anticoagulants? No    NSAIDS? No NSAID medications per patient's medication list.  RN will verify with pre-assessment call.    Other medication HOLDING recommendations:  N/A      Prep for procedure:     Bowel prep recommendation: Extended Golytely. Bowel prep prescription sent to    [id number] [place], [place] - [address]  Due to: BMI > 40.     Prep instructions sent via elio Jackson RN  Endoscopy Procedure Pre Assessment RN  [phone] option 4

## 2024-03-12 RX ORDER — BISACODYL 5 MG/1
TABLET, DELAYED RELEASE ORAL
Qty: 4 TABLET | Refills: 0 | Status: SHIPPED | OUTPATIENT
Start: 2024-03-12 | End: 2024-05-06

## 2024-03-12 RX ORDER — ONDANSETRON 4 MG/1
TABLET, FILM COATED ORAL
Qty: 3 TABLET | Refills: 0 | Status: SHIPPED | OUTPATIENT
Start: 2024-03-12 | End: 2024-05-06

## 2024-03-12 NOTE — TELEPHONE ENCOUNTER
Pre assessment completed for upcoming procedure.      Procedure details:    Patient scheduled for Colonoscopy  on 3/25/24.     Arrival time: 1130. Procedure time 1230     Pre op exam needed? N/A     Facility location: [facility]; [address] [place] [address], 5[address] Floor, [place] [address]     Sedation type: MAC    Designated  policy reviewed. Instructed to have someone stay 24 hours post procedure.       Prep for procedure:     Reviewed procedure prep instructions.     Patient verbalized understanding and had no questions or concerns at this time.        Deanna Kim RN  Endoscopy Procedure Pre Assessment RN  [phone] option 4

## 2024-03-18 ENCOUNTER — VIRTUAL VISIT (OUTPATIENT)
Dept: GASTROENTEROLOGY | Facility: CLINIC | Age: 39
End: 2024-03-18
Attending: INTERNAL MEDICINE
Payer: COMMERCIAL

## 2024-03-18 DIAGNOSIS — K50.818 CROHN'S DISEASE OF BOTH SMALL AND LARGE INTESTINE WITH OTHER COMPLICATION (H): Primary | ICD-10-CM

## 2024-03-18 RX ORDER — RISANKIZUMAB-RZAA 360 MG/2.4
360 WEARABLE INJECTOR SUBCUTANEOUS
Qty: 2.4 ML | Refills: 2 | Status: SHIPPED | OUTPATIENT
Start: 2024-03-18 | End: 2024-08-19

## 2024-03-18 NOTE — Clinical Note
"    3/18/2024         RE: Lou Treviño  [address]  [place] [address]        Dear Colleague,    Thank you for referring your patient, Lou Treviño, to the [facility]. Please see a copy of my visit note below.    Medication Therapy Management (MTM) Encounter    ASSESSMENT:                            Medication Adherence/Access: No issues identified    ***:  ***      PLAN:                            Continue with Skyrizi every     Follow-up: {followuptest2:990210}    SUBJECTIVE/OBJECTIVE:                          Valorie Treviño is a 38 year old female called for a follow-up visit.    Flory HerronD was on the call today as well with permission from Valorie.    Reason for visit: Skyrizi check-in    Allergies/ADRs: Reviewed in chart  Tobacco: She reports that she has never smoked. She has never been exposed to tobacco smoke. She has never used smokeless tobacco.  Alcohol: not currently using    Medication Adherence/Access: no issues reported    Crohn's:   Skyrizi 360 mg every 8 weeks  Cortenemas (discontinue)    Scheduled for a colonoscopy on Monday, 3/25. Notes a lot of improvement. Every once in awhile, she is feeling food triggering symptoms. Notes her  described her bowel habits are \"normal.\" Asthma is not the same as it was, but manageable. Notes she has been taking more acetaminophen because of hand pain. Notes the hand pain started with the loading doses.    Last provider visit:  Next provider visit: 4/2/2024 with Dr. Ivey  Last labs completed:  Lab frequency:    - standing labs available 12/2023  Next labs due:     Last IBD Health Maintenance Review:  PDC: 81% (PDC)    ----------------  {PATRICK?:102939}    I spent 22 minutes with this patient today. { :[id number]}. A copy of the visit note was provided to the patient's provider(s).    A summary of these recommendations {GIVEN/NOT GIVEN:970502}.    Crista HerronD, BCACP  MTM Pharmacist   [facility] " "Gastroenterology   Phone: [phone]    Telemedicine Visit Details  Type of service:  Telephone visit  Start Time:  2:01 PM  End Time: 2:23 PM     Medication Therapy Recommendations  No medication therapy recommendations to display       Medication Therapy Management (MTM) Encounter    ASSESSMENT:                            Medication Adherence/Access: No issues identified    Crohn's: Valorie would benefit from continuing Skyrizi maintenance dosing every eight weeks pending colonoscopy results. Her fecal calprotectin has normalized, which is reassuring, as is her clinical response. We discussed varying levels of achieving remission. Reviewed that endoscopic evaluation at six months is reasonable and appropriate. She is due for labs this month, and she will try to coordinate these with her procedure.    PLAN:                            Continue with Skyrizi 360 mg every eight weeks. Refilled today.  Colonoscopy next week with Dr. Ivey as scheduled, with labs.    Follow-up:    -- 3-6 months for MTM    SUBJECTIVE/OBJECTIVE:                          Valorie Treviño is a 38 year old female called for a follow-up visit.    Flory HerronD was on the call today as well with permission from Valorie.    Reason for visit: Skyrizi check-in    Allergies/ADRs: Reviewed in chart  Tobacco: She reports that she has never smoked. She has never been exposed to tobacco smoke. She has never used smokeless tobacco.  Alcohol: not currently using    Medication Adherence/Access: no issues reported    Crohn's:   Skyrizi 360 mg subcutaneously every 8 weeks  Cortenemas (discontinued)  Double Prep + ondansetron (for upcoming procedure)    Scheduled for a colonoscopy on Monday, 3/25. Notes a lot of improvement. Every once in awhile, she is feeling food triggering symptoms but that is happening less frequently compared to previously. Notes her  described her bowel habits are \"normal.\" Asthma is not the same as it was, but manageable. " Notes she has been taking more acetaminophen because of hand pain. Notes the hand pain started with the loading doses and she is wondering if this is from Skyrizi. States other family members have arthritis, so it's also possible this is just from aging/genetics. Denies concerns with filling Stelara.    Lab Results   Component Value Date    CALPRF 25.3 01/17/2024      Last provider visit: 10/31/2023 with Dr. Ivey  Next provider visit: 4/2/2024 with Dr. Ivey  Last labs completed: 12/2023  Lab frequency: every 3 months   - standing labs available until 12/2024  Next labs due: March 2024  PDC: 81% (PDC)    ----------------    I spent 22 minutes with this patient today. All changes were made via collaborative practice agreement with Aiyana Ivey. A copy of the visit note was provided to the patient's provider(s).    A summary of these recommendations was sent via [employer].    Crista Fuentes PharmD, BCACP  MTM Pharmacist   [facility] Gastroenterology   Phone: [phone]    Telemedicine Visit Details  Type of service:  Telephone visit  Start Time:  2:01 PM  End Time: 2:23 PM     Medication Therapy Recommendations  No medication therapy recommendations to display         Again, thank you for allowing me to participate in the care of your patient.        Sincerely,        Crista Fuentes [facility]

## 2024-03-18 NOTE — PROGRESS NOTES
"Medication Therapy Management (MTM) Encounter    ASSESSMENT:                            Medication Adherence/Access: No issues identified    Crohn's:   Valorie would benefit from continuing Skyrizi maintenance dosing every eight weeks pending colonoscopy results. Her fecal calprotectin has normalized, which is reassuring, as is her clinical response. We discussed varying levels of achieving remission. Reviewed that endoscopic evaluation at six months is reasonable and appropriate. She is due for labs this month, and she will try to coordinate these with her procedure. We discussed relevant data regarding joint pain and Skyrizi. It has been reported in those on Skyrizi, but also has a significant presence in the placebo group, drawing to question if this is disease related or due to patient specific factors.    PLAN:                            Continue with Skyrizi 360 mg every eight weeks. Refilled today.  Colonoscopy next week with Dr. Ivey as scheduled, with labs.    Follow-up:    -- 3-6 months for MTM    SUBJECTIVE/OBJECTIVE:                          Valorie Treviño is a 38 year old female called for a follow-up visit.    Flory HerronD was on the call today as well with permission from Valorie.    Reason for visit: Skyrizi check-in    Allergies/ADRs: Reviewed in chart  Tobacco: She reports that she has never smoked. She has never been exposed to tobacco smoke. She has never used smokeless tobacco.  Alcohol: not currently using    Medication Adherence/Access: no issues reported    Crohn's:   Skyrizi 360 mg subcutaneously every 8 weeks  Cortenemas (discontinued)  Double Prep + ondansetron (for upcoming procedure)    Scheduled for a colonoscopy on Monday, 3/25. Notes a lot of improvement. Every once in awhile, she is feeling food triggering symptoms but that is happening less frequently compared to previously. Notes her  described her bowel habits are \"normal.\" Asthma is not the same as it was, but manageable. " Notes she has been taking more acetaminophen because of hand pain. Notes the hand pain started with the loading doses and she is wondering if this is from Skyrizi. States other family members have arthritis, so it's also possible this is just from aging/genetics. Denies concerns with filling Stelara.    Lab Results   Component Value Date    CALPRF 25.3 01/17/2024      Last provider visit: 10/31/2023 with Dr. Ivey  Next provider visit: 4/2/2024 with Dr. Ivey  Last labs completed: 12/2023  Lab frequency: every 3 months   - standing labs available until 12/2024  Next labs due: March 2024  PDC: 81% (PDC)    ----------------    I spent 22 minutes with this patient today. All changes were made via collaborative practice agreement with Aiyana Ivey. A copy of the visit note was provided to the patient's provider(s).    A summary of these recommendations was sent via [employer].    Crista Fuentes PharmD, BCACP  MTM Pharmacist   [facility] Gastroenterology   Phone: [phone]    Telemedicine Visit Details  Type of service:  Telephone visit  Start Time:  2:01 PM  End Time: 2:23 PM     Medication Therapy Recommendations  No medication therapy recommendations to display

## 2024-03-19 NOTE — PATIENT INSTRUCTIONS
"Recommendations from today's MTM visit:                                                       Continue with Skyrizi 360 mg every eight weeks. Refilled today.  Colonoscopy next week with Dr. Ivey as scheduled, with labs.    Follow-up:    -- 3-6 months for MTM    It was great speaking with you today.  I value your experience and would be very thankful for your time in providing feedback in our clinic survey. In the next few days, you may receive an email or text message from [facility] [employer] with a link to a survey related to your  clinical pharmacist.\"     To schedule another MTM appointment, please call the clinic directly or you may call the MTM scheduling line at [phone].    My Clinical Pharmacist's contact information:                                                      Please feel free to contact me with any questions or concerns you have.      Crista HerronD, BCACP  MTM Pharmacist   [facility] Gastroenterology   Phone: [phone]    "

## 2024-03-22 ENCOUNTER — ANESTHESIA EVENT (OUTPATIENT)
Dept: SURGERY | Facility: AMBULATORY SURGERY CENTER | Age: 39
End: 2024-03-22
Payer: COMMERCIAL

## 2024-03-25 ENCOUNTER — ANESTHESIA (OUTPATIENT)
Dept: SURGERY | Facility: AMBULATORY SURGERY CENTER | Age: 39
End: 2024-03-25
Payer: COMMERCIAL

## 2024-03-25 ENCOUNTER — LAB (OUTPATIENT)
Dept: LAB | Facility: CLINIC | Age: 39
End: 2024-03-25
Payer: COMMERCIAL

## 2024-03-25 ENCOUNTER — HOSPITAL ENCOUNTER (OUTPATIENT)
Facility: AMBULATORY SURGERY CENTER | Age: 39
Discharge: HOME | End: 2024-03-25
Attending: INTERNAL MEDICINE
Payer: COMMERCIAL

## 2024-03-25 VITALS
HEART RATE: 66 BPM | BODY MASS INDEX: 40.73 KG/M2 | SYSTOLIC BLOOD PRESSURE: 111 MMHG | WEIGHT: 275 LBS | HEIGHT: 69 IN | DIASTOLIC BLOOD PRESSURE: 78 MMHG | RESPIRATION RATE: 14 BRPM | OXYGEN SATURATION: 99 % | TEMPERATURE: 97 F

## 2024-03-25 VITALS — HEART RATE: 66 BPM

## 2024-03-25 DIAGNOSIS — K50.811 CROHN'S DISEASE OF BOTH SMALL AND LARGE INTESTINE WITH RECTAL BLEEDING (H): ICD-10-CM

## 2024-03-25 LAB
ALBUMIN SERPL BCG-MCNC: 4.1 G/DL (ref 3.5–5.2)
ALP SERPL-CCNC: 68 U/L (ref 40–150)
ALT SERPL W P-5'-P-CCNC: 29 U/L (ref 0–50)
ANION GAP SERPL CALCULATED.3IONS-SCNC: 13 MMOL/L (ref 7–15)
AST SERPL W P-5'-P-CCNC: 43 U/L (ref 0–45)
BASOPHILS # BLD AUTO: 0.1 10E3/UL (ref 0–0.2)
BASOPHILS NFR BLD AUTO: 1 %
BILIRUB SERPL-MCNC: 0.5 MG/DL
BUN SERPL-MCNC: 5.6 MG/DL (ref 6–20)
CALCIUM SERPL-MCNC: 9 MG/DL (ref 8.6–10)
CHLORIDE SERPL-SCNC: 101 MMOL/L (ref 98–107)
COLONOSCOPY: NORMAL
CREAT SERPL-MCNC: 0.78 MG/DL (ref 0.51–0.95)
CRP SERPL-MCNC: 21.7 MG/L
DEPRECATED HCO3 PLAS-SCNC: 24 MMOL/L (ref 22–29)
EGFRCR SERPLBLD CKD-EPI 2021: >90 ML/MIN/1.73M2
EOSINOPHIL # BLD AUTO: 0.2 10E3/UL (ref 0–0.7)
EOSINOPHIL NFR BLD AUTO: 3 %
ERYTHROCYTE [DISTWIDTH] IN BLOOD BY AUTOMATED COUNT: 12.4 % (ref 10–15)
GLUCOSE SERPL-MCNC: 119 MG/DL (ref 70–99)
HCG UR QL: NEGATIVE
HCT VFR BLD AUTO: 39.5 % (ref 35–47)
HGB BLD-MCNC: 13.1 G/DL (ref 11.7–15.7)
IMM GRANULOCYTES # BLD: 0 10E3/UL
IMM GRANULOCYTES NFR BLD: 0 %
INTERNAL QC OK POCT: NORMAL
LYMPHOCYTES # BLD AUTO: 3 10E3/UL (ref 0.8–5.3)
LYMPHOCYTES NFR BLD AUTO: 36 %
MCH RBC QN AUTO: 27.5 PG (ref 26.5–33)
MCHC RBC AUTO-ENTMCNC: 33.2 G/DL (ref 31.5–36.5)
MCV RBC AUTO: 83 FL (ref 78–100)
MONOCYTES # BLD AUTO: 0.6 10E3/UL (ref 0–1.3)
MONOCYTES NFR BLD AUTO: 7 %
NEUTROPHILS # BLD AUTO: 4.3 10E3/UL (ref 1.6–8.3)
NEUTROPHILS NFR BLD AUTO: 53 %
NRBC # BLD AUTO: 0 10E3/UL
NRBC BLD AUTO-RTO: 0 /100
PLATELET # BLD AUTO: 330 10E3/UL (ref 150–450)
POCT KIT EXPIRATION DATE: NORMAL
POCT KIT LOT NUMBER: NORMAL
POTASSIUM SERPL-SCNC: 4.3 MMOL/L (ref 3.4–5.3)
PROT SERPL-MCNC: 8 G/DL (ref 6.4–8.3)
RBC # BLD AUTO: 4.76 10E6/UL (ref 3.8–5.2)
SODIUM SERPL-SCNC: 138 MMOL/L (ref 135–145)
WBC # BLD AUTO: 8.1 10E3/UL (ref 4–11)

## 2024-03-25 PROCEDURE — 36415 COLL VENOUS BLD VENIPUNCTURE: CPT | Performed by: PATHOLOGY

## 2024-03-25 PROCEDURE — 45378 DIAGNOSTIC COLONOSCOPY: CPT | Performed by: NURSE ANESTHETIST, CERTIFIED REGISTERED

## 2024-03-25 PROCEDURE — 86140 C-REACTIVE PROTEIN: CPT | Performed by: PATHOLOGY

## 2024-03-25 PROCEDURE — 80053 COMPREHEN METABOLIC PANEL: CPT | Performed by: PATHOLOGY

## 2024-03-25 PROCEDURE — 88305 TISSUE EXAM BY PATHOLOGIST: CPT | Mod: TC | Performed by: INTERNAL MEDICINE

## 2024-03-25 PROCEDURE — 45380 COLONOSCOPY AND BIOPSY: CPT | Performed by: INTERNAL MEDICINE

## 2024-03-25 PROCEDURE — 85025 COMPLETE CBC W/AUTO DIFF WBC: CPT | Performed by: PATHOLOGY

## 2024-03-25 PROCEDURE — 81025 URINE PREGNANCY TEST: CPT | Performed by: PATHOLOGY

## 2024-03-25 PROCEDURE — 88305 TISSUE EXAM BY PATHOLOGIST: CPT | Mod: 26 | Performed by: PATHOLOGY

## 2024-03-25 RX ORDER — OXYCODONE HYDROCHLORIDE 5 MG/1
10 TABLET ORAL
Status: DISCONTINUED | OUTPATIENT
Start: 2024-03-25 | End: 2024-03-26 | Stop reason: HOSPADM

## 2024-03-25 RX ORDER — SODIUM CHLORIDE, SODIUM LACTATE, POTASSIUM CHLORIDE, CALCIUM CHLORIDE 600; 310; 30; 20 MG/100ML; MG/100ML; MG/100ML; MG/100ML
INJECTION, SOLUTION INTRAVENOUS CONTINUOUS PRN
Status: DISCONTINUED | OUTPATIENT
Start: 2024-03-25 | End: 2024-03-25

## 2024-03-25 RX ORDER — SODIUM CHLORIDE, SODIUM LACTATE, POTASSIUM CHLORIDE, CALCIUM CHLORIDE 600; 310; 30; 20 MG/100ML; MG/100ML; MG/100ML; MG/100ML
INJECTION, SOLUTION INTRAVENOUS CONTINUOUS
Status: DISCONTINUED | OUTPATIENT
Start: 2024-03-25 | End: 2024-03-26 | Stop reason: HOSPADM

## 2024-03-25 RX ORDER — OXYCODONE HYDROCHLORIDE 5 MG/1
5 TABLET ORAL
Status: DISCONTINUED | OUTPATIENT
Start: 2024-03-25 | End: 2024-03-26 | Stop reason: HOSPADM

## 2024-03-25 RX ORDER — ONDANSETRON 2 MG/ML
4 INJECTION INTRAMUSCULAR; INTRAVENOUS EVERY 30 MIN PRN
Status: DISCONTINUED | OUTPATIENT
Start: 2024-03-25 | End: 2024-03-26 | Stop reason: HOSPADM

## 2024-03-25 RX ORDER — ONDANSETRON 4 MG/1
4 TABLET, ORALLY DISINTEGRATING ORAL EVERY 30 MIN PRN
Status: DISCONTINUED | OUTPATIENT
Start: 2024-03-25 | End: 2024-03-26 | Stop reason: HOSPADM

## 2024-03-25 RX ORDER — ACETAMINOPHEN 325 MG/1
975 TABLET ORAL ONCE
Status: DISCONTINUED | OUTPATIENT
Start: 2024-03-25 | End: 2024-03-26 | Stop reason: HOSPADM

## 2024-03-25 RX ORDER — LIDOCAINE 40 MG/G
CREAM TOPICAL
Status: DISCONTINUED | OUTPATIENT
Start: 2024-03-25 | End: 2024-03-26 | Stop reason: HOSPADM

## 2024-03-25 RX ORDER — PROPOFOL 10 MG/ML
INJECTION, EMULSION INTRAVENOUS PRN
Status: DISCONTINUED | OUTPATIENT
Start: 2024-03-25 | End: 2024-03-25

## 2024-03-25 RX ORDER — NALOXONE HYDROCHLORIDE 0.4 MG/ML
0.1 INJECTION, SOLUTION INTRAMUSCULAR; INTRAVENOUS; SUBCUTANEOUS
Status: DISCONTINUED | OUTPATIENT
Start: 2024-03-25 | End: 2024-03-26 | Stop reason: HOSPADM

## 2024-03-25 RX ORDER — ONDANSETRON 2 MG/ML
4 INJECTION INTRAMUSCULAR; INTRAVENOUS
Status: DISCONTINUED | OUTPATIENT
Start: 2024-03-25 | End: 2024-03-26 | Stop reason: HOSPADM

## 2024-03-25 RX ORDER — LIDOCAINE HYDROCHLORIDE 20 MG/ML
INJECTION, SOLUTION INFILTRATION; PERINEURAL PRN
Status: DISCONTINUED | OUTPATIENT
Start: 2024-03-25 | End: 2024-03-25

## 2024-03-25 RX ORDER — PROPOFOL 10 MG/ML
INJECTION, EMULSION INTRAVENOUS CONTINUOUS PRN
Status: DISCONTINUED | OUTPATIENT
Start: 2024-03-25 | End: 2024-03-25

## 2024-03-25 RX ADMIN — LIDOCAINE HYDROCHLORIDE 20 MG: 20 INJECTION, SOLUTION INFILTRATION; PERINEURAL at 13:13

## 2024-03-25 RX ADMIN — PROPOFOL 70 MG: 10 INJECTION, EMULSION INTRAVENOUS at 13:08

## 2024-03-25 RX ADMIN — SODIUM CHLORIDE, SODIUM LACTATE, POTASSIUM CHLORIDE, CALCIUM CHLORIDE: 600; 310; 30; 20 INJECTION, SOLUTION INTRAVENOUS at 13:04

## 2024-03-25 RX ADMIN — PROPOFOL 150 MCG/KG/MIN: 10 INJECTION, EMULSION INTRAVENOUS at 13:08

## 2024-03-25 RX ADMIN — LIDOCAINE HYDROCHLORIDE 80 MG: 20 INJECTION, SOLUTION INFILTRATION; PERINEURAL at 13:08

## 2024-03-25 RX ADMIN — SODIUM CHLORIDE, SODIUM LACTATE, POTASSIUM CHLORIDE, CALCIUM CHLORIDE: 600; 310; 30; 20 INJECTION, SOLUTION INTRAVENOUS at 11:49

## 2024-03-25 ASSESSMENT — LIFESTYLE VARIABLES: TOBACCO_USE: 0

## 2024-03-25 ASSESSMENT — COPD QUESTIONNAIRES: COPD: 0

## 2024-03-25 ASSESSMENT — ENCOUNTER SYMPTOMS: ORTHOPNEA: 0

## 2024-03-25 NOTE — ANESTHESIA PREPROCEDURE EVALUATION
Anesthesia Pre-Procedure Evaluation    Patient: Lou Treviño   MRN: [id number] : 1985        Procedure : Procedure(s):  Colonoscopy          Past Medical History:   Diagnosis Date    Migraine     Noninfectious ileitis     Uncomplicated asthma       Past Surgical History:   Procedure Laterality Date    COLONOSCOPY N/A 8/3/2023    Procedure: COLONOSCOPY WITH BIOPSIES;  Surgeon: Zoraida Jamil MD;  Location: [facility] OR    INTUSSUSCEPTION REPAIR      Hemicolectomy, removal of 11 cm of proximal and distal colon      Allergies   Allergen Reactions    Codeine       Social History     Tobacco Use    Smoking status: Never     Passive exposure: Never    Smokeless tobacco: Never   Substance Use Topics    Alcohol use: Not Currently     Comment: Rare      Wt Readings from Last 1 Encounters:   24 125.2 kg (276 lb)        Anesthesia Evaluation   Pt has had prior anesthetic. Type: General.    No history of anesthetic complications       ROS/MED HX  ENT/Pulmonary:     (+)                     Mild Persistent, asthma (Exercise induced. Has not needed rescue inhaler for weeks.)  Treatment: Inhaler prn,              (-) tobacco use, COPD and recent URI   Neurologic:     (+)      migraines,                          Cardiovascular:  - neg cardiovascular ROS  (-) BEARDEN, orthopnea/PND and syncope   METS/Exercise Tolerance: >4 METS Comment: Goes up a flight of stairs at home without stopping. Denies concerning cardiopulmonary symptoms.    Hematologic:  - neg hematologic  ROS     Musculoskeletal:  - neg musculoskeletal ROS     GI/Hepatic:     (+) GERD (Occasional symptoms, none in last several days),                   Renal/Genitourinary:  - neg Renal ROS     Endo:     (+)               Obesity (BMI 41),       Psychiatric/Substance Use:       Infectious Disease:  - neg infectious disease ROS     Malignancy:       Other:            Physical Exam    Airway  airway exam normal      Mallampati: I       Respiratory Devices and  "Support         Dental       (+) Minor Abnormalities - some fillings, tiny chips      Cardiovascular   cardiovascular exam normal          Pulmonary   pulmonary exam normal                OUTSIDE LABS:  CBC:   Lab Results   Component Value Date    WBC 8.3 12/20/2023    WBC 11.8 (H) 09/18/2023    HGB 12.8 12/20/2023    HGB 13.4 09/18/2023    HCT 41.0 12/20/2023    HCT 41.1 09/18/2023     12/20/2023     09/18/2023     BMP:   Lab Results   Component Value Date     12/20/2023     09/18/2023    POTASSIUM 3.8 12/20/2023    POTASSIUM 3.5 09/18/2023    CHLORIDE 105 12/20/2023    CHLORIDE 104 09/18/2023    CO2 24 12/20/2023    CO2 20 (L) 09/18/2023    BUN 8.0 12/20/2023    BUN 11.8 09/18/2023    CR 0.65 12/20/2023    CR 0.68 09/18/2023     (H) 12/20/2023     (H) 09/18/2023     COAGS: No results found for: \"PTT\", \"INR\", \"FIBR\"  POC:   Lab Results   Component Value Date    HCG Negative 08/03/2023     HEPATIC:   Lab Results   Component Value Date    ALBUMIN 4.0 12/20/2023    PROTTOTAL 7.3 12/20/2023    ALT 41 12/20/2023    AST 50 (H) 12/20/2023    ALKPHOS 63 12/20/2023    BILITOTAL 0.2 12/20/2023     OTHER:   Lab Results   Component Value Date    A1C 6.3 (H) 07/17/2023    JENNIFER 9.1 12/20/2023    MAG 1.9 10/16/2023    LIPASE 31 06/10/2022    TSH 2.76 07/14/2023    SED 21 (H) 12/20/2023       Anesthesia Plan    ASA Status:  3    NPO Status:  NPO Appropriate    Anesthesia Type: MAC.     - Reason for MAC: straight local not clinically adequate   Induction: Intravenous.   Maintenance: TIVA.        Consents    Anesthesia Plan(s) and associated risks, benefits, and realistic alternatives discussed. Questions answered and patient/representative(s) expressed understanding.     - Discussed: Risks, Benefits and Alternatives for BOTH SEDATION and the PROCEDURE were discussed     - Discussed with:  Patient            Postoperative Care       PONV prophylaxis: Ondansetron (or other 5HT-3), Background " Propofol Infusion     Comments:               Cash Rasheed MD    I have reviewed the pertinent notes and labs in the chart from the past 30 days and (re)examined the patient.  Any updates or changes from those notes are reflected in this note.

## 2024-03-25 NOTE — ANESTHESIA CARE TRANSFER NOTE
Patient: Lou Treviño    Procedure: Procedure(s):  Colonoscopy with biopsy       Diagnosis: Crohn's disease of both small and large intestine with other complication (H) [K50.818]  Diagnosis Additional Information: No value filed.    Anesthesia Type:   MAC     Note:      Level of Consciousness: awake  Oxygen Supplementation: room air    Independent Airway: airway patency satisfactory and stable        Patient transferred to: Phase II    Handoff Report: Identifed the Patient, Identified the Reponsible Provider, Reviewed the pertinent medical history, Discussed the surgical course, Reviewed Intra-OP anesthesia mangement and issues during anesthesia, Set expectations for post-procedure period and Allowed opportunity for questions and acknowledgement of understanding  Vitals:  Vitals Value Taken Time   /64 03/25/24 1329   Temp 36.1  C (97  F) 03/25/24 1329   Pulse 73 03/25/24 1329   Resp 16 03/25/24 1329   SpO2 99 % 03/25/24 1329       Electronically Signed By: ANDRES Quintanilla CRNA  March 25, 2024  1:30 PM

## 2024-03-25 NOTE — ANESTHESIA POSTPROCEDURE EVALUATION
Patient: Lou Treviño    Procedure: Procedure(s):  Colonoscopy with biopsy       Anesthesia Type:  MAC    Note:  Disposition: Outpatient   Postop Pain Control: Uneventful            Sign Out: Well controlled pain   PONV: No   Neuro/Psych: Uneventful            Sign Out: Acceptable/Baseline neuro status   Airway/Respiratory: Uneventful            Sign Out: Acceptable/Baseline resp. status   CV/Hemodynamics: Uneventful            Sign Out: Acceptable CV status; No obvious hypovolemia; No obvious fluid overload   Other NRE: NONE   DID A NON-ROUTINE EVENT OCCUR?            Last vitals:  Vitals Value Taken Time   /78 03/25/24 1400   Temp 36.1  C (97  F) 03/25/24 1400   Pulse 66 03/25/24 1400   Resp 14 03/25/24 1400   SpO2 99 % 03/25/24 1400       Electronically Signed By: Cash Rasheed MD  March 25, 2024  3:00 PM

## 2024-03-25 NOTE — H&P
Lou Treviño  [id number]  female  38 year old      Reason for procedure/surgery: Crohn's disease    Patient Active Problem List   Diagnosis    Mild persistent asthma    Migraines    IBS (irritable bowel syndrome)    Anxiety and depression    Allergies    History of intussusception    Family history of factor V deficiency    Generalized anxiety disorder    Healthcare maintenance    Prediabetes    Morbid obesity (H)    Nausea    Hypoalbuminemia    Hepatic steatosis    Left knee pain    Elevated fecal calprotectin    Crohn's disease of both small and large intestine with rectal bleeding (H)       Past Surgical History:    Past Surgical History:   Procedure Laterality Date    COLONOSCOPY N/A 8/3/2023    Procedure: COLONOSCOPY WITH BIOPSIES;  Surgeon: Zoraida Jamil MD;  Location: [facility] OR    INTUSSUSCEPTION REPAIR  1986    Hemicolectomy, removal of 11 cm of proximal and distal colon       Past Medical History:   Past Medical History:   Diagnosis Date    Migraine     Noninfectious ileitis     Uncomplicated asthma        Social History:   Social History     Tobacco Use    Smoking status: Never     Passive exposure: Never    Smokeless tobacco: Never   Substance Use Topics    Alcohol use: Not Currently     Comment: Rare       Family History:   Family History   Problem Relation Age of Onset    Pulmonary Embolism Mother         In 2013 at age 63 years, Factor V deficiency, indefinite anticoagulation    Asthma Mother     Migraines Mother     Depression Mother     Depression Sister     Cancer Maternal Grandmother     Cancer Paternal Grandmother     Cerebrovascular Disease Paternal Grandfather     Glaucoma Maternal Great-Grandmother        Allergies:   Allergies   Allergen Reactions    Codeine        Active Medications:   Current Outpatient Medications   Medication Sig Dispense Refill    budesonide (PULMICORT FLEXHALER) 90 MCG/ACT inhaler Inhale 2 puffs into the lungs 2 times daily 1 each 3    cetirizine (ZYRTEC) 10 MG  tablet Take 1 tablet (10 mg) by mouth daily 90 tablet 1    citalopram (CELEXA) 20 MG tablet Take 1 tablet (20 mg) by mouth daily 90 tablet 1    fluticasone (FLONASE) 50 MCG/ACT nasal spray Spray 1 spray into both nostrils daily 18.2 mL 0    fluticasone-salmeterol (ADVAIR HFA) 115-21 MCG/ACT inhaler Inhale 2 puffs into the lungs 2 times daily      levalbuterol (XOPENEX HFA) 45 MCG/ACT inhaler Inhale 1-2 puffs into the lungs every 6 hours as needed for wheezing 15 g 1    norethindrone-ethinyl estradiol (ORTHO-NOVUM) 1-35 MG-MCG tablet Take 1 tablet by mouth daily 84 tablet 3    propranolol ER (INDERAL LA) 60 MG 24 hr capsule Take 1 capsule (60 mg) by mouth daily 90 capsule 3    bisacodyl (DULCOLAX) 5 MG EC tablet Two days prior to exam take two (2) tablets at 4pm. One day prior to exam take two (2) tablets at 4pm 4 tablet 0    bisacodyl (DULCOLAX) 5 MG EC tablet Two days prior to exam take two (2) tablets at 4pm. One day prior to exam take two (2) tablets at 4pm 4 tablet 0    famotidine (PEPCID) 20 MG tablet Take 1 tablet (20 mg) by mouth 2 times daily 180 tablet 0    multivitamin w/minerals (MULTI-VITAMIN) tablet Take 1 tablet by mouth daily      ondansetron (ZOFRAN) 4 MG tablet Take one tablet every six hours for nausea during colonoscopy bowel prepping 3 tablet 0    polyethylene glycol (GOLYTELY) 236 g suspension Take as directed. Two days before your exam fill the first container with water. Cover and shake until mixed well. At 5:00pm drink one 8oz glass every 10-15 minutes until half (1/2) of the first container is empty. Store the remainder in the refrigerator. One day before your exam at 5:00pm drink the second half of the first container until it is gone. Before you go to bed mix the second container with water and put in refrigerator. Six hours before your check in time drink one 8oz glass every 10-15 minutes until half of container is empty. Discard the remainder of solution. 8000 mL 0    polyethylene glycol  "(GOLYTELY) 236 g suspension Take as directed. Two days before your exam fill the first container with water. Cover and shake until mixed well. At 5:00pm drink one 8oz glass every 10-15 minutes until half (1/2) of the first container is empty. Store the remainder in the refrigerator. One day before your exam at 5:00pm drink the second half of the first container until it is gone. Before you go to bed mix the second container with water and put in refrigerator. Six hours before your check in time drink one 8oz glass every 10-15 minutes until half of container is empty. Discard the remainder of solution. 8000 mL 0    rimegepant (NURTEC) 75 MG ODT tablet Take 1 tablet (75 mg) by mouth every 48 hours (Patient not taking: Reported on 9/18/2023) 45 tablet 1    Risankizumab-rzaa (SKYRIZI) 360 MG/2.4ML SOCT Inject 2.4 mLs (360 mg) Subcutaneous once every eight weeks 2.4 mL 2       Systemic Review:   CONSTITUTIONAL: NEGATIVE for fever, chills, change in weight  ENT/MOUTH: NEGATIVE for ear, mouth and throat problems  RESP: NEGATIVE for significant cough or SOB  CV: NEGATIVE for chest pain, palpitations or peripheral edema    Physical Examination:   Vital Signs: /78 (BP Location: Right arm)   Pulse 72   Temp 97.2  F (36.2  C) (Temporal)   Resp 18   Ht 1.753 m (5' 9\")   Wt 124.7 kg (275 lb)   SpO2 96%   BMI 40.61 kg/m    GENERAL: healthy, alert and no distress  NECK: no adenopathy, no asymmetry, masses, or scars  RESP: lungs clear to auscultation - no rales, rhonchi or wheezes  CV: regular rate and rhythm, normal S1 S2, no S3 or S4, no murmur, click or rub, no peripheral edema and peripheral pulses strong  ABDOMEN: soft, nontender, no hepatosplenomegaly, no masses and bowel sounds normal  MS: no gross musculoskeletal defects noted, no edema    Plan: Appropriate to proceed as scheduled.      Aiyana Ivey MD  3/25/2024    PCP:  [facility]    "

## 2024-03-26 LAB
PATH REPORT.COMMENTS IMP SPEC: NORMAL
PATH REPORT.COMMENTS IMP SPEC: NORMAL
PATH REPORT.FINAL DX SPEC: NORMAL
PATH REPORT.GROSS SPEC: NORMAL
PATH REPORT.MICROSCOPIC SPEC OTHER STN: NORMAL
PATH REPORT.RELEVANT HX SPEC: NORMAL
PHOTO IMAGE: NORMAL

## 2024-03-31 ASSESSMENT — ANXIETY QUESTIONNAIRES
2. NOT BEING ABLE TO STOP OR CONTROL WORRYING: SEVERAL DAYS
3. WORRYING TOO MUCH ABOUT DIFFERENT THINGS: SEVERAL DAYS
IF YOU CHECKED OFF ANY PROBLEMS ON THIS QUESTIONNAIRE, HOW DIFFICULT HAVE THESE PROBLEMS MADE IT FOR YOU TO DO YOUR WORK, TAKE CARE OF THINGS AT HOME, OR GET ALONG WITH OTHER PEOPLE: SOMEWHAT DIFFICULT
1. FEELING NERVOUS, ANXIOUS, OR ON EDGE: SEVERAL DAYS
GAD7 TOTAL SCORE: 4
GAD7 TOTAL SCORE: 4
7. FEELING AFRAID AS IF SOMETHING AWFUL MIGHT HAPPEN: SEVERAL DAYS
7. FEELING AFRAID AS IF SOMETHING AWFUL MIGHT HAPPEN: SEVERAL DAYS
4. TROUBLE RELAXING: NOT AT ALL
6. BECOMING EASILY ANNOYED OR IRRITABLE: NOT AT ALL
8. IF YOU CHECKED OFF ANY PROBLEMS, HOW DIFFICULT HAVE THESE MADE IT FOR YOU TO DO YOUR WORK, TAKE CARE OF THINGS AT HOME, OR GET ALONG WITH OTHER PEOPLE?: SOMEWHAT DIFFICULT
5. BEING SO RESTLESS THAT IT IS HARD TO SIT STILL: NOT AT ALL

## 2024-04-01 ENCOUNTER — VIRTUAL VISIT (OUTPATIENT)
Dept: PSYCHOLOGY | Facility: CLINIC | Age: 39
End: 2024-04-01
Payer: COMMERCIAL

## 2024-04-01 DIAGNOSIS — F32.4 MAJOR DEPRESSIVE DISORDER WITH SINGLE EPISODE, IN PARTIAL REMISSION (H): Primary | ICD-10-CM

## 2024-04-01 DIAGNOSIS — F41.1 GENERALIZED ANXIETY DISORDER: ICD-10-CM

## 2024-04-01 DIAGNOSIS — K50.811 CROHN'S DISEASE OF BOTH SMALL AND LARGE INTESTINE WITH RECTAL BLEEDING (H): ICD-10-CM

## 2024-04-01 DIAGNOSIS — K58.9 IRRITABLE BOWEL SYNDROME, UNSPECIFIED TYPE: ICD-10-CM

## 2024-04-01 PROCEDURE — 90832 PSYTX W PT 30 MINUTES: CPT | Mod: 95 | Performed by: PSYCHOLOGIST

## 2024-04-01 NOTE — PROGRESS NOTES
Health Psychology          Rhonda Pierson, Ph.D.,  [phone]  Ramona Jacome, Ph.D.,  [phone]  Roxie Fraire, Ph.D.,  [phone]  Ariela Su, Ph.D., , [facility] [phone]  Lincoln Zimmerman, Ph.D., , [facility] [phone]  Day Zepeda, Ph.D.,  [phone]  Sandra Chang, Ph.D., , [facility] [phone]    [facility], 3rd Floor  [address] [place] [address]       Health Psychology Progress Note    Patient Name: Lou Treviño    Service Type: Individual  Length of Visit: 32 minutes  Start time: 1:01 PM    Stop time: 1:33 PM    Attendees: Patient attended alone  Session #: 4    Telemedicine Information:     Telemedicine Visit: The patient's condition can be safely assessed and treated via synchronous audio and visual telemedicine encounter.    Reason for Telemedicine Visit: Patient has requested telehealth visit and Patient convenience (e.g. access to timely appointments / distance to available provider)  Originating Site (Patient Location): Patient's home, [place]  Distant Location (provider location):  Off-site: Provider's Home Office  Consent:  The patient/guardian has verbally consented to: the potential risks and benefits of telemedicine (video visit) versus in person care; bill my insurance or make self-payment for services provided; and responsibility for payment of non-covered services.   Mode of Communication:  Video Conference via [employer]    As the provider I attest to compliance with applicable laws and regulations related to telemedicine.     Identifying Information and Presenting Problem:  The patient is a 38 year old adult who is being seen for problematic symptoms of anxiety and depression in the context of a new Crohn's diagnosis.    Topics Discussed/Interventions Provided:      Session Content:     Update: Patient reports doing fairly well. Had a colonoscopy and results were reassuring. GI symptoms have remained  "stable. Reports that she is feeling a bit under the weather today. Mutually agreed to a shorter visit to support patient's wellness needs.     Homework Review: Patient completed her homework assignments. Top 6 values identified included: trust, humor, safety, curiosity, acceptance, and respect. She also reported that she practiced the diaphragmatic breathing exercise. Discussed insights and assisted patient in troubleshooting challenges and barriers associated with this practice. She notes that it helped with extracting from \"the worry loop.\" Patient also reported that she increased her awareness of cognitive distortions, but did not document them.      Skills/intervention: Introduced CBT principle of cognitive restructuring and cognitive reappraisal. Taught 1 cognitive restructuring strategy (Socratic questioning) for patient to practice unhooking and reconstructing unhelpful thinking patterns. Also provided psychoeducation about the distinction among values goals and behaviors and discussed the ACT framework of living in alignment with values. Patient was successfully able to generate an example of values-aligned living/action.     Treatment Objective(s) Addressed in This Session:  Psychological distress related to Chronic Disease Management    Progress on / Status of Treatment Objective(s) / Homework:  Stable     Medication Adherence: Patient did not report medication changes. Current medication list is below:     Current Outpatient Medications   Medication    bisacodyl (DULCOLAX) 5 MG EC tablet    bisacodyl (DULCOLAX) 5 MG EC tablet    budesonide (PULMICORT FLEXHALER) 90 MCG/ACT inhaler    cetirizine (ZYRTEC) 10 MG tablet    citalopram (CELEXA) 20 MG tablet    famotidine (PEPCID) 20 MG tablet    fluticasone (FLONASE) 50 MCG/ACT nasal spray    fluticasone-salmeterol (ADVAIR HFA) 115-21 MCG/ACT inhaler    levalbuterol (XOPENEX HFA) 45 MCG/ACT inhaler    multivitamin w/minerals (MULTI-VITAMIN) tablet    " norethindrone-ethinyl estradiol (ORTHO-NOVUM) 1-35 MG-MCG tablet    ondansetron (ZOFRAN) 4 MG tablet    polyethylene glycol (GOLYTELY) 236 g suspension    polyethylene glycol (GOLYTELY) 236 g suspension    propranolol ER (INDERAL LA) 60 MG 24 hr capsule    rimegepant (NURTEC) 75 MG ODT tablet    Risankizumab-rzaa (SKYRIZI) 360 MG/2.4ML SOCT     No current facility-administered medications for this visit.       Assessment: The patient appeared to be active and engaged in today's session and was receptive to feedback.     Mental Status Exam:   Appearance: Appropriate   Eye Contact: Good    Orientation: Yes, x4  Behavior/relationship to provider/demeanor: Cooperative, Engaged, and Pleasant  Motor Activity: normal or unremarkable  Mood (subjective report): Depressed, Anxious  Affect (objective appearance): Appropriate  Speech Rate: Normal  Speech Volume: Normal  Speech Articulation: Normal  Speech Coherence: Normal  Speech Spontaneity: Normal  Thought Content: no suicidal/homicidal ideation, plans, or intent, endorses some depressive and anxious cognitions  Thought Process (associations): Logical, Linear, and Goal directed  Thought Process (rate): Normal  Abnormal Perception: None  Attention/Concentration: Normal  Memory: Appears grossly intact   Fund of Knowledge: Appears within normal limits  Abstraction:  Normal  Insight:  Good  Judgment:   Good    Does the patient appear to be at imminent risk of harm to self/others at this time? No    The session was necessary to address symptoms of depression and anxiety in the context of adjusting to and managing a chronic health condition.  Ongoing psychotherapy is necessary to provide counseling, improve functioning with daily activities, provide psychoeducation, provide support, and teach mind-body-behavior skills.    Diagnoses:    1. Major depressive disorder with single episode, in partial remission (H24)    2. Generalized anxiety disorder (in partial remission)    3.  Irritable bowel syndrome, unspecified type    4. Crohn's disease of both small and large intestine with rectal bleeding (H)       Plan:    Follow-up video visit with me on 5/8/24 at 1:00pm  Patient to use 911 or other crisis resources in the event of a psychiatric emergency  Primary care needs and medications are managed by Sil[facility] Groves. Referring provider is Aiyana Ivey  Next visit agenda/goals:   Continue to practice diaphragmatic breathing  Try the Socratic Questioning technique to practice cognitive restructuring    Skills taught/interventions used thus far:  Psychoeducation  Diaphragmatic breathing  Values clarification  Awareness of cognitive distortions  Cognitive restructuring (Socratic questioning)    NOTE: Treatment plan update due 2/28/25; 90 day treatment plan review due 5/28/24                                              Individual Treatment Plan    Patient's Name: Lou Treviño  YOB: 1985    Date of Creation: 02/28/24  Date Treatment Plan Last Reviewed/Revised: 02/28/24    DSM5 Diagnoses:     1. Major depressive disorder with single episode, in partial remission (H24)    2. Generalized anxiety disorder (in partial remission)      Psychosocial / Contextual Factors: health issues, occupational / vocational stress, house-related stress, and financial stress  PROMIS (reviewed every 90 days):      1/30/2024  12:29 PM   PROMIS 10    PROMIS TOTAL - SUBSCORES 21          Referral / Collaboration:  Collaboration with medical providers and specialists as needed.    Anticipated number of session for this episode of care: 10-15  Anticipation frequency of session: 1-2x per month  Anticipated Duration of each session: 38-52 minutes  Treatment plan will be reviewed in 90 days or when goals have been changed.       Measurable Treatment Goal(s) related to diagnosis / functional impairment(s): Patient's overall goals for treatment include learning strategies for changing her  "perspective/mindset, learning mind-body-behavior strategies, and building a sense of purpose/meaning in the context of living with a chronic illness.  Goal 1: Patient will experience an improvement in her quality of life and a decrease in avoidance-based coping   \"I will know I've met my goal when I am defaulting to those more productive coping strategies and demonstrating the capacity to work toward more longer-term goals.\"     Objective #A (Patient Action)    Patient will experience a decrease in her negative perceptions and outlook as well as decreased \"worse case scenario\" thinking.  Status: New - Date: 02/28/24     Intervention(s)  Therapist will teach defusion and cognitive reappraisal skills.    Objective #B  Patient will learn and implement mind-body-behavior skills.  Status: New - Date: 02/28/24     Intervention(s)  Therapist will teach physiological arousal reduction strategies, mindfulness, and behavioral pain management skills.    Objective #C  Patient will develop the ability to create and work toward longer-term goals and take the perspective of longer-term decision-making.  Status: New - Date: 02/28/24     Intervention(s)  Therapist will teach values clarification, committed action, and problem-solving skills.    Objective #D  Patient will experience a decrease in avoidance-based coping and an increase in approach-based coping.  Status: New - Date: 02/28/24     Intervention(s)  Therapist will teach committed action, exposure, and problem-solving skills.      Patient has reviewed and agreed to the above plan.      Sandra Chang, PhD  February 28, 2024    Sandra Chang, Ph.D., , [facility]  Clinical Health Psychologist  Phone: [phone]   Pager: [phone]         "

## 2024-04-02 ENCOUNTER — VIRTUAL VISIT (OUTPATIENT)
Dept: GASTROENTEROLOGY | Facility: CLINIC | Age: 39
End: 2024-04-02
Payer: COMMERCIAL

## 2024-04-02 DIAGNOSIS — K50.80 CROHN'S DISEASE OF BOTH SMALL AND LARGE INTESTINE WITHOUT COMPLICATION (H): Primary | ICD-10-CM

## 2024-04-02 PROCEDURE — 99215 OFFICE O/P EST HI 40 MIN: CPT | Mod: 95 | Performed by: INTERNAL MEDICINE

## 2024-04-02 NOTE — LETTER
4/2/2024         RE: Lou Treviño  [address]  [place] [address]        Dear Colleague,    Thank you for referring your patient, Lou Treviño, to the [facility] [place]. Please see a copy of my visit note below.    Virtual Visit Details    Type of service:  Video Visit     Originating Location (pt. Location): Home    Distant Location (provider location):  On-site  Platform used for Video Visit: [facility]    GASTROENTEROLOGY IBD  OUTPATIENT CLINIC FOLLOW-UP    DATE OF SERVICE: 4/2/2024  PROVIDER REQUESTING CONSULT: Referred Self  Reason for Follow-Up: Crohn's disease     ASSESSMENT:  38-year-old female with prediabetes, elevated BMI, status post right hemicolectomy (for intussusception as an infant) with new diagnosis of Crohn's disease (8/2023) initiated on Skyrizi, now in deep remission.    # Crohn's disease, now in deep remission  # Colonoscopy Rutgeert's i1 ; Simple Endoscopic Score for Crohn's 0  Initially diagnosed on colonoscopy 8/2023 for persistent diarrhea. Treated with Skyrizzi (9/2023) and had slow steroid taper, for which she is now off. She continues to do well on Q8 week Skyrizi in terms of symptoms, as well as recent colonoscopy with evidence of deep remission endoscopically and histologically.     RECOMMENDATIONS:  -- continue Skyrizzi Q8 weeks   -- Labs every 3 months.   -- continue to work with [facility] re: healthcare maintenance  -- plan for next colonoscopy in 8 years (2032)     RTC 6 months with ISELA and 12 months with Dr. Ivey    This patient was seen and discussed with Dr. Ivey.     Silvana Lovell MD  PGY-3  Internal Medicine      ________________________________________________________________  HPI:  Initial history:   37 yr female with prediabetes, elevated BMI, status post right hemicolectomy (for intussusception as an infant) new diagnosis of Crohn's disease who presented to the clinic to establish care.     Pt reports that she had  surgery for intususception when she was 3 months old, this involved a right hemicolectomy with a ileotransverse anastomosis.  Since then patient has soft stool about 1-2 times daily.    Last year she had a 2-month long episode of diarrhea (increased frequency of stool) associated with abdominal cramps.  She was seen by her primary care provider who prescribed Pepcid.  Her symptoms seem to have abated after 2 months.  That episode was attributed to likely a sequela of her prior colon surgery.    More recently late July to early August she started having diarrhea which she describes as multiple bowel movements throughout the day this was associated with urgency and tenesmus.  In addition she reports seeing blood in the stool intermittently.  This led to further work-up which involved testing for enteric panel and C. difficile which were negative.  Subsequently she was referred to GI, was seen by Dr. Zoraida Jamil who did a colonoscopy on her that showed evidence of Crohn's disease.  Patient was then started on prednisone 40 mg with a plan to taper 10 mg every week.    She is currently on prednisone 20 mg.  She reports that while she was on prednisone 30 mg she had good control of symptoms but within two days of reducing dose to 20 mg she started to experience symptoms again.  With regards to side effects while she was on prednisone, she reports increased appetite.     She denies any family history of inflammatory bowel disease or colon cancer.  No family history of any other autoimmune diseases.  She does report that her mother has a pulmonary embolism which was idiopathic and is on lifelong anticoagulation.  At one point of time patient was also tested for hypercoagulability disorders and reportedly the work-up was negative..    Prior to the diagnosis of Crohn's she was using ibuprofen about 1-2 times per week for relief of headaches.  She also reports joint pains predominantly in the hands and shoulder region along  with back pain.  She denies any redness or swelling of the joints and reported that the joint pains did not respond to prednisone.    Patient denies any history of smoking or alcohol.  She works as a  and is mostly involved with IT work.  She has no plans for having kids.    Interval history 10/2023  Had first Skyrizi infusion on 9/18, second on 10/16.   Tried to taper off prednisone after first infusion but had difficulty. Retrying after second infusion. Currently going down to 15 mg starting today.     Having some mild urgency.   Having a BM once every few hours. No nocturnal BMs, which was a problem in the past.   No blood per rectum.     Interval history 4/2024  Things are going well overall. She's reassured by her recent colonoscopy.   She thinks her BMs are more formed, typically 3 per day (improved from prior). Non-bloody. Occasionally has noctural awakening that she thinks is diet related; occurs ~ 1x/week. Intermittent mild urgency, which is improving. No tenesmus.   Has a URI currently that unfortunately started yesterday.  No unintentional weight loss; working on diet. No N/V, abdominal pain.   She notices dietary triggers like fried foods that she has been trying to cut out.   Off of steroids now, has been off since around November.       PMHx:  Past Medical History:   Diagnosis Date    Migraine     Noninfectious ileitis     Uncomplicated asthma      Patient Active Problem List   Diagnosis    Mild persistent asthma    Migraines    IBS (irritable bowel syndrome)    Anxiety and depression    Allergies    History of intussusception    Family history of factor V deficiency    Generalized anxiety disorder    Healthcare maintenance    Prediabetes    Morbid obesity (H)    Nausea    Hypoalbuminemia    Hepatic steatosis    Left knee pain    Elevated fecal calprotectin    Crohn's disease of both small and large intestine with rectal bleeding (H)       PSurgHx:  Past Surgical History:   Procedure  Laterality Date    COLONOSCOPY N/A 8/3/2023    Procedure: COLONOSCOPY WITH BIOPSIES;  Surgeon: Zoraida Jamil MD;  Location: [facility] OR    COLONOSCOPY N/A 3/25/2024    Procedure: Colonoscopy with biopsy;  Surgeon: Aiyana Ivey MD;  Location: [facility] OR    INTUSSUSCEPTION REPAIR  1986    Hemicolectomy, removal of 11 cm of proximal and distal colon       MEDS:  Current Outpatient Medications   Medication Sig Dispense Refill    bisacodyl (DULCOLAX) 5 MG EC tablet Two days prior to exam take two (2) tablets at 4pm. One day prior to exam take two (2) tablets at 4pm 4 tablet 0    bisacodyl (DULCOLAX) 5 MG EC tablet Two days prior to exam take two (2) tablets at 4pm. One day prior to exam take two (2) tablets at 4pm 4 tablet 0    budesonide (PULMICORT FLEXHALER) 90 MCG/ACT inhaler Inhale 2 puffs into the lungs 2 times daily 1 each 3    cetirizine (ZYRTEC) 10 MG tablet Take 1 tablet (10 mg) by mouth daily 90 tablet 1    citalopram (CELEXA) 20 MG tablet Take 1 tablet (20 mg) by mouth daily 90 tablet 1    famotidine (PEPCID) 20 MG tablet Take 1 tablet (20 mg) by mouth 2 times daily 180 tablet 0    fluticasone (FLONASE) 50 MCG/ACT nasal spray Spray 1 spray into both nostrils daily 18.2 mL 0    fluticasone-salmeterol (ADVAIR HFA) 115-21 MCG/ACT inhaler Inhale 2 puffs into the lungs 2 times daily      levalbuterol (XOPENEX HFA) 45 MCG/ACT inhaler Inhale 1-2 puffs into the lungs every 6 hours as needed for wheezing 15 g 1    multivitamin w/minerals (MULTI-VITAMIN) tablet Take 1 tablet by mouth daily      norethindrone-ethinyl estradiol (ORTHO-NOVUM) 1-35 MG-MCG tablet Take 1 tablet by mouth daily 84 tablet 3    ondansetron (ZOFRAN) 4 MG tablet Take one tablet every six hours for nausea during colonoscopy bowel prepping 3 tablet 0    polyethylene glycol (GOLYTELY) 236 g suspension Take as directed. Two days before your exam fill the first container with water. Cover and shake until mixed well. At 5:00pm drink one 8oz glass every  10-15 minutes until half (1/2) of the first container is empty. Store the remainder in the refrigerator. One day before your exam at 5:00pm drink the second half of the first container until it is gone. Before you go to bed mix the second container with water and put in refrigerator. Six hours before your check in time drink one 8oz glass every 10-15 minutes until half of container is empty. Discard the remainder of solution. 8000 mL 0    polyethylene glycol (GOLYTELY) 236 g suspension Take as directed. Two days before your exam fill the first container with water. Cover and shake until mixed well. At 5:00pm drink one 8oz glass every 10-15 minutes until half (1/2) of the first container is empty. Store the remainder in the refrigerator. One day before your exam at 5:00pm drink the second half of the first container until it is gone. Before you go to bed mix the second container with water and put in refrigerator. Six hours before your check in time drink one 8oz glass every 10-15 minutes until half of container is empty. Discard the remainder of solution. 8000 mL 0    propranolol ER (INDERAL LA) 60 MG 24 hr capsule Take 1 capsule (60 mg) by mouth daily 90 capsule 3    rimegepant (NURTEC) 75 MG ODT tablet Take 1 tablet (75 mg) by mouth every 48 hours (Patient not taking: Reported on 9/18/2023) 45 tablet 1    Risankizumab-rzaa (SKYRIZI) 360 MG/2.4ML SOCT Inject 2.4 mLs (360 mg) Subcutaneous once every eight weeks 2.4 mL 2     No current facility-administered medications for this visit.     ALLERGIES:    Allergies   Allergen Reactions    Codeine      FHx:  Family History   Problem Relation Age of Onset    Pulmonary Embolism Mother         In 2013 at age 63 years, Factor V deficiency, indefinite anticoagulation    Asthma Mother     Migraines Mother     Depression Mother     Depression Sister     Cancer Maternal Grandmother     Cancer Paternal Grandmother     Cerebrovascular Disease Paternal Grandfather     Glaucoma  Maternal Great-Grandmother        SOCIAL Hx:  Social History     Socioeconomic History    Marital status: Single     Spouse name: Not on file    Number of children: Not on file    Years of education: Not on file    Highest education level: Not on file   Occupational History    Not on file   Tobacco Use    Smoking status: Never     Passive exposure: Never    Smokeless tobacco: Never   Substance and Sexual Activity    Alcohol use: Not Currently     Comment: Rare    Drug use: Never    Sexual activity: Yes     Partners: Male     Birth control/protection: Pill   Other Topics Concern    Not on file   Social History Narrative    6/21/2021 Employed as an  at [employer]. Still working from home but anticipating a return to the office. Male partner. Has a cat.      Social Determinants of Health     Financial Resource Strain: Not on file   Food Insecurity: Not on file   Transportation Needs: Not on file   Physical Activity: Not on file   Stress: Not on file   Social Connections: Not on file   Interpersonal Safety: Not on file   Housing Stability: Not on file       ROS: A comprehensive Review of Systems was asked and answered in the negative unless specifically commented upon in the HPI    Physical Exam  There were no vitals taken for this visit.  There is no height or weight on file to calculate BMI.  Limited exam [facility] visit   Gen:Pt in no acute distress  HEENT: no scleral icterus.  Lungs: Non labored breathing  Skin: no jaundice  Neuro: Alert and oriented x 3     LABS:  CBC RESULTS:   Recent Labs   Lab Test 03/25/24  1103   WBC 8.1   RBC 4.76   HGB 13.1   HCT 39.5   MCV 83   MCH 27.5   MCHC 33.2   RDW 12.4       Last Comprehensive Metabolic Panel:  Lab Results   Component Value Date     03/25/2024    POTASSIUM 4.3 03/25/2024    CHLORIDE 101 03/25/2024    CO2 24 03/25/2024    ANIONGAP 13 03/25/2024     (H) 03/25/2024    BUN 5.6 (L) 03/25/2024    CR 0.78 03/25/2024    GFRESTIMATED >90  03/25/2024    JENNIFER 9.0 03/25/2024        CRP Inflammation   Date Value Ref Range Status   03/25/2024 21.70 (H) <5.00 mg/L Final      IMAGING/PROCEDURES  Colonoscopy 3/25/2024  Findings:       The perianal and digital rectal examinations were normal.        The diamond-terminal ileum appeared normal.        Single small ulcer at the ileocolonic anastomosis, consistent with        Rutgeert's i1. The Simple Endoscopic Score for Crohn's Disease was        determined based on the endoscopic appearance of the mucosa in the        following segments:        - Ileum: Findings include no ulcers present, no ulcerated surfaces, no        affected surfaces and no narrowings. Segment score: 0.        - Right Colon: Findings include surgically absent. Segment score: 0.        - Transverse Colon: Findings include no ulcers present, no ulcerated        surfaces, no affected surfaces and no narrowings. Segment score: 0.        - Left Colon: Findings include no ulcers present, no ulcerated surfaces,        no affected surfaces and no narrowings. Segment score: 0.        - Rectum: Findings include no ulcers present, no ulcerated surfaces, no        affected surfaces and no narrowings. Segment score: 0.        - Total SES-CD aggregate score: 0. Biopsies were taken with a cold        forceps for histology.                                                                                    Impression:            - Rutgeert's i1. Simple Endoscopic Score for Crohn's                          Disease: 0, mucosal inflammatory changes secondary to                          Crohn's disease, in remission. Biopsied.                          - The examined portion of the diamond-terminal ileum was                          normal.   Recommendation:        - Discharge patient to home (ambulatory).                          - Resume previous diet.                          - Continue present medications.                          - Await pathology results.                           - The findings and recommendations were discussed with                          the patient.     Final Diagnosis   A. TRANSVERSE COLON, BIOPSIES:  - Colonic mucosa with no significant histologic abnormality  - Negative for acute inflammation, signs of chronicity and dysplasia     B. DESCENDING COLON, BIOPSIES:  - Colonic mucosa with crypt architectural distortion and left-sided Paneth cell metaplasia; compatible with site of prior injury  - Negative for acute inflammation and dysplasia     C. SIGMOID COLON,BIOPSIES:  - Colonic mucosa with crypt architectural distortion and left-sided Paneth cell metaplasia; compatible with site of prior injury  - Negative for acute inflammation and dysplasia     D.  RECTUM, BIOPSIES:  - Colorectal mucosa with minimal crypt architectural distortion and focal Paneth cell metaplasia; compatible with site of prior injury  - Negative for acute inflammation and dysplasia                                                 Attestation signed by Aiyana Ivey MD at 4/16/2024 10:41 AM:  I was present for the key and critical portions of the video visit and discussed the management with Dr. Lovell on 4/16/2024 and the patient. I reviewed the note and there are no changes to the past medical, family or social history.  A complete 10 point review of systems was obtained. Please see the HPI for pertinent positives and negatives. All other systems were reviewed and were found to be negative.     I agree with the documented findings and plan of care as outlined.        Again, thank you for allowing me to participate in the care of your patient.      Sincerely,    Aiyana Ivey MD

## 2024-04-02 NOTE — PROGRESS NOTES
GASTROENTEROLOGY IBD  OUTPATIENT CLINIC FOLLOW-UP    DATE OF SERVICE: 4/2/2024  PROVIDER REQUESTING CONSULT: Referred Self  Reason for Follow-Up: Crohn's disease     ASSESSMENT:  38-year-old female with prediabetes, elevated BMI, status post right hemicolectomy (for intussusception as an infant) with new diagnosis of Crohn's disease (8/2023) initiated on Skyrizi, now in deep remission.    # Crohn's disease, now in deep remission  # Colonoscopy Rutgeert's i1 ; Simple Endoscopic Score for Crohn's 0  Initially diagnosed on colonoscopy 8/2023 for persistent diarrhea. Treated with Skyrizzi (9/2023) and had slow steroid taper, for which she is now off. She continues to do well on Q8 week Skyrizi in terms of symptoms, as well as recent colonoscopy with evidence of deep remission endoscopically and histologically.     RECOMMENDATIONS:  -- continue Skyrizzi Q8 weeks   -- Labs every 3 months.   -- continue to work with [facility] pharmacy re: healthcare maintenance  -- plan for next colonoscopy in 8 years (2032)     RTC 6 months with ISELA and 12 months with Dr. Ivey    This patient was seen and discussed with Dr. Ivey.     Silvana Lovell MD  PGY-3  Internal Medicine      ________________________________________________________________  HPI:  Initial history:   37 yr female with prediabetes, elevated BMI, status post right hemicolectomy (for intussusception as an infant) new diagnosis of Crohn's disease who presented to the clinic to establish care.     Pt reports that she had surgery for intususception when she was 3 months old, this involved a right hemicolectomy with a ileotransverse anastomosis.  Since then patient has soft stool about 1-2 times daily.    Last year she had a 2-month long episode of diarrhea (increased frequency of stool) associated with abdominal cramps.  She was seen by her primary care provider who prescribed Pepcid.  Her symptoms seem to have abated after 2 months.  That episode was attributed to likely a  sequela of her prior colon surgery.    More recently late July to early August she started having diarrhea which she describes as multiple bowel movements throughout the day this was associated with urgency and tenesmus.  In addition she reports seeing blood in the stool intermittently.  This led to further work-up which involved testing for enteric panel and C. difficile which were negative.  Subsequently she was referred to GI, was seen by Dr. Zoraida Jamil who did a colonoscopy on her that showed evidence of Crohn's disease.  Patient was then started on prednisone 40 mg with a plan to taper 10 mg every week.    She is currently on prednisone 20 mg.  She reports that while she was on prednisone 30 mg she had good control of symptoms but within two days of reducing dose to 20 mg she started to experience symptoms again.  With regards to side effects while she was on prednisone, she reports increased appetite.     She denies any family history of inflammatory bowel disease or colon cancer.  No family history of any other autoimmune diseases.  She does report that her mother has a pulmonary embolism which was idiopathic and is on lifelong anticoagulation.  At one point of time patient was also tested for hypercoagulability disorders and reportedly the work-up was negative..    Prior to the diagnosis of Crohn's she was using ibuprofen about 1-2 times per week for relief of headaches.  She also reports joint pains predominantly in the hands and shoulder region along with back pain.  She denies any redness or swelling of the joints and reported that the joint pains did not respond to prednisone.    Patient denies any history of smoking or alcohol.  She works as a  and is mostly involved with IT work.  She has no plans for having kids.    Interval history 10/2023  Had first Skyrizi infusion on 9/18, second on 10/16.   Tried to taper off prednisone after first infusion but had difficulty. Retrying after  second infusion. Currently going down to 15 mg starting today.     Having some mild urgency.   Having a BM once every few hours. No nocturnal BMs, which was a problem in the past.   No blood per rectum.     Interval history 4/2024  Things are going well overall. She's reassured by her recent colonoscopy.   She thinks her BMs are more formed, typically 3 per day (improved from prior). Non-bloody. Occasionally has noctural awakening that she thinks is diet related; occurs ~ 1x/week. Intermittent mild urgency, which is improving. No tenesmus.   Has a URI currently that unfortunately started yesterday.  No unintentional weight loss; working on diet. No N/V, abdominal pain.   She notices dietary triggers like fried foods that she has been trying to cut out.   Off of steroids now, has been off since around November.       PMHx:  Past Medical History:   Diagnosis Date    Migraine     Noninfectious ileitis     Uncomplicated asthma      Patient Active Problem List   Diagnosis    Mild persistent asthma    Migraines    IBS (irritable bowel syndrome)    Anxiety and depression    Allergies    History of intussusception    Family history of factor V deficiency    Generalized anxiety disorder    Healthcare maintenance    Prediabetes    Morbid obesity (H)    Nausea    Hypoalbuminemia    Hepatic steatosis    Left knee pain    Elevated fecal calprotectin    Crohn's disease of both small and large intestine with rectal bleeding (H)       PSurgHx:  Past Surgical History:   Procedure Laterality Date    COLONOSCOPY N/A 8/3/2023    Procedure: COLONOSCOPY WITH BIOPSIES;  Surgeon: Zoraida Jamil MD;  Location: [facility] OR    COLONOSCOPY N/A 3/25/2024    Procedure: Colonoscopy with biopsy;  Surgeon: Aiyana Ivey MD;  Location: [facility] OR    INTUSSUSCEPTION REPAIR  1986    Hemicolectomy, removal of 11 cm of proximal and distal colon       MEDS:  Current Outpatient Medications   Medication Sig Dispense Refill    bisacodyl (DULCOLAX) 5 MG EC  tablet Two days prior to exam take two (2) tablets at 4pm. One day prior to exam take two (2) tablets at 4pm 4 tablet 0    bisacodyl (DULCOLAX) 5 MG EC tablet Two days prior to exam take two (2) tablets at 4pm. One day prior to exam take two (2) tablets at 4pm 4 tablet 0    budesonide (PULMICORT FLEXHALER) 90 MCG/ACT inhaler Inhale 2 puffs into the lungs 2 times daily 1 each 3    cetirizine (ZYRTEC) 10 MG tablet Take 1 tablet (10 mg) by mouth daily 90 tablet 1    citalopram (CELEXA) 20 MG tablet Take 1 tablet (20 mg) by mouth daily 90 tablet 1    famotidine (PEPCID) 20 MG tablet Take 1 tablet (20 mg) by mouth 2 times daily 180 tablet 0    fluticasone (FLONASE) 50 MCG/ACT nasal spray Spray 1 spray into both nostrils daily 18.2 mL 0    fluticasone-salmeterol (ADVAIR HFA) 115-21 MCG/ACT inhaler Inhale 2 puffs into the lungs 2 times daily      levalbuterol (XOPENEX HFA) 45 MCG/ACT inhaler Inhale 1-2 puffs into the lungs every 6 hours as needed for wheezing 15 g 1    multivitamin w/minerals (MULTI-VITAMIN) tablet Take 1 tablet by mouth daily      norethindrone-ethinyl estradiol (ORTHO-NOVUM) 1-35 MG-MCG tablet Take 1 tablet by mouth daily 84 tablet 3    ondansetron (ZOFRAN) 4 MG tablet Take one tablet every six hours for nausea during colonoscopy bowel prepping 3 tablet 0    polyethylene glycol (GOLYTELY) 236 g suspension Take as directed. Two days before your exam fill the first container with water. Cover and shake until mixed well. At 5:00pm drink one 8oz glass every 10-15 minutes until half (1/2) of the first container is empty. Store the remainder in the refrigerator. One day before your exam at 5:00pm drink the second half of the first container until it is gone. Before you go to bed mix the second container with water and put in refrigerator. Six hours before your check in time drink one 8oz glass every 10-15 minutes until half of container is empty. Discard the remainder of solution. 8000 mL 0    polyethylene  glycol (GOLYTELY) 236 g suspension Take as directed. Two days before your exam fill the first container with water. Cover and shake until mixed well. At 5:00pm drink one 8oz glass every 10-15 minutes until half (1/2) of the first container is empty. Store the remainder in the refrigerator. One day before your exam at 5:00pm drink the second half of the first container until it is gone. Before you go to bed mix the second container with water and put in refrigerator. Six hours before your check in time drink one 8oz glass every 10-15 minutes until half of container is empty. Discard the remainder of solution. 8000 mL 0    propranolol ER (INDERAL LA) 60 MG 24 hr capsule Take 1 capsule (60 mg) by mouth daily 90 capsule 3    rimegepant (NURTEC) 75 MG ODT tablet Take 1 tablet (75 mg) by mouth every 48 hours (Patient not taking: Reported on 9/18/2023) 45 tablet 1    Risankizumab-rzaa (SKYRIZI) 360 MG/2.4ML SOCT Inject 2.4 mLs (360 mg) Subcutaneous once every eight weeks 2.4 mL 2     No current facility-administered medications for this visit.     ALLERGIES:    Allergies   Allergen Reactions    Codeine      FHx:  Family History   Problem Relation Age of Onset    Pulmonary Embolism Mother         In 2013 at age 63 years, Factor V deficiency, indefinite anticoagulation    Asthma Mother     Migraines Mother     Depression Mother     Depression Sister     Cancer Maternal Grandmother     Cancer Paternal Grandmother     Cerebrovascular Disease Paternal Grandfather     Glaucoma Maternal Great-Grandmother        SOCIAL Hx:  Social History     Socioeconomic History    Marital status: Single     Spouse name: Not on file    Number of children: Not on file    Years of education: Not on file    Highest education level: Not on file   Occupational History    Not on file   Tobacco Use    Smoking status: Never     Passive exposure: Never    Smokeless tobacco: Never   Substance and Sexual Activity    Alcohol use: Not Currently     Comment:  Rare    Drug use: Never    Sexual activity: Yes     Partners: Male     Birth control/protection: Pill   Other Topics Concern    Not on file   Social History Narrative    6/21/2021 Employed as an  at [employer]. Still working from home but anticipating a return to the office. Male partner. Has a cat.      Social Determinants of Health     Financial Resource Strain: Not on file   Food Insecurity: Not on file   Transportation Needs: Not on file   Physical Activity: Not on file   Stress: Not on file   Social Connections: Not on file   Interpersonal Safety: Not on file   Housing Stability: Not on file       ROS: A comprehensive Review of Systems was asked and answered in the negative unless specifically commented upon in the HPI    Physical Exam  There were no vitals taken for this visit.  There is no height or weight on file to calculate BMI.  Limited exam VV visit   Gen:Pt in no acute distress  HEENT: no scleral icterus.  Lungs: Non labored breathing  Skin: no jaundice  Neuro: Alert and oriented x 3     LABS:  CBC RESULTS:   Recent Labs   Lab Test 03/25/24  1103   WBC 8.1   RBC 4.76   HGB 13.1   HCT 39.5   MCV 83   MCH 27.5   MCHC 33.2   RDW 12.4       Last Comprehensive Metabolic Panel:  Lab Results   Component Value Date     03/25/2024    POTASSIUM 4.3 03/25/2024    CHLORIDE 101 03/25/2024    CO2 24 03/25/2024    ANIONGAP 13 03/25/2024     (H) 03/25/2024    BUN 5.6 (L) 03/25/2024    CR 0.78 03/25/2024    GFRESTIMATED >90 03/25/2024    JENNIFER 9.0 03/25/2024        CRP Inflammation   Date Value Ref Range Status   03/25/2024 21.70 (H) <5.00 mg/L Final      IMAGING/PROCEDURES  Colonoscopy 3/25/2024  Findings:       The perianal and digital rectal examinations were normal.        The diamond-terminal ileum appeared normal.        Single small ulcer at the ileocolonic anastomosis, consistent with        Rutgeert's i1. The Simple Endoscopic Score for Crohn's Disease was        determined  based on the endoscopic appearance of the mucosa in the        following segments:        - Ileum: Findings include no ulcers present, no ulcerated surfaces, no        affected surfaces and no narrowings. Segment score: 0.        - Right Colon: Findings include surgically absent. Segment score: 0.        - Transverse Colon: Findings include no ulcers present, no ulcerated        surfaces, no affected surfaces and no narrowings. Segment score: 0.        - Left Colon: Findings include no ulcers present, no ulcerated surfaces,        no affected surfaces and no narrowings. Segment score: 0.        - Rectum: Findings include no ulcers present, no ulcerated surfaces, no        affected surfaces and no narrowings. Segment score: 0.        - Total SES-CD aggregate score: 0. Biopsies were taken with a cold        forceps for histology.                                                                                    Impression:            - Rutgeert's i1. Simple Endoscopic Score for Crohn's                          Disease: 0, mucosal inflammatory changes secondary to                          Crohn's disease, in remission. Biopsied.                          - The examined portion of the diamond-terminal ileum was                          normal.   Recommendation:        - Discharge patient to home (ambulatory).                          - Resume previous diet.                          - Continue present medications.                          - Await pathology results.                          - The findings and recommendations were discussed with                          the patient.     Final Diagnosis   A. TRANSVERSE COLON, BIOPSIES:  - Colonic mucosa with no significant histologic abnormality  - Negative for acute inflammation, signs of chronicity and dysplasia     B. DESCENDING COLON, BIOPSIES:  - Colonic mucosa with crypt architectural distortion and left-sided Paneth cell metaplasia; compatible with site of prior injury  -  Negative for acute inflammation and dysplasia     C. SIGMOID COLON,BIOPSIES:  - Colonic mucosa with crypt architectural distortion and left-sided Paneth cell metaplasia; compatible with site of prior injury  - Negative for acute inflammation and dysplasia     D.  RECTUM, BIOPSIES:  - Colorectal mucosa with minimal crypt architectural distortion and focal Paneth cell metaplasia; compatible with site of prior injury  - Negative for acute inflammation and dysplasia

## 2024-04-05 ENCOUNTER — TELEPHONE (OUTPATIENT)
Dept: GASTROENTEROLOGY | Facility: CLINIC | Age: 39
End: 2024-04-05
Payer: COMMERCIAL

## 2024-04-05 NOTE — TELEPHONE ENCOUNTER
Left Voicemail (1st Attempt) and Sent Mychart (1st Attempt) for the patient to call back and schedule the following:    Appointment type: Return IBD  Provider: Dr Ivey  Return date: 10/02/24  Specialty phone number: [phone]  Additional appointment(s) needed: N/A  Additonal Notes: N/A

## 2024-04-09 ENCOUNTER — TELEPHONE (OUTPATIENT)
Dept: GASTROENTEROLOGY | Facility: CLINIC | Age: 39
End: 2024-04-09
Payer: COMMERCIAL

## 2024-04-09 NOTE — TELEPHONE ENCOUNTER
Left Voicemail (2nd Attempt) for the patient to call back and schedule the following:    Appointment type:  return IBD  Provider: Dr. Ivey  Return date: 10/2/24  Specialty phone number: [phone]   Additional appointment(s) needed:  Additonal Notes:

## 2024-04-10 ASSESSMENT — SLEEP AND FATIGUE QUESTIONNAIRES
HOW LIKELY ARE YOU TO NOD OFF OR FALL ASLEEP WHILE SITTING AND READING: SLIGHT CHANCE OF DOZING
HOW LIKELY ARE YOU TO NOD OFF OR FALL ASLEEP WHILE WATCHING TV: SLIGHT CHANCE OF DOZING
HOW LIKELY ARE YOU TO NOD OFF OR FALL ASLEEP WHILE SITTING INACTIVE IN A PUBLIC PLACE: WOULD NEVER DOZE
HOW LIKELY ARE YOU TO NOD OFF OR FALL ASLEEP WHILE SITTING AND TALKING TO SOMEONE: WOULD NEVER DOZE
HOW LIKELY ARE YOU TO NOD OFF OR FALL ASLEEP WHILE LYING DOWN TO REST IN THE AFTERNOON WHEN CIRCUMSTANCES PERMIT: HIGH CHANCE OF DOZING
HOW LIKELY ARE YOU TO NOD OFF OR FALL ASLEEP WHILE SITTING QUIETLY AFTER LUNCH WITHOUT ALCOHOL: WOULD NEVER DOZE
HOW LIKELY ARE YOU TO NOD OFF OR FALL ASLEEP IN A CAR, WHILE STOPPED FOR A FEW MINUTES IN TRAFFIC: WOULD NEVER DOZE
HOW LIKELY ARE YOU TO NOD OFF OR FALL ASLEEP WHEN YOU ARE A PASSENGER IN A CAR FOR AN HOUR WITHOUT A BREAK: SLIGHT CHANCE OF DOZING

## 2024-04-11 ENCOUNTER — VIRTUAL VISIT (OUTPATIENT)
Dept: SLEEP MEDICINE | Facility: CLINIC | Age: 39
End: 2024-04-11
Attending: FAMILY MEDICINE
Payer: COMMERCIAL

## 2024-04-11 VITALS — BODY MASS INDEX: 40.73 KG/M2 | WEIGHT: 275 LBS | HEIGHT: 69 IN

## 2024-04-11 DIAGNOSIS — E66.01 MORBID OBESITY (H): ICD-10-CM

## 2024-04-11 DIAGNOSIS — R53.83 FATIGUE, UNSPECIFIED TYPE: ICD-10-CM

## 2024-04-11 DIAGNOSIS — R73.03 PREDIABETES: ICD-10-CM

## 2024-04-11 DIAGNOSIS — R29.818 SUSPECTED SLEEP APNEA: Primary | ICD-10-CM

## 2024-04-11 DIAGNOSIS — R06.83 SNORING: ICD-10-CM

## 2024-04-11 DIAGNOSIS — K58.9 IRRITABLE BOWEL SYNDROME, UNSPECIFIED TYPE: ICD-10-CM

## 2024-04-11 PROCEDURE — 99205 OFFICE O/P NEW HI 60 MIN: CPT | Mod: 95 | Performed by: NURSE PRACTITIONER

## 2024-04-11 ASSESSMENT — PAIN SCALES - GENERAL: PAINLEVEL: MODERATE PAIN (4)

## 2024-04-11 NOTE — PATIENT INSTRUCTIONS
"          MY TREATMENT INFORMATION FOR SLEEP APNEA-  Lou Treviño    DOCTOR : ANDRES Granger CNP      Am I having a home sleep study?  --->Watch the video for the device you are using:    -/drop off device-   https://www.Sequent.com/watch?v=yGGFBdELGhk          Frequently asked questions:  1. What is Obstructive Sleep Apnea (ERIC)? ERIC is the most common type of sleep apnea. Apnea means, \"without breath.\"  Apnea is most often caused by narrowing or collapse of the upper airway as muscles relax during sleep.   Almost everyone has occasional apneas. Most people with sleep apnea have had brief interruptions at night frequently for many years.  The severity of sleep apnea is related to how frequent and severe the events are.   2. What are the consequences of ERIC? Symptoms include: feeling sleepy during the day, snoring loudly, gasping or stopping of breathing, trouble sleeping, and occasionally morning headaches or heartburn at night.  Sleepiness can be serious and even increase the risk of falling asleep while driving. Other health consequences may include development of high blood pressure and other cardiovascular disease in persons who are susceptible. Untreated ERIC  can contribute to heart disease, stroke and diabetes.   3. What are the treatment options? In most situations, sleep apnea is a lifelong disease that must be managed with daily therapy. Medications are not effective for sleep apnea and surgery is generally not considered until other therapies have been tried. Your treatment is your choice . Continuous Positive Airway (CPAP) works right away and is the therapy that is effective in nearly everyone. An oral device to hold your jaw forward is usually the next most reliable option. Other options include postioning devices (to keep you off your back), weight loss, and surgery including a tongue pacing device. There is more detail about some of these options below.  4. Are my sleep studies " covered by insurance? Although we will request verification of coverage, we advise you also check in advance of the study to ensure there is coverage.    Important tips for those choosing CPAP and similar devices  REMEMBER-IF YOU RECEIVE A CALL FROM  [phone]-->IT IS TO SETUP A DEVICE  For new devices, sign up for device NUSRAT to monitor your device for your followup visits  We encourage you to utilize the [employer] nusrat or website ( https://[employer].FREEjit/ ) to monitor your therapy progress and share the data with your healthcare team when you discuss your sleep apnea.                                                    Know your equipment:  CPAP is continuous positive airway pressure that prevents obstructive sleep apnea by keeping the throat from collapsing while you are sleeping. In most cases, the device is  smart  and can slowly self-adjusts if your throat collapses and keeps a record every day of how well you are treated-this information is available to you and your care team.  BPAP is bilevel positive airway pressure that keeps your throat open and also assists each breath with a pressure boost to maintain adequate breathing.  Special kinds of BPAP are used in patients who have inadequate breathing from lung or heart disease. In most cases, the device is  smart  and can slowly self-adjusts to assist breathing. Like CPAP, the device keeps a record of how well you are treated.  Your mask is your connection to the device. You get to choose what feels most comfortable and the staff will help to make sure if fits. Here: are some examples of the different masks that are available: Magnetic mask aids may assist with use but there are safety issues that should be addressed when considering with magnets* ( see end of discussion).       Key points to remember on your journey with sleep apnea:  Sleep study.  PAP devices often need to be adjusted during a sleep study to show that they are effective and adjusted  right.  Good tips to remember: Try wearing just the mask during a quiet time during the day so your body adapts to wearing it. A humidifier is recommended for comfort in most cases to prevent drying of your nose and throat. Allergy medication from your provider may help you if you are having nasal congestion.  Getting settled-in. It takes more than one night for most of us to get used to wearing a mask. Try wearing just the mask during a quiet time during the day so your body adapts to wearing it. A humidifier is recommended for comfort in most cases. Our team will work with you carefully on the first day and will be in contact within 4 days and again at 2 and 4 weeks for advice and remote device adjustments. Your therapy is evaluated by the device each day.   Use it every night. The more you are able to sleep naturally for 7-8 hours, the more likely you will have good sleep and to prevent health risks or symptoms from sleep apnea. Even if you use it 4 hours it helps. Occasionally all of us are unable to use a medical therapy, in sleep apnea, it is not dangerous to miss one night.   Communicate. Call our skilled team on the number provided on the first day if your visit for problems that make it difficult to wear the device. Over 2 out of 3 patients can learn to wear the device long-term with help from our team. Remember to call our team or your sleep providers if you are unable to wear the device as we may have other solutions for those who cannot adapt to mask CPAP therapy. It is recommended that you sleep your sleep provider within the first 3 months and yearly after that if you are not having problems.   Use it for your health. We encourage use of CPAP masks during daytime quiet periods to allow your face and brain to adapt to the sensation of CPAP so that it will be a more natural sensation to awaken to at night or during naps. This can be very useful during the first few weeks or months of adapting to CPAP  though it does not help medically to wear CPAP during wakefulness and  should not be used as a strategy just to meet guidelines.  Take care of your equipment. Make sure you clean your mask and tubing using directions every day and that your filter and mask are replaced as recommended or if they are not working.     *Masks with magnets:  Updated Contraindications  Masks with magnetic components are contraindicated for use by patients where they, or anyone in close physical contact while using the mask, have the following:   Active medical implants that interact with magnets (i.e., pacemakers, implantable cardioverter defibrillators (ICD), neurostimulators, cerebrospinal fluid (CSF) shunts, insulin/infusion pumps)   Metallic implants/objects containing ferromagnetic material (i.e., aneurysm clips/flow disruption devices, embolic coils, stents, valves, electrodes, implants to restore hearing or balance with implanted magnets, ocular implants, metallic splinters in the eye)  Updated Warning  Keep the mask magnets at a safe distance of at least 6 inches (150 mm) away from implants or medical devices that may be adversely affected by magnetic interference. This warning applies to you or anyone in close physical contact with your mask. The magnets are in the frame and lower headgear clips, with a magnetic field strength of up to 400mT. When worn, they connect to secure the mask but may inadvertently detach while asleep.  Implants/medical devices, including those listed within contraindications, may be adversely affected if they change function under external magnetic fields or contain ferromagnetic materials that attract/repel to magnetic fields (some metallic implants, e.g., contact lenses with metal, dental implants, metallic cranial plates, screws, pipo hole covers, and bone substitute devices). Consult your physician and  of your implant / other medical device for information on the potential adverse  effects of magnetic fields.    BESIDES CPAP, WHAT OTHER THERAPIES ARE THERE?    Positioning Device  Positioning devices are generally used when sleep apnea is mild and only occurs on your back.This example shows a pillow that straps around the waist. It may be appropriate for those whose sleep study shows milder sleep apnea that occurs primarily when lying flat on one's back. Preliminary studies have shown benefit but effectiveness at home may need to be verified by a home sleep test. These devices are generally not covered by medical insurance.  Examples of devices that maintain sleeping on the back to prevent snoring and mild sleep apnea.    Belt type body positioner  http://[employer]/    Electronic reminder  http://nightshifttherapy.com/            Oral Appliance  What is oral appliance therapy?  An oral appliance device fits on your teeth at night like a retainer used after having braces. The device is made by a specialized dentist and requires several visits over 1-2 months before a manufactured device is made to fit your teeth and is adjusted to prevent your sleep apnea. Once an oral device is working properly, snoring should be improved. A home sleep test may be recommended at that time if to determine whether the sleep apnea is adequately treated.       Some things to remember:  -Oral devices are often, but not always, covered by your medical insurance. Be sure to check with your insurance provider.   -If you are referred for oral therapy, you will be given a list of specialized dentists to consider or you may choose to visit the Web site of the American Academy of Dental Sleep Medicine  -Oral devices are less likely to work if you have severe sleep apnea or are extremely overweight.     More detailed information  An oral appliance is a small acrylic device that fits over the upper and lower teeth  (similar to a retainer or a mouth guard). This device slightly moves jaw forward, which moves the base of  the tongue forward, opens the airway, improves breathing for effective treat snoring and obstructive sleep apnea in perhaps 7 out of 10 people .  The best working devices are custom-made by a dental device  after a mold is made of the teeth 1, 2, 3.  When is an oral appliance indicated?  Oral appliance therapy is recommended as a first-line treatment for patients with primary snoring, mild sleep apnea, and for patients with moderate sleep apnea who prefer appliance therapy to use of CPAP4, 5. Severity of sleep apnea is determined by sleep testing and is based on the number of respiratory events per hour of sleep.   How successful is oral appliance therapy?  The success rate of oral appliance therapy in patients with mild sleep apnea is 75-80% while in patients with moderate sleep apnea it is 50-70%. The chance of success in patients with severe sleep apnea is 40-50%. The research also shows that oral appliances have a beneficial effect on the cardiovascular health of ERIC patients at the same magnitude as CPAP therapy7.  Oral appliances should be a second-line treatment in cases of severe sleep apnea, but if not completely successful then a combination therapy utilizing CPAP plus oral appliance therapy may be effective. Oral appliances tend to be effective in a broad range of patients although studies show that the patients who have the highest success are females, younger patients, those with milder disease, and less severe obesity. 3, 6.   Finding a dentist that practices dental sleep medicine  Specific training is available through the American Academy of Dental Sleep Medicine for dentists interested in working in the field of sleep. To find a dentist who is educated in the field of sleep and the use of oral appliances, near you, visit the Web site of the American Academy of Dental Sleep Medicine.    References  1. Kaitlin et al. Objectively measured vs self-reported compliance during oral  appliance therapy for sleep-disordered breathing. Chest 2013; 144(5): 8849-3281.  2. Vasyl, et al. Objective measurement of compliance during oral appliance therapy for sleep-disordered breathing. Thorax 2013; 68(1): 91-96.  3. Julita et al. Mandibular advancement devices in 620 men and women with ERIC and snoring: tolerability and predictors of treatment success. Chest 2004; 125: 4772-3523.  4. Guillermo, et al. Oral appliances for snoring and ERIC: a review. Sleep 2006; 29: 244-262.  5. Shyann et al. Oral appliance treatment for ERIC: an update. J Clin Sleep Med 2014; 10(2): 215-227.  6. Keith et al. Predictors of OSAH treatment outcome. J Dent Res 2007; 86: 8058-4538.      Weight Loss:   Your Body mass index is 40.61 kg/m .    Being overweight does not necessarily mean you will have health consequences.  Those who have BMI over 35 or over 27 with existing medical conditions carries greater risk.   Weight loss decreases severity of sleep apnea in most people with obesity. For those with mild obesity who have developed snoring with weight gain, even 15-30 pound weight loss can improve and occasionally milder eliminate sleep apnea.  Structured and life-long dietary and health habits are necessary to lose weight and keep healthier weight levels.     The Comprehensive Weight loss program offers all aspects of weight loss strategies including two Non-Surgical Weight Loss Programs: Medical Weight Management and our 24 Week Healthy Lifestyle Program:    Medical Weight Management: You will meet with a Medical Weight Management Provider, as well as a Registered Dietician. The program may include medication therapy, dietary education, recommended exercise and physical therapy programs, monthly support group meetings, and possible psychological counseling. Follow up visits with the provider or dietician are scheduled based on your progress and needs.    24 Week Healthy Lifestyle Program: This unique program is  designed to give you the support of weekly appointments and activities thru a 24-week period. It may include all of the components of the basic program (above), with the addition of 11 individual Health  Visits, 24-week access to the [employer] website for over 700 online classes, and monthly support group meetings. This program has an out-of-pocket expense of $499 to cover the items that can not be billed to insurance (health coaches and [employer] access), and is non-refundable/non-transferable (you may be able to use a Health Savings Account; ask your HSA provider). There may be an optional meal replacement plan prescribed as well.   Surgical management achieves meaningful long-term weight loss and improvement in health risks in most patients with more severe obesity.      Sleep Apnea Surgery:    Surgery for obstructive sleep apnea is considered generally only when other therapies fail to work. Surgery may be discussed with you if you are having a difficult time tolerating CPAP and or when there is an abnormal structure that requires surgical correction.  Nose and throat surgeries often enlarge the airway to prevent collapse.  Most of these surgeries create pain for 1-2 weeks and up to half of the most common surgeries are not effective throughout life.  You should carefully discuss the benefits and drawbacks to surgery with your sleep provider and surgeon to determine if it is the best solution for you.   More information  Surgery for ERIC is directed at areas that are responsible for narrowing or complete obstruction of the airway during sleep.  There are a wide range of procedures available to enlarge and/or stabilize the airway to prevent blockage of breathing in the three major areas where it can occur: the palate, tongue, and nasal regions.  Successful surgical treatment depends on the accurate identification of the factors responsible for obstructive sleep apnea in each person.  A personalized approach  is required because there is no single treatment that works well for everyone.  Because of anatomic variation, consultation with an examination by a sleep surgeon is a critical first step in determining what surgical options are best for each patient.  In some cases, examination during sedation may be recommended in order to guide the selection of procedures.  Patients will be counseled about risks and benefits as well as the typical recovery course after surgery. Surgery is typically not a cure for a person s ERIC.  However, surgery will often significantly improve one s ERIC severity (termed  success rate ).  Even in the absence of a cure, surgery will decrease the cardiovascular risk associated with OSA7; improve overall quality of life8 (sleepiness, functionality, sleep quality, etc).      Palate Procedures:  Patients with ERIC often have narrowing of their airway in the region of their tonsils and uvula.  The goals of palate procedures are to widen the airway in this region as well as to help the tissues resist collapse.  Modern palate procedure techniques focus on tissue conservation and soft tissue rearrangement, rather than tissue removal.  Often the uvula is preserved in this procedure. Residual sleep apnea is common in patient after pharyngoplasty with an average reduction in sleep apnea events of 33%2.      Tongue Procedures:  ExamWhile patients are awake, the muscles that surround the throat are active and keep this region open for breathing. These muscles relax during sleep, allowing the tongue and other structures to collapse and block breathing.  There are several different tongue procedures available.  Selection of a tongue base procedure depends on characteristics seen on physical exam.  Generally, procedures are aimed at removing bulky tissues in this area or preventing the back of the tongue from falling back during sleep.  Success rates for tongue surgery range from 50-62%3.    Hypoglossal Nerve  Stimulation:  Hypoglossal nerve stimulation has recently received approval from the [place] Food and Drug Administration for the treatment of obstructive sleep apnea.  This is based on research showing that the system was safe and effective in treating sleep apnea6.  Results showed that the median AHI score decreased 68%, from 29.3 to 9.0. This therapy uses an implant system that senses breathing patterns and delivers mild stimulation to airway muscles, which keeps the airway open during sleep.  The system consists of three fully implanted components: a small generator (similar in size to a pacemaker), a breathing sensor, and a stimulation lead.  Using a small handheld remote, a patient turns the therapy on before bed and off upon awakening.    Candidates for this device must be greater than 18 years of age, have moderate to severe obstructive sleep apnea with less than 25% central events  (AHI between 15-65), BMI less than 35, have tried CPAP/oral appliance for at least 8 weeks without success, and have appropriate upper airway anatomy (determined by a sleep endoscopy performed by Dr. Vicente Oneal or Dr. Kyle Cantor).    Nasal Procedures:  Nasal obstruction can interfere with nasal breathing during the day and night.  Studies have shown that relief of nasal obstruction can improve the ability of some patients to tolerate positive airway pressure therapy for obstructive sleep apnea1.  Treatment options include medications such as nasal saline, topical corticosteroid and antihistamine sprays, and oral medications such as antihistamines or decongestants. Non-surgical treatments can include external nasal dilators for selected patients. If these are not successful by themselves, surgery can improve the nasal airway either alone or in combination with these other options.        Combination Procedures:  Combination of surgical procedures and other treatments may be recommended, particularly if patients have more  than one area of narrowing or persistent positional disease.  The success rate of combination surgery ranges from 66-80%2,3.    References  Ama LI. The Role of the Nose in Snoring and Obstructive Sleep Apnoea: An Update.  Eur Arch Otorhinolaryngol. 2011; 268: 1365-73.   Haroon SM; Feliciano JA; Kelli JR; Pallanch JF; Cb MB; Vamsi SG; Nelson ELLINGTON. Surgical modifications of the upper airway for obstructive sleep apnea in adults: a systematic review and meta-analysis. SLEEP 2010;33(10):3616-1419. Susi LIU. Hypopharyngeal surgery in obstructive sleep apnea: an evidence-based medicine review.  Arch Otolaryngol Head Neck Surg. 2006 Feb;132(2):206-13.  Isiah YH1, Kecia Y, Charlie LALIT. The efficacy of anatomically based multilevel surgery for obstructive sleep apnea. Otolaryngol Head Neck Surg. 2003 Oct;129(4):327-35.  Kezirian E, Goldberg A. Hypopharyngeal Surgery in Obstructive Sleep Apnea: An Evidence-Based Medicine Review. Arch Otolaryngol Head Neck Surg. 2006 Feb;132(2):206-13.  Marck PJ et al. Upper-Airway Stimulation for Obstructive Sleep Apnea.  N Engl J Med. 2014 Jan 9;370(2):139-49.  Ibis Y et al. Increased Incidence of Cardiovascular Disease in Middle-aged Men with Obstructive Sleep Apnea. Am J Respir Crit Care Med; 2002 166: 159-165  Goldstein EM et al. Studying Life Effects and Effectiveness of Palatopharyngoplasty (SLEEP) study: Subjective Outcomes of Isolated Uvulopalatopharyngoplasty. Otolaryngol Head Neck Surg. 2011; 144: 623-631.        WHAT IF I ONLY HAVE SNORING?    Mandibular advancement devices, lateral sleep positioning, long-term weight loss and treatment of nasal allergies have been shown to improve snoring.  Exercising tongue muscles with a game (https://apps.Entrenarme/us/nusrat/soundly-reduce-snoring/wx5979107045) or stimulating the tongue during the day with a device (https://doi.org/10.3390/vct27988036) have improved snoring in some  individuals.  https://www.[employer]/  https://www.sleepfoundation.org/best-anti-snoring-mouthpieces-and-mouthguards    Remember to Drive Safe... Drive Alive     Sleep health profoundly affects your health, mood, and your safety.  Thirty three percent of the population (one in three of us) is not getting enough sleep and many have a sleep disorder. Not getting enough sleep or having an untreated / undertreated sleep condition may make us sleepy without even knowing it. In fact, our driving could be dramatically impaired due to our sleep health. As your provider, here are some things I would like you to know about driving:     Here are some warning signs for impairment and dangerous drowsy driving:              -Having been awake more than 16 hours               -Looking tired               -Eyelid drooping              -Head nodding (it could be too late at this point)              -Driving for more than 30 minutes     Some things you could do to make the driving safer if you are experiencing some drowsiness:              -Stop driving and rest              -Call for transportation              -Make sure your sleep disorder is adequately treated     Some things that have been shown NOT to work when experiencing drowsiness while driving:              -Turning on the radio              -Opening windows              -Eating any  distracting  /  entertaining  foods (e.g., sunflower seeds, candy, or any other)              -Talking on the phone      Your decision may not only impact your life, but also the life of others. Please, remember to drive safe for yourself and all of us.

## 2024-04-11 NOTE — PROGRESS NOTES
Outpatient Sleep Medicine Consultation:      Name: Lou Treviño MRN# [id number]   Age: 38 year old YOB: 1985     Date of Consultation: April 11, 2024  Consultation is requested by: Silvia Groves MD  [address] [place] [address] Silvia Groves  Primary care provider: Silvia Groves       Reason for Sleep Consult:     Lou Treviño is sent by Silvia Groves for a sleep consultation regarding snoring, fatigue.     Patient s Reason for visit  Lou Treviño main reason for visit: Sleep too hard, problems waking up, always tired  Patient states problem(s) started: Has always been an issue, now impacting work  Lou Treviño's goals for this visit: Discuss options           Assessment and Plan:     Summary Sleep Diagnoses and Recommendations:  1. Suspected sleep apnea  And she has a STOP-BANG score of 4/8.  She has positives of snoring, fatigue, BMI, neck circumference.  This places her at a high pretest probability for obstructive sleep apnea.  - HST-Home Sleep Apnea Test - Noxturnal Returnable; Future    2. Snoring  - Sleep Study Referral    3. Fatigue, unspecified type  - Sleep Study Referral    Comorbid Diagnoses:  4. Morbid obesity (H)    5. Prediabetes    6. Irritable bowel syndrome, unspecified type      Summary Counseling:    Sleep Testing Reviewed  Obstructive Sleep Apnea Reviewed  Complications of Untreated Sleep Apnea Reviewed      Patient will follow up with a [employer] message after completing sleep study.  Patient will be contacted and therapy will be initiated if indicated  ANDRES Ahuja, CNP    Total time spent reviewing medical records, history and physical examination, review of previous testing and interpretation as well as documentation on this date: 60 minutes    CC: Silvia Groves          History of Present Illness:     Lou Treviño presents to the sleep medicine clinic with concerns of snoring, fatigue,  "sleeping \"hard.\"    Patient states that she has always had issues with her sleep, in particular waking up.  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  Patient states she obtains 7-8 hours of sleep yet she would appreciate 9.  We did discuss sleep hygiene practices.  She does nap for 1-3 hours at a stretch.    Valorie does suffer from socially disruptive snoring, and snort arousals.  She awakens with a stuffy nose, has recurring nightmares, hypnagogic hallucinations.  She does suffer from night sweats, dry mouth in the morning, difficulty breathing through her nose.  She becomes short of breath with activity nocturnal GERD is problematic as it is anxiety and depression.    Daytime fatigue  Inadequate sleep hygiene  IBD, controlled     [place] Sleepiness Scale  Total score - [place]:6   (Less than 10 normal)     Insomnia Severity Scale  ANNE Total Score: 11  (normal 0-7, mild 8-14, moderate 15-21, severe 22-28)    STOP-BANG score 4/8 which places her at a high risk for sleep apnea.  Plans for her include home sleep test after which she has been asked to follow-up with a [employer] message that she has completed it.  In turn, she will be notified of her sleep results, and we will collaborate, determining the next steps in her plan of care.        Social History:  ,  . Computer work    Past Sleep Evaluations:  None    SLEEP-WAKE SCHEDULE:     Work/School Days: Patient goes to school/work: Yes   Usually gets into bed at Between 11 and 12  Takes patient about 5-20 minutes to fall asleep  Has trouble falling asleep Rarely nights per week  Wakes up in the middle of the night Rarely times.  Wakes up due to Uncertain  She has trouble falling back asleep 0 times a week.   It usually takes 5-10 mins to get back to sleep  Patient is usually up at Workdays between 7:10 and 7:20  Uses alarm: Yes    Weekends/Non-work Days/All Other " Days:  Usually gets into bed at 12-1   Takes patient about 5-20 mins to fall asleep  Patient is usually up at Whenever i wake up, varies, usually by 11  Uses alarm: Yes    Sleep Need  Patient gets  7-8 hours sleep on average Never feels rested when she wakes up. She does dream. Not as restful as she would like it to be.   Patient thinks she needs about 9 at least sleep On days off, sleeps until she wakes up and still feels tired    Lou Treviño prefers to sleep in this position(s): Stomach   Patient states they do the following activities in bed: Read;Watch TV;Use phone, computer, or tablet   Sleep hygiene discussed    Naps  Patient takes a purposeful nap 1 times a week and naps are usually 1-3 hours in duration  She feels better after a nap: Yes  She dozes off unintentionally 1 days per week While watching TV, movie, on phone  Patient has had a driving accident or near-miss due to sleepiness/drowsiness: No      SLEEP DISRUPTIONS:    Breathing/Snoring  Patient snores:Yes  Other people complain about her snoring: Yes  Patient has been told she stops breathing in her sleep:No  She has issues with the following: Stuffy nose when you wake up.     Movement:  Patient gets pain, discomfort, with an urge to move:  No restless legs symptoms  It happens when she is resting:  No  It happens more at night:  No  Patient has been told she kicks her legs at night:  No     Behaviors in Sleep:  Lou Treviño has experienced the following behaviors while sleeping: Recurring Nightmares;See or hear things that are not really there upon awakening or just falling asleep  She has experienced sudden muscle weakness during the day: No  Pt denies bruxism, sleep talking, sleep walking, and dream enactment behavior. Pt denies sleep paralysis, hypnagogue and cataplexy.   Sees a bug out of the corner of her eye    Is there anything else you would like your sleep provider to know: Depression, IBD in remission      CAFFEINE AND OTHER  SUBSTANCES:    Patient consumes caffeinated beverages per day:  0  Last caffeine use is usually: N/A  List of any prescribed or over the counter stimulants that patient takes:    List of any prescribed or over the counter sleep medication patient takes: No  List of previous sleep medications that patient has tried: No  Patient drinks alcohol to help them sleep: No  Patient drinks alcohol near bedtime: No    Family History:  Patient has a family member been diagnosed with a sleep disorder: Yes  Father, sleep apnea uses CPAP         SCALES:    EPWORTH SLEEPINESS SCALE         4/10/2024    10:33 PM    [place] Sleepiness Scale ( ELIZA Sprague  [id number]<br>ESS - [place]/English - Final version - 21 Nov 07 - [facility] [place].)   Sitting and reading Slight chance of dozing   Watching TV Slight chance of dozing   Sitting, inactive in a public place (e.g. a theatre or a meeting) Would never doze   As a passenger in a car for an hour without a break Slight chance of dozing   Lying down to rest in the afternoon when circumstances permit High chance of dozing   Sitting and talking to someone Would never doze   Sitting quietly after a lunch without alcohol Would never doze   In a car, while stopped for a few minutes in traffic Would never doze   [place] Score (MC) 6   [place] Score (Sleep) 6         INSOMNIA SEVERITY INDEX (ANNE)          4/10/2024    10:20 PM   Insomnia Severity Index (ANNE)   Difficulty falling asleep 1   Difficulty staying asleep 0   Problems waking up too early 0   How SATISFIED/DISSATISFIED are you with your CURRENT sleep pattern? 3   How NOTICEABLE to others do you think your sleep problem is in terms of impairing the quality of your life? 2   How WORRIED/DISTRESSED are you about your current sleep problem? 2   To what extent do you consider your sleep problem to INTERFERE with your daily functioning (e.g. daytime fatigue, mood, ability to function at work/daily chores, concentration, memory, mood, etc.)  CURRENTLY? 3   ANNE Total Score 11       Guidelines for Scoring/Interpretation:  Total score categories:  0-7 = No clinically significant insomnia   8-14 = Subthreshold insomnia   15-21 = Clinical insomnia (moderate severity)  22-28 = Clinical insomnia (severe)  Used via courtesy of www.ADMETAealth.va.gov with permission from Avinash Church PhD., [facility]      STOP BANG         4/10/2024    10:34 PM   STOP BANG Questionnaire (  2008, the American Society of Anesthesiologists, Inc. [employer]   1. Snoring - Do you snore loudly (louder than talking or loud enough to be heard through closed doors)? Yes   2. Tired - Do you often feel tired, fatigued, or sleepy during daytime? Yes   3. Observed - Has anyone observed you stop breathing during your sleep? No   4. Blood pressure - Do you have or are you being treated for high blood pressure? No   5. BMI - BMI more than 35 kg/m2? No   6. Age - Age over 50 yr old? No   7. Neck circumference - Neck circumference greater than 40 cm? No   8. Gender - Gender male? No   STOP BANG Score (MC): 3 (High risk of ERIC)         GAD7        3/31/2024    11:32 PM   ESTEFANI-7    1. Feeling nervous, anxious, or on edge 1   2. Not being able to stop or control worrying 1   3. Worrying too much about different things 1   4. Trouble relaxing 0   5. Being so restless that it is hard to sit still 0   6. Becoming easily annoyed or irritable 0   7. Feeling afraid, as if something awful might happen 1   ESTEFANI-7 Total Score 4   If you checked any problems, how difficult have they made it for you to do your work, take care of things at home, or get along with other people? Somewhat difficult         CAGE-AID        1/30/2024    12:29 PM   CAGE-AID Flowsheet   Have you ever felt you should Cut down on your drinking or drug use?    Have people Annoyed you by criticizing your drinking or drug use?    Have you ever felt bad or Guilty about your drinking or drug use?    Have you  "ever had a drink or used drugs first thing in the morning to steady your nerves or to get rid of a hangover? (Eye opener)    CAGE-AID SCORE    Have you ever felt you should Cut down on your drinking or drug use?    Have people Annoyed you by criticizing your drinking or drug use?    Have you ever felt bad or Guilty about your drinking or drug use?    Have you ever had a drink or used drugs first thing in the morning to steady your nerves or to get rid of a hangover? (Eye opener)    CAGE-AID SCORE        Information is confidential and restricted. Go to Review Flowsheets to unlock data.       CAGE-AID reprinted with permission from the [place] Medical Journal, MICHAEL West. and JANETTE Horn, \"Conjoint screening questionnaires for alcohol and drug abuse\" [place] Medical Journal 94: 135-140, 1995.      PATIENT HEALTH QUESTIONNAIRE-9 (PHQ - 9)        1/30/2024    12:26 PM   PHQ-9 ([employer])   1.  Little interest or pleasure in doing things 1   2.  Feeling down, depressed, or hopeless 1   3.  Trouble falling or staying asleep, or sleeping too much 1   4.  Feeling tired or having little energy 3   5.  Poor appetite or overeating 2   6.  Feeling bad about yourself - or that you are a failure or have let yourself or your family down 1   7.  Trouble concentrating on things, such as reading the newspaper or watching television 1   8.  Moving or speaking so slowly that other people could have noticed. Or the opposite - being so fidgety or restless that you have been moving around a lot more than usual 0   9.  Thoughts that you would be better off dead, or of hurting yourself in some way 0   PHQ-9 Total Score 10   6.  Feeling bad about yourself 1   7.  Trouble concentrating 1   8.  Moving slowly or restless 0   9.  Suicidal or self-harm thoughts 0   1.  Little interest or pleasure in doing things Several days   2.  Feeling down, depressed, or hopeless Several days   3.  Trouble falling or staying asleep, or sleeping too much " Several days   4.  Feeling tired or having little energy Nearly every day   5.  Poor appetite or overeating More than half the days   6.  Feeling bad about yourself Several days   7.  Trouble concentrating Several days   8.  Moving slowly or restless Not at all   9.  Suicidal or self-harm thoughts Not at all   PHQ-9 via [facility] TOTAL SCORE-----> 10 (Moderate depression)   Difficulty at work, home, or with people Somewhat difficult       Developed by Flower Duff, Day Gómez, Franki Cooper and colleagues, with an educational nicole from [employer] No permission required to reproduce, translate, display or distribute.        Allergies:    Allergies   Allergen Reactions    Codeine        Medications:    Current Outpatient Medications   Medication Sig Dispense Refill    bisacodyl (DULCOLAX) 5 MG EC tablet Two days prior to exam take two (2) tablets at 4pm. One day prior to exam take two (2) tablets at 4pm 4 tablet 0    bisacodyl (DULCOLAX) 5 MG EC tablet Two days prior to exam take two (2) tablets at 4pm. One day prior to exam take two (2) tablets at 4pm 4 tablet 0    budesonide (PULMICORT FLEXHALER) 90 MCG/ACT inhaler Inhale 2 puffs into the lungs 2 times daily 1 each 3    cetirizine (ZYRTEC) 10 MG tablet Take 1 tablet (10 mg) by mouth daily 90 tablet 1    citalopram (CELEXA) 20 MG tablet Take 1 tablet (20 mg) by mouth daily 90 tablet 1    famotidine (PEPCID) 20 MG tablet Take 1 tablet (20 mg) by mouth 2 times daily 180 tablet 0    fluticasone (FLONASE) 50 MCG/ACT nasal spray Spray 1 spray into both nostrils daily 18.2 mL 0    fluticasone-salmeterol (ADVAIR HFA) 115-21 MCG/ACT inhaler Inhale 2 puffs into the lungs 2 times daily      levalbuterol (XOPENEX HFA) 45 MCG/ACT inhaler Inhale 1-2 puffs into the lungs every 6 hours as needed for wheezing 15 g 1    multivitamin w/minerals (MULTI-VITAMIN) tablet Take 1 tablet by mouth daily      norethindrone-ethinyl estradiol (ORTHO-NOVUM) 1-35 MG-MCG tablet  Take 1 tablet by mouth daily 84 tablet 3    ondansetron (ZOFRAN) 4 MG tablet Take one tablet every six hours for nausea during colonoscopy bowel prepping 3 tablet 0    polyethylene glycol (GOLYTELY) 236 g suspension Take as directed. Two days before your exam fill the first container with water. Cover and shake until mixed well. At 5:00pm drink one 8oz glass every 10-15 minutes until half (1/2) of the first container is empty. Store the remainder in the refrigerator. One day before your exam at 5:00pm drink the second half of the first container until it is gone. Before you go to bed mix the second container with water and put in refrigerator. Six hours before your check in time drink one 8oz glass every 10-15 minutes until half of container is empty. Discard the remainder of solution. 8000 mL 0    polyethylene glycol (GOLYTELY) 236 g suspension Take as directed. Two days before your exam fill the first container with water. Cover and shake until mixed well. At 5:00pm drink one 8oz glass every 10-15 minutes until half (1/2) of the first container is empty. Store the remainder in the refrigerator. One day before your exam at 5:00pm drink the second half of the first container until it is gone. Before you go to bed mix the second container with water and put in refrigerator. Six hours before your check in time drink one 8oz glass every 10-15 minutes until half of container is empty. Discard the remainder of solution. 8000 mL 0    propranolol ER (INDERAL LA) 60 MG 24 hr capsule Take 1 capsule (60 mg) by mouth daily 90 capsule 3    rimegepant (NURTEC) 75 MG ODT tablet Take 1 tablet (75 mg) by mouth every 48 hours (Patient not taking: Reported on 9/18/2023) 45 tablet 1    Risankizumab-rzaa (SKYRIZI) 360 MG/2.4ML SOCT Inject 2.4 mLs (360 mg) Subcutaneous once every eight weeks 2.4 mL 2       Problem List:  Patient Active Problem List    Diagnosis Date Noted    Crohn's disease of both small and large intestine with rectal  bleeding (H) 08/31/2023     Priority: Medium    Elevated fecal calprotectin 07/21/2023     Priority: Medium     And positive heme occult with elevated serum inflammatory markers and WBC. No signs of GI infection (viral, bacterial, parasitic) with stool testing. Suspicion is high for inflammatory bowel disease. GI consult pending late July 2023        Left knee pain 05/16/2023     Priority: Medium    Hypoalbuminemia 07/01/2022     Priority: Medium    Hepatic steatosis 07/01/2022     Priority: Medium     Noted on abdominal US in May 2022. Normal AST/ALT.      Nausea 06/10/2022     Priority: Medium    Morbid obesity (H) 10/04/2021     Priority: Medium    Family history of factor V deficiency 06/29/2021     Priority: Medium     Mother has Factor V deficiency and is on lifetime anticoagulation after presenting with pulmonary embolism.       Generalized anxiety disorder 06/29/2021     Priority: Medium    Healthcare maintenance 06/29/2021     Priority: Medium     - Last pap smear in 2018 was NIL and negative HPV. Previously normal cytology in 2015. Next Pap due in 2023.      Prediabetes 06/29/2021     Priority: Medium     A1c of 6.0 in 2021.      Mild persistent asthma 06/21/2021     Priority: Medium     Previously listed as both mild intermittent and mild persistent at her previous PCP's office, but was prescribed Breo Ellipta. Not currently taking this as of June 2021.      Migraines 06/21/2021     Priority: Medium    IBS (irritable bowel syndrome) 06/21/2021     Priority: Medium    Anxiety and depression 06/21/2021     Priority: Medium    Allergies 06/21/2021     Priority: Medium    History of intussusception 06/21/2021     Priority: Medium     Hemicolectomy with ileocolostomy at 3 months of age due to intussusception with 11 cm of ileum and transverse colon resected.          Past Medical/Surgical History:  Past Medical History:   Diagnosis Date    Migraine     Noninfectious ileitis     Uncomplicated asthma      Past  Surgical History:   Procedure Laterality Date    COLONOSCOPY N/A 8/3/2023    Procedure: COLONOSCOPY WITH BIOPSIES;  Surgeon: Zoraida Jamil MD;  Location: [facility] OR    COLONOSCOPY N/A 3/25/2024    Procedure: Colonoscopy with biopsy;  Surgeon: Aiyana Ivey MD;  Location: [facility] OR    INTUSSUSCEPTION REPAIR  1986    Hemicolectomy, removal of 11 cm of proximal and distal colon       Social History:  Social History     Socioeconomic History    Marital status: Single     Spouse name: Not on file    Number of children: Not on file    Years of education: Not on file    Highest education level: Not on file   Occupational History    Not on file   Tobacco Use    Smoking status: Never     Passive exposure: Never    Smokeless tobacco: Never   Substance and Sexual Activity    Alcohol use: Not Currently     Comment: Rare    Drug use: Never    Sexual activity: Yes     Partners: Male     Birth control/protection: Pill   Other Topics Concern    Not on file   Social History Narrative    6/21/2021 Employed as an  at [employer]. Still working from home but anticipating a return to the office. Male partner. Has a cat.      Social Determinants of Health     Financial Resource Strain: Not on file   Food Insecurity: Not on file   Transportation Needs: Not on file   Physical Activity: Not on file   Stress: Not on file   Social Connections: Not on file   Interpersonal Safety: Not on file   Housing Stability: Not on file       Family History:  Family History   Problem Relation Age of Onset    Pulmonary Embolism Mother         In 2013 at age 63 years, Factor V deficiency, indefinite anticoagulation    Asthma Mother     Migraines Mother     Depression Mother     Depression Sister     Cancer Maternal Grandmother     Cancer Paternal Grandmother     Cerebrovascular Disease Paternal Grandfather     Glaucoma Maternal Great-Grandmother        Review of Systems:  A complete review of systems reviewed by me is negative with the  "exeption of what has been mentioned in the history of present illness.  In the last TWO WEEKS have you experienced any of the following symptoms?  Fevers: Yes  Night Sweats: Yes  Weight Gain: No  Pain at Night: No  Double Vision: No  Changes in Vision: Yes  Difficulty Breathing through Nose: Yes  Sore Throat in Morning: Yes  Dry Mouth in the Morning: No  Shortness of Breath Lying Flat: No  Shortness of Breath With Activity: Yes  Awakening with Shortness of Breath: No  Increased Cough: Yes  Heart Racing at Night: No  Swelling in Feet or Legs: No  Diarrhea at Night: No  Heartburn at Night: Yes  Urinating More than Once at Night: No  Losing Control of Urine at Night: No  Joint Pains at Night: Yes  Headaches in Morning: No  Weakness in Arms or Legs: No  Depressed Mood: Yes  Anxiety: Yes     Physical Examination:  Vitals: Ht 1.753 m (5' 9\")   Wt 124.7 kg (275 lb)   BMI 40.61 kg/m    BMI= Body mass index is 40.61 kg/m .         Physical Exam   Nursing note and vitals reviewed.  Constitutional: She appears well-developed. She appears healthy. She is cooperative.  Non-toxic appearance. No distress.   HENT:   Head: Normocephalic and atraumatic.   Right Ear: External ear normal.   Left Ear: External ear normal.   Nose: Nose normal.   Mouth/Throat: Oropharynx is clear.   Eyes: Conjunctivae are normal.   Pulmonary/Chest: Effort normal.   Abdominal: Normal appearance.   Musculoskeletal:         General: Normal range of motion.      Cervical back: Normal range of motion and neck supple.   Neurological: She is alert and oriented to person, place, and time.   Skin: She is not diaphoretic.   Psychiatric: Her behavior is normal. Mood, judgment and thought content normal.    Physical Exam  Vitals and nursing note reviewed.   Constitutional:       General: She is awake. She is not in acute distress.     Appearance: Normal appearance. She is well-developed and well-groomed. She is morbidly obese. She is not ill-appearing, " "toxic-appearing or diaphoretic.   HENT:      Head: Normocephalic and atraumatic.      Right Ear: External ear normal.      Left Ear: External ear normal.      Nose: Nose normal.      Mouth/Throat:      Pharynx: Oropharynx is clear.   Eyes:      Conjunctiva/sclera: Conjunctivae normal.   Pulmonary:      Effort: Pulmonary effort is normal.   Musculoskeletal:         General: Normal range of motion.      Cervical back: Normal range of motion and neck supple.   Neurological:      General: No focal deficit present.      Mental Status: She is alert and oriented to person, place, and time.   Psychiatric:         Mood and Affect: Mood normal.         Behavior: Behavior normal. Behavior is cooperative.         Thought Content: Thought content normal.         Judgment: Judgment normal.          Physical examination is limited by the nature of a virtual visit      All Labs Personally Reviewed               Data: All pertinent previous laboratory data reviewed     Recent Labs   Lab Test 03/25/24  1103 12/20/23  1557    140   POTASSIUM 4.3 3.8   CHLORIDE 101 105   CO2 24 24   ANIONGAP 13 11   * 113*   BUN 5.6* 8.0   CR 0.78 0.65   JENNIFER 9.0 9.1       Recent Labs   Lab Test 03/25/24  1103   WBC 8.1   RBC 4.76   HGB 13.1   HCT 39.5   MCV 83   MCH 27.5   MCHC 33.2   RDW 12.4          Recent Labs   Lab Test 03/25/24  1103   PROTTOTAL 8.0   ALBUMIN 4.1   BILITOTAL 0.5   ALKPHOS 68   AST 43   ALT 29       TSH (uIU/mL)   Date Value   07/14/2023 2.76       No results found for: \"UAMP\", \"UBARB\", \"BENZODIAZEUR\", \"UCANN\", \"UCOC\", \"OPIT\", \"UPCP\"    No results found for: \"IRONSAT\", \"SP01434\", \"JESUS\"    No results found for: \"PH\", \"PHARTERIAL\", \"PO2\", \"YA4KNOZFNCG\", \"SAT\", \"PCO2\", \"HCO3\", \"BASEEXCESS\", \"VERONIQUE\", \"BEB\"    @LABRCNTIPR(phv:4,pco2v:4,po2v:4,hco3v:4,michael:4,o2per:4)@    Echocardiology: No results found for this or any previous visit (from the past 4320 hour(s)).    Chest x-ray: No results found for this or any previous " "visit from the past 365 days.      Chest CT: No results found for this or any previous visit from the past 365 days.      PFT: Most Recent [facility] Pulmonary Function Testing    No results found for: \"20001\"  No results found for: \"20002\"  No results found for: \"20003\"  No results found for: \"20015\"  No results found for: \"20016\"  No results found for: \"20027\"  No results found for: \"20028\"  No results found for: \"20029\"  No results found for: \"20079\"  No results found for: \"20080\"  No results found for: \"20081\"  No results found for: \"20335\"  No results found for: \"20105\"  No results found for: \"20053\"  No results found for: \"20054\"  No results found for: \"20055\"      Nohemi Bajwa, ANDRES CNP 4/11/2024   Sleep Medicine    This note was written with the assistance of the Dragon voice-dictation technology software. The final document, although reviewed, may contain errors. For corrections, please contact the office.          "

## 2024-04-11 NOTE — NURSING NOTE
Is the patient currently in the state of [place]? YES    Visit mode:VIDEO    If the visit is dropped, the patient can be reconnected by: VIDEO VISIT: Send to e-mail at: [email or web address]    Will anyone else be joining the visit? NO  (If patient encounters technical issues they should call [phone]    How would you like to obtain your AVS? MyChart    Are changes needed to the allergy or medication list? Pt stated no changes to allergies and Pt stated no med changes  Has patient had flu shot for current/most recent flu season? If so, when? Yes: 11/10/2023    Reason for visit: Consult    Mary Kay ELLIS

## 2024-04-15 DIAGNOSIS — G43.709 CHRONIC MIGRAINE WITHOUT AURA WITHOUT STATUS MIGRAINOSUS, NOT INTRACTABLE: ICD-10-CM

## 2024-04-15 RX ORDER — PROPRANOLOL HCL 60 MG
60 CAPSULE, EXTENDED RELEASE 24HR ORAL DAILY
Qty: 90 CAPSULE | Refills: 1 | Status: SHIPPED | OUTPATIENT
Start: 2024-04-15 | End: 2024-07-29

## 2024-04-15 NOTE — TELEPHONE ENCOUNTER
Message to physician:     Date of last visit: 1/9/2024    Date of next visit if scheduled: None    Potassium   Date Value Ref Range Status   03/25/2024 4.3 3.4 - 5.3 mmol/L Final   05/27/2022 4.0 3.5 - 5.0 mmol/L Final     Creatinine   Date Value Ref Range Status   03/25/2024 0.78 0.51 - 0.95 mg/dL Final     GFR Estimate   Date Value Ref Range Status   03/25/2024 >90 >60 mL/min/1.73m2 Final       BP Readings from Last 3 Encounters:   03/25/24 111/78   01/09/24 126/85   11/13/23 102/72       Hemoglobin A1C   Date Value Ref Range Status   07/17/2023 6.3 (H) 0.0 - 5.6 % Final     Comment:     Normal <5.7%   Prediabetes 5.7-6.4%    Diabetes 6.5% or higher     Note: Adopted from ADA consensus guidelines.       Please complete refill and CLOSE ENCOUNTER.  Closing the encounter signifies the refill is complete.

## 2024-04-17 ENCOUNTER — MYC MEDICAL ADVICE (OUTPATIENT)
Dept: FAMILY MEDICINE | Facility: CLINIC | Age: 39
End: 2024-04-17
Payer: COMMERCIAL

## 2024-04-17 PROBLEM — K50.811 CROHN'S DISEASE OF BOTH SMALL AND LARGE INTESTINE WITH RECTAL BLEEDING (H): Status: ACTIVE | Noted: 2023-08-31

## 2024-04-19 NOTE — TELEPHONE ENCOUNTER
Forms Request:  Today's Date: April 19, 2024   Form Type: FMLA,   Where is the form from: Patient's job  How was form received: Claudy  TAMARA on file: NO   How is form being returned: Fax  Fax Number: Waiting for patient to provide  PCP: Dr. Groves  Color Team: Blue Team  Form Given to: Dr. Groves     yes

## 2024-05-02 ENCOUNTER — MYC MEDICAL ADVICE (OUTPATIENT)
Dept: FAMILY MEDICINE | Facility: CLINIC | Age: 39
End: 2024-05-02
Payer: COMMERCIAL

## 2024-05-02 NOTE — TELEPHONE ENCOUNTER
Faxed to [phone] unable to upload to [employer] as files do not go into [employer].   Ivon Davis, CMA

## 2024-05-06 ENCOUNTER — OFFICE VISIT (OUTPATIENT)
Dept: DERMATOLOGY | Facility: CLINIC | Age: 39
End: 2024-05-06
Payer: COMMERCIAL

## 2024-05-06 DIAGNOSIS — L82.1 SEBORRHEIC KERATOSES: ICD-10-CM

## 2024-05-06 DIAGNOSIS — D22.9 MULTIPLE PIGMENTED NEVI: ICD-10-CM

## 2024-05-06 DIAGNOSIS — Z12.83 SKIN CANCER SCREENING: Primary | ICD-10-CM

## 2024-05-06 DIAGNOSIS — L81.4 SOLAR LENTIGINOSIS: ICD-10-CM

## 2024-05-06 DIAGNOSIS — D18.01 CHERRY ANGIOMA: ICD-10-CM

## 2024-05-06 DIAGNOSIS — B36.0 TV (TINEA VERSICOLOR): ICD-10-CM

## 2024-05-06 DIAGNOSIS — K50.811 CROHN'S DISEASE OF BOTH SMALL AND LARGE INTESTINE WITH RECTAL BLEEDING (H): ICD-10-CM

## 2024-05-06 PROCEDURE — 99203 OFFICE O/P NEW LOW 30 MIN: CPT | Performed by: PHYSICIAN ASSISTANT

## 2024-05-06 RX ORDER — KETOCONAZOLE 20 MG/G
CREAM TOPICAL DAILY
Qty: 60 G | Refills: 3 | Status: SHIPPED | OUTPATIENT
Start: 2024-05-06

## 2024-05-06 ASSESSMENT — PAIN SCALES - GENERAL: PAINLEVEL: NO PAIN (0)

## 2024-05-06 NOTE — NURSING NOTE
Dermatology Rooming Note    Lou Treviño's goals for this visit include:   Chief Complaint   Patient presents with    Skin Check     FBSE.  Started on the immunosuppressant      Catrina Whitt CMA

## 2024-05-06 NOTE — LETTER
5/6/2024       RE: Lou Treviño  [address]  [place] [address]     Dear Colleague,    Thank you for referring your patient, Lou Treviño, to the [facility] [place] at [facility]. Please see a copy of my visit note below.    [facility] Dermatology Note  Encounter Date: May 6, 2024  Office Visit         Dermatology Problem List:  Skin exam: 5/6/2024  1. Skin cancer screening  2. Cherry angiomas  3. Tinea versicolor  - current tx: ketoconazole cream      ____________________________________________    Assessment & Plan:    # Skin cancer screening with multiple benign nevi, solar lentigines    - ABCDEs: Counseled ABCDEs of melanoma: Asymmetry, Border (irregularity), Color (not uniform, changes in color), Diameter (greater than 6 mm which is about the size of a pencil eraser), and Evolving (any changes in preexisting moles).  - Sun protection: Counseled SPF30+ sunscreen, UPF clothing, sun avoidance, tanning bed avoidance.    # Cherry angiomas and seborrheic keratoses,  Benign, reassurance given.        # Tinea versicolor-left lower axilla/chest wall   - Start ketoconazole cream dialy until clear    # hx of hand xerosis  Discussed gentle skin cares, avoiding soaking hands in water and moisturizing after washing.  Recommend Vaseline /Aquaphor with white cotton gloves at bedtime when active.   Samples of Aquaphor given.    Procedures Performed:   None      Follow-up: 1 year(s) in-person, or earlier for new or changing lesions    Staff and Scribe:     Scribe Disclosure:   I, Felipa Mart, am serving as a scribe; to document services personally performed by Charlotte Phillip PA-C -based on data collection and the provider's statements to me.     Provider Disclosure:  I agree with above History, Review of Systems, Physical exam and Plan.  I have reviewed the content of the documentation and have edited it as  needed. I have personally performed the services documented here and the documentation accurately represents those services and the decisions I have made.      Electronically signed by:  All risk, benefits and alternatives were discussed with patient.  Patient is in agreement and understands the assessment and plan.  All questions were answered.  Sun Screen Education was given.   Return to Clinic annually or sooner as needed.   Charlotte Phillip PA-C    ____________________________________________    CC: Skin Check (FBSE.  Started on the immunosuppressant )    HPI:  Ms. Lou Treviño is a(n) 38 year old female who presents today as a new patient for skin check    She reports that she is taking Skyrizi for her crohn's,  which she started on in 11/2023. She is unsure, but her maternal grandmother may have had lupus and rosacea.       Patient is otherwise feeling well, without additional skin concerns.    Labs Reviewed:  N/A    Physical Exam:  Vitals: There were no vitals taken for this visit.  SKIN: Full skin, which includes the head/face, both arms, chest, back, abdomen,both legs, genitalia and/or groin buttocks, digits and/or nails, was examined.  - There are dome shaped bright red papules on the head/neck, trunk, extremities.   - Multiple regular brown pigmented macules and papules are identified on the head/neck, trunk, extremities.   - Scattered brown macules on sun exposed areas.  - There are waxy stuck on tan to brown papules on the head/neck, trunk, extremities.   - tan flaky thin tan papules on left chest/axilla  - No other lesions of concern on areas examined.     Medications:  Current Outpatient Medications   Medication Sig Dispense Refill    budesonide (PULMICORT FLEXHALER) 90 MCG/ACT inhaler Inhale 2 puffs into the lungs 2 times daily 1 each 3    cetirizine (ZYRTEC) 10 MG tablet Take 1 tablet (10 mg) by mouth daily 90 tablet 1    citalopram (CELEXA) 20 MG tablet Take 1 tablet (20 mg) by mouth daily 90  tablet 1    famotidine (PEPCID) 20 MG tablet Take 1 tablet (20 mg) by mouth 2 times daily 180 tablet 0    fluticasone (FLONASE) 50 MCG/ACT nasal spray Spray 1 spray into both nostrils daily 18.2 mL 0    fluticasone-salmeterol (ADVAIR HFA) 115-21 MCG/ACT inhaler Inhale 2 puffs into the lungs 2 times daily      levalbuterol (XOPENEX HFA) 45 MCG/ACT inhaler Inhale 1-2 puffs into the lungs every 6 hours as needed for wheezing 15 g 1    multivitamin w/minerals (MULTI-VITAMIN) tablet Take 1 tablet by mouth daily      norethindrone-ethinyl estradiol (ORTHO-NOVUM) 1-35 MG-MCG tablet Take 1 tablet by mouth daily 84 tablet 3    propranolol ER (INDERAL LA) 60 MG 24 hr capsule Take 1 capsule (60 mg) by mouth daily 90 capsule 1    rimegepant (NURTEC) 75 MG ODT tablet Take 1 tablet (75 mg) by mouth every 48 hours 45 tablet 1    Risankizumab-rzaa (SKYRIZI) 360 MG/2.4ML SOCT Inject 2.4 mLs (360 mg) Subcutaneous once every eight weeks 2.4 mL 2     No current facility-administered medications for this visit.      Past Medical History:   Patient Active Problem List   Diagnosis    Mild persistent asthma    Migraines    IBS (irritable bowel syndrome)    Anxiety and depression    Allergies    History of intussusception    Family history of factor V deficiency    Generalized anxiety disorder    Healthcare maintenance    Prediabetes    Morbid obesity (H)    Nausea    Hypoalbuminemia    Hepatic steatosis    Left knee pain    Elevated fecal calprotectin    Crohn's disease of both small and large intestine with rectal bleeding (H)     Past Medical History:   Diagnosis Date    Migraine     Noninfectious ileitis     Uncomplicated asthma         CC Aiyana Ivey MD  [address] [address] on close of this encounter.

## 2024-05-06 NOTE — PATIENT INSTRUCTIONS
Gentle Skin Care    Below is a list of products our providers recommend for gentle skin care.    Moisturizers:  Lighter: Exederm Intensive Moisture Cream, Cetaphil Cream, CeraVe, Aveeno Positively Radiant and Vanicream Light   Thicker: Aquaphor Ointment, Vaseline, Petroleum Jelly, Eucerin Original Healing Cream, Vanicream Cream, CeraVe Healing Ointment, Aquaphor Body Spray  Avoid Lotions (too thin)  Mild Cleansers:  Dove Fragrance-free Bar or Wash  CeraVe   Vanicream Cleansing Bar  Cetaphil Cleanser   Aquaphor 2-in-1 Gentle Wash and Shampoo  Dove Baby Wash  Exederm Body Wash       Laundry Products:  All Free and Clear Products  Cheer Free  Generic brands are okay as long as they are fragrance-free  Avoid fabric softeners and dryer sheets   Sunscreens: SPF 30 or greater   Sunscreens that contain zinc oxide and/or titanium dioxide should be applied. These are physical blockers. One or both of these should be listed in the active Ingredients.  Any other listed ingredients under the active ingredients would be a chemically-based sunscreen, which might be irritating.  Spray sunscreens should be avoided because these are typically chemical sunscreens.      Shampoo and Conditioners:  Free and Clear by Vanicream  Aquaphor 2-in-1 Gentle Wash and Shampoo   Oils:  Mineral Oil   Emu Oil   For some patients: coconut (raw, unrefined, organic) and sunflower seed oil      Keep in mind:  Generic products are an okay substitute, but make sure they are fragrance-free.  Reading the product ingredients list is very important.  Avoid product that have fragrance added to them.   Organic does not mean fragrance-free. In fact, patients with sensitive skin can become quite irritated by some organic products.     Recommendations:  Daily bathing. Make sure you are applying a good moisturizer after bathing every time.  Apply moisturizing creams at least twice daily to the whole body. Your provider may recommend a lighter or heavier  moisturizer based on the severity of your skin condition and that time of year it is.  Creams are more moisturizing than lotions.     Care Plan:  Keep bathing and showering short, less than 15 minutes.  Always use lukewarm warm when possible. AVOID hot or cold water.  DO NOT use bubble bath.  Limit the use of soaps. Focus on skin folds, face, armpits, groin, and feet towards the end of the bath.  Do NOT vigorously scrub when you cleanse the skin.  After bathing, PAT your skin lightly with a towel. DO NOT rub or scrub when drying.  ALWAYS apply a moisturizer immediately after bathing. This helps to lock in moisture. *IF YOU WERE PRESCRIBED A TOPICAL MEDICATION, APPLY YOUR MEDICATION FIRST, AND THEN COVER WITH YOUR DAILY MOISTURIZER.*  Reapply moisturizing agents to your whole body at least twice daily.    Other helpful tips:  Do not use products such as powders, perfumes, or colognes on your skin.  Diffusers can be harsh on sensitive skin. Use with caution if you have sensitive skin.  Avoid saunas and steam baths. This temperature is too hot.  Avoid tight or scratchy clothing, such as wool.  Always wash new clothing before wearing them for the first time.  Sometimes a humidifier or vaporizer can be used at night can help the dry skin. Remember to keep these items clean to avoid mold growth.    Who should I call with questions?  [facility]: [phone]  [facility]: [phone]  For urgent needs outside of business hours call the [facility] at [phone] and ask for the dermatology resident on call.

## 2024-05-29 ASSESSMENT — SLEEP AND FATIGUE QUESTIONNAIRES
HOW LIKELY ARE YOU TO NOD OFF OR FALL ASLEEP WHILE SITTING QUIETLY AFTER LUNCH WITHOUT ALCOHOL: SLIGHT CHANCE OF DOZING
HOW LIKELY ARE YOU TO NOD OFF OR FALL ASLEEP IN A CAR, WHILE STOPPED FOR A FEW MINUTES IN TRAFFIC: WOULD NEVER DOZE
HOW LIKELY ARE YOU TO NOD OFF OR FALL ASLEEP WHILE SITTING INACTIVE IN A PUBLIC PLACE: WOULD NEVER DOZE
HOW LIKELY ARE YOU TO NOD OFF OR FALL ASLEEP WHILE LYING DOWN TO REST IN THE AFTERNOON WHEN CIRCUMSTANCES PERMIT: HIGH CHANCE OF DOZING
HOW LIKELY ARE YOU TO NOD OFF OR FALL ASLEEP WHILE SITTING AND READING: SLIGHT CHANCE OF DOZING
HOW LIKELY ARE YOU TO NOD OFF OR FALL ASLEEP WHEN YOU ARE A PASSENGER IN A CAR FOR AN HOUR WITHOUT A BREAK: SLIGHT CHANCE OF DOZING
HOW LIKELY ARE YOU TO NOD OFF OR FALL ASLEEP WHILE SITTING AND TALKING TO SOMEONE: WOULD NEVER DOZE
HOW LIKELY ARE YOU TO NOD OFF OR FALL ASLEEP WHILE WATCHING TV: MODERATE CHANCE OF DOZING

## 2024-05-30 ENCOUNTER — OFFICE VISIT (OUTPATIENT)
Dept: SLEEP MEDICINE | Facility: CLINIC | Age: 39
End: 2024-05-30
Attending: NURSE PRACTITIONER
Payer: COMMERCIAL

## 2024-05-30 DIAGNOSIS — R29.818 SUSPECTED SLEEP APNEA: ICD-10-CM

## 2024-05-30 PROCEDURE — G0399 HOME SLEEP TEST/TYPE 3 PORTA: HCPCS | Performed by: INTERNAL MEDICINE

## 2024-05-31 ENCOUNTER — DOCUMENTATION ONLY (OUTPATIENT)
Dept: SLEEP MEDICINE | Facility: CLINIC | Age: 39
End: 2024-05-31
Attending: NURSE PRACTITIONER
Payer: COMMERCIAL

## 2024-05-31 NOTE — PROGRESS NOTES
HST POST-STUDY QUESTIONNAIRE    What time did you go to bed?  11 pm  How long do you think it took to fall asleep?  30 min?  What time did you wake up to start the day?  7, took off at 7:15  Did you get up during the night at all?  no  If you woke up, do you remember approximately what time(s)? once  Did you have any difficulty with the equipment?  Yes uncomfortable, took longer to fall asleep  Did you us any type of treatment with this study?  None  Was the head of the bed elevated? No  Did you sleep in a recliner?  No  Did you stop using CPAP at least 3 days before this test?  NA  Any other information you'd like us to know? no

## 2024-06-06 NOTE — PROGRESS NOTES
This HSAT was performed using a Noxturnal T3 device which recorded snore, sound, movement activity, body position, nasal pressure, oronasal thermal airflow, pulse, oximetry and both chest and abdominal respiratory effort. HSAT data was restricted to the time patient states they were in bed.     HSAT was scored using 1B 4% hypopnea rule.     HST AHI (Non-PAT): 4.7  Snoring was reported as moderate.  Time with SpO2 below 89% was 0 minutes.   Overall signal quality was good     Pt will follow up with sleep provider to determine appropriate therapy.

## 2024-06-06 NOTE — PROCEDURES
"HOME SLEEP STUDY INTERPRETATION    Patient: Lou Treviño  MRN: [id number]  YOB: 1985  Study Date: 5/30/2024  Referring Provider: Silvia Groves MD  Ordering Provider: Nohemi CAMPOS CNP     Indications for Home Study: Lou Treviño is a 38 year old female who presents with symptoms suggestive of obstructive sleep apnea.    Estimated body mass index is 40.61 kg/m  as calculated from the following:    Height as of 4/11/24: 1.753 m (5' 9\").    Weight as of 4/11/24: 124.7 kg (275 lb).  Total score - [place]: 8 (5/29/2024  8:13 AM)  Total Score: 4 (5/29/2024  8:13 AM)    Data: A full night home sleep study was performed recording the standard physiologic parameters including body position, movement, sound, nasal pressure, thermal oral airflow, chest and abdominal movements with respiratory inductance plethysmography, and oxygen saturation by pulse oximetry. Pulse rate was estimated by oximetry recording. This study was considered adequate based on > 4 hours of quality oximetry and respiratory recording. As specified by the AASM Manual for the Scoring of Sleep and Associated events, version 2.3, Rule VIII.D 1B, 4% oxygen desaturation scoring for hypopneas is used as a standard of care on all home sleep apnea testing.    Analysis Time:  492.2 minutes    Respiration:   Sleep Associated Hypoxemia: sustained hypoxemia was not present. Baseline oxygen saturation was 96%.  Time with saturation less than or equal to 89% was 0 minutes. The lowest oxygen saturation was 89%.   Snoring: Snoring was present.  Respiratory events: The home study revealed a presence of 2 obstructive apneas and 12 mixed and central apneas. There were 19 hypopneas resulting in a combined apnea/hypopnea index [AHI] of 4.7 events per hour.  AHI was 36 per hour supine, 3.2 per hour prone, 6.2 per hour on left side, and 4.5 per hour on right side.   Pattern: Excluding events noted above, respiratory rate and pattern was " Normal.    Position: Percent of time spent: supine - 0.7%, prone - 37.8%, on left - 23.6%, on right - 24.2%.    Heart Rate: By pulse oximetry normal rate was noted.     Assessment:   Patient did not rule in for obstructive sleep apnea.  Sleep associated hypoxemia was not present.    Recommendations:  Consider positional therapy.  Suggest optimizing sleep hygiene and avoiding sleep deprivation.  Weight management.    Diagnosis Code(s): Snoring R06.83    Bobo Bentley DO, June 6, 2024   Diplomate, American Board of Internal Medicine, Sleep Medicine

## 2024-06-08 ENCOUNTER — TELEPHONE (OUTPATIENT)
Dept: SLEEP MEDICINE | Facility: CLINIC | Age: 39
End: 2024-06-08
Payer: COMMERCIAL

## 2024-06-08 NOTE — TELEPHONE ENCOUNTER
Message left for patient that she does not have sleep apnea.  Overall AHI revealed she has 4.7 events/hour which does not indicate sleep apnea.  She does however have positional events greater than 30 when she is supine.  For that reason I will call her again at the beginning of next week when we can discuss positional therapy.

## 2024-06-13 ENCOUNTER — LAB (OUTPATIENT)
Dept: LAB | Facility: CLINIC | Age: 39
End: 2024-06-13
Payer: COMMERCIAL

## 2024-06-13 DIAGNOSIS — K50.811 CROHN'S DISEASE OF BOTH SMALL AND LARGE INTESTINE WITH RECTAL BLEEDING (H): ICD-10-CM

## 2024-06-13 LAB
BASOPHILS # BLD AUTO: 0.1 10E3/UL (ref 0–0.2)
BASOPHILS NFR BLD AUTO: 1 %
EOSINOPHIL # BLD AUTO: 0.3 10E3/UL (ref 0–0.7)
EOSINOPHIL NFR BLD AUTO: 3 %
ERYTHROCYTE [DISTWIDTH] IN BLOOD BY AUTOMATED COUNT: 12.4 % (ref 10–15)
HCT VFR BLD AUTO: 38 % (ref 35–47)
HGB BLD-MCNC: 12 G/DL (ref 11.7–15.7)
IMM GRANULOCYTES # BLD: 0 10E3/UL
IMM GRANULOCYTES NFR BLD: 0 %
LYMPHOCYTES # BLD AUTO: 3.4 10E3/UL (ref 0.8–5.3)
LYMPHOCYTES NFR BLD AUTO: 38 %
MCH RBC QN AUTO: 27.6 PG (ref 26.5–33)
MCHC RBC AUTO-ENTMCNC: 31.6 G/DL (ref 31.5–36.5)
MCV RBC AUTO: 88 FL (ref 78–100)
MONOCYTES # BLD AUTO: 0.8 10E3/UL (ref 0–1.3)
MONOCYTES NFR BLD AUTO: 9 %
NEUTROPHILS # BLD AUTO: 4.4 10E3/UL (ref 1.6–8.3)
NEUTROPHILS NFR BLD AUTO: 50 %
PLATELET # BLD AUTO: 268 10E3/UL (ref 150–450)
RBC # BLD AUTO: 4.34 10E6/UL (ref 3.8–5.2)
WBC # BLD AUTO: 9 10E3/UL (ref 4–11)

## 2024-06-13 PROCEDURE — 36415 COLL VENOUS BLD VENIPUNCTURE: CPT

## 2024-06-13 PROCEDURE — 86140 C-REACTIVE PROTEIN: CPT

## 2024-06-13 PROCEDURE — 80053 COMPREHEN METABOLIC PANEL: CPT

## 2024-06-13 PROCEDURE — 85025 COMPLETE CBC W/AUTO DIFF WBC: CPT

## 2024-06-13 NOTE — TELEPHONE ENCOUNTER
Called and spoke to patient. Pt wanted to come in today so I had her scheduled today at 1:30pm. Can you release the lab for today instead of tomorrow or after?    Thank you,     KRYSTINA GARCIA  
Discussed with Lisy - will call patient to come back in to recollect stool since several tests were not processed correctly. Need to release fecal calprotectin and stool O & P and repeat stool culture and fecal occult blood. Will await results.     Silvia Groves MD   
Declines

## 2024-06-14 LAB
ALBUMIN SERPL BCG-MCNC: 4 G/DL (ref 3.5–5.2)
ALP SERPL-CCNC: 57 U/L (ref 40–150)
ALT SERPL W P-5'-P-CCNC: 13 U/L (ref 0–50)
ANION GAP SERPL CALCULATED.3IONS-SCNC: 11 MMOL/L (ref 7–15)
AST SERPL W P-5'-P-CCNC: 18 U/L (ref 0–45)
BILIRUB SERPL-MCNC: 0.3 MG/DL
BUN SERPL-MCNC: 8 MG/DL (ref 6–20)
CALCIUM SERPL-MCNC: 8.9 MG/DL (ref 8.6–10)
CHLORIDE SERPL-SCNC: 104 MMOL/L (ref 98–107)
CREAT SERPL-MCNC: 0.6 MG/DL (ref 0.51–0.95)
CRP SERPL-MCNC: 14.6 MG/L
DEPRECATED HCO3 PLAS-SCNC: 23 MMOL/L (ref 22–29)
EGFRCR SERPLBLD CKD-EPI 2021: >90 ML/MIN/1.73M2
GLUCOSE SERPL-MCNC: 100 MG/DL (ref 70–99)
POTASSIUM SERPL-SCNC: 4 MMOL/L (ref 3.4–5.3)
PROT SERPL-MCNC: 7.2 G/DL (ref 6.4–8.3)
SODIUM SERPL-SCNC: 138 MMOL/L (ref 135–145)

## 2024-06-16 ENCOUNTER — HEALTH MAINTENANCE LETTER (OUTPATIENT)
Age: 39
End: 2024-06-16

## 2024-07-29 ENCOUNTER — MYC REFILL (OUTPATIENT)
Dept: FAMILY MEDICINE | Facility: CLINIC | Age: 39
End: 2024-07-29
Payer: COMMERCIAL

## 2024-07-29 DIAGNOSIS — G43.709 CHRONIC MIGRAINE WITHOUT AURA WITHOUT STATUS MIGRAINOSUS, NOT INTRACTABLE: ICD-10-CM

## 2024-07-29 DIAGNOSIS — F32.A ANXIETY AND DEPRESSION: ICD-10-CM

## 2024-07-29 DIAGNOSIS — F41.9 ANXIETY AND DEPRESSION: ICD-10-CM

## 2024-07-30 RX ORDER — CITALOPRAM HYDROBROMIDE 20 MG/1
20 TABLET ORAL DAILY
Qty: 90 TABLET | Refills: 0 | Status: SHIPPED | OUTPATIENT
Start: 2024-07-30 | End: 2024-08-06

## 2024-07-30 RX ORDER — PROPRANOLOL HCL 60 MG
60 CAPSULE, EXTENDED RELEASE 24HR ORAL DAILY
Qty: 90 CAPSULE | Refills: 1 | Status: SHIPPED | OUTPATIENT
Start: 2024-07-30 | End: 2024-08-06

## 2024-07-30 NOTE — TELEPHONE ENCOUNTER
90 day refill for celexa per protocol. Patient requires an appointment for reassessment, and needs updated PHQ-9. Mchart message sent to patient requesting they schedule with PCP in the next 90 days.Deni SAN

## 2024-07-31 ENCOUNTER — TELEPHONE (OUTPATIENT)
Dept: GASTROENTEROLOGY | Facility: CLINIC | Age: 39
End: 2024-07-31
Payer: COMMERCIAL

## 2024-07-31 NOTE — TELEPHONE ENCOUNTER
Pt is due for 3 mo check in with Crista ROLLE    Call placed to pt to initiate scheduling on 7/31    Outcome: Pt is scheduled on 8/13 at 12:00pm for a phone visit with Crista

## 2024-08-05 ASSESSMENT — PATIENT HEALTH QUESTIONNAIRE - PHQ9
SUM OF ALL RESPONSES TO PHQ QUESTIONS 1-9: 4
SUM OF ALL RESPONSES TO PHQ QUESTIONS 1-9: 4
10. IF YOU CHECKED OFF ANY PROBLEMS, HOW DIFFICULT HAVE THESE PROBLEMS MADE IT FOR YOU TO DO YOUR WORK, TAKE CARE OF THINGS AT HOME, OR GET ALONG WITH OTHER PEOPLE: SOMEWHAT DIFFICULT

## 2024-08-05 ASSESSMENT — ANXIETY QUESTIONNAIRES
1. FEELING NERVOUS, ANXIOUS, OR ON EDGE: SEVERAL DAYS
4. TROUBLE RELAXING: NOT AT ALL
IF YOU CHECKED OFF ANY PROBLEMS ON THIS QUESTIONNAIRE, HOW DIFFICULT HAVE THESE PROBLEMS MADE IT FOR YOU TO DO YOUR WORK, TAKE CARE OF THINGS AT HOME, OR GET ALONG WITH OTHER PEOPLE: VERY DIFFICULT
GAD7 TOTAL SCORE: 5
5. BEING SO RESTLESS THAT IT IS HARD TO SIT STILL: NOT AT ALL
7. FEELING AFRAID AS IF SOMETHING AWFUL MIGHT HAPPEN: SEVERAL DAYS
GAD7 TOTAL SCORE: 5
7. FEELING AFRAID AS IF SOMETHING AWFUL MIGHT HAPPEN: SEVERAL DAYS
GAD7 TOTAL SCORE: 5
3. WORRYING TOO MUCH ABOUT DIFFERENT THINGS: SEVERAL DAYS
6. BECOMING EASILY ANNOYED OR IRRITABLE: MORE THAN HALF THE DAYS
8. IF YOU CHECKED OFF ANY PROBLEMS, HOW DIFFICULT HAVE THESE MADE IT FOR YOU TO DO YOUR WORK, TAKE CARE OF THINGS AT HOME, OR GET ALONG WITH OTHER PEOPLE?: VERY DIFFICULT
2. NOT BEING ABLE TO STOP OR CONTROL WORRYING: NOT AT ALL

## 2024-08-05 ASSESSMENT — ASTHMA QUESTIONNAIRES
ACT_TOTALSCORE: 21
QUESTION_4 LAST FOUR WEEKS HOW OFTEN HAVE YOU USED YOUR RESCUE INHALER OR NEBULIZER MEDICATION (SUCH AS ALBUTEROL): ONCE A WEEK OR LESS
QUESTION_2 LAST FOUR WEEKS HOW OFTEN HAVE YOU HAD SHORTNESS OF BREATH: ONCE OR TWICE A WEEK
ACT_TOTALSCORE: 21
QUESTION_5 LAST FOUR WEEKS HOW WOULD YOU RATE YOUR ASTHMA CONTROL: WELL CONTROLLED
QUESTION_3 LAST FOUR WEEKS HOW OFTEN DID YOUR ASTHMA SYMPTOMS (WHEEZING, COUGHING, SHORTNESS OF BREATH, CHEST TIGHTNESS OR PAIN) WAKE YOU UP AT NIGHT OR EARLIER THAN USUAL IN THE MORNING: NOT AT ALL
QUESTION_1 LAST FOUR WEEKS HOW MUCH OF THE TIME DID YOUR ASTHMA KEEP YOU FROM GETTING AS MUCH DONE AT WORK, SCHOOL OR AT HOME: A LITTLE OF THE TIME

## 2024-08-06 ENCOUNTER — VIRTUAL VISIT (OUTPATIENT)
Dept: FAMILY MEDICINE | Facility: CLINIC | Age: 39
End: 2024-08-06
Payer: COMMERCIAL

## 2024-08-06 DIAGNOSIS — J45.30 MILD PERSISTENT ASTHMA WITHOUT COMPLICATION: ICD-10-CM

## 2024-08-06 DIAGNOSIS — F41.1 GENERALIZED ANXIETY DISORDER: Primary | ICD-10-CM

## 2024-08-06 DIAGNOSIS — R73.03 PREDIABETES: ICD-10-CM

## 2024-08-06 DIAGNOSIS — R45.89 DEPRESSED MOOD: ICD-10-CM

## 2024-08-06 DIAGNOSIS — R53.83 FATIGUE, UNSPECIFIED TYPE: ICD-10-CM

## 2024-08-06 DIAGNOSIS — E66.813 CLASS 3 SEVERE OBESITY WITHOUT SERIOUS COMORBIDITY WITH BODY MASS INDEX (BMI) OF 40.0 TO 44.9 IN ADULT, UNSPECIFIED OBESITY TYPE (H): ICD-10-CM

## 2024-08-06 DIAGNOSIS — G43.709 CHRONIC MIGRAINE WITHOUT AURA WITHOUT STATUS MIGRAINOSUS, NOT INTRACTABLE: ICD-10-CM

## 2024-08-06 DIAGNOSIS — E66.01 CLASS 3 SEVERE OBESITY WITHOUT SERIOUS COMORBIDITY WITH BODY MASS INDEX (BMI) OF 40.0 TO 44.9 IN ADULT, UNSPECIFIED OBESITY TYPE (H): ICD-10-CM

## 2024-08-06 PROCEDURE — G2211 COMPLEX E/M VISIT ADD ON: HCPCS | Mod: 95 | Performed by: FAMILY MEDICINE

## 2024-08-06 PROCEDURE — 99214 OFFICE O/P EST MOD 30 MIN: CPT | Mod: 95 | Performed by: FAMILY MEDICINE

## 2024-08-06 RX ORDER — CITALOPRAM HYDROBROMIDE 20 MG/1
20 TABLET ORAL DAILY
Qty: 90 TABLET | Refills: 3 | Status: SHIPPED | OUTPATIENT
Start: 2024-08-06

## 2024-08-06 RX ORDER — LEVALBUTEROL TARTRATE 45 UG/1
1-2 AEROSOL, METERED ORAL EVERY 6 HOURS PRN
Qty: 15 G | Refills: 1 | Status: SHIPPED | OUTPATIENT
Start: 2024-08-06

## 2024-08-06 RX ORDER — BUPROPION HYDROCHLORIDE 150 MG/1
150 TABLET ORAL EVERY MORNING
Qty: 90 TABLET | Refills: 1 | Status: SHIPPED | OUTPATIENT
Start: 2024-08-06 | End: 2024-09-24 | Stop reason: DRUGHIGH

## 2024-08-06 RX ORDER — PROPRANOLOL HCL 60 MG
60 CAPSULE, EXTENDED RELEASE 24HR ORAL DAILY
Qty: 90 CAPSULE | Refills: 1 | Status: SHIPPED | OUTPATIENT
Start: 2024-08-06

## 2024-08-06 NOTE — PATIENT INSTRUCTIONS
- Continue your citalopram. Refills sent  - Start Wellbutrin 150 mg each morning. This can disrupt sleep or make anxiety worse, but can also help with energy and focus for many. If you are tolerating it, we could increase to 300 mg per day in a few weeks.   - Get your hemoglobin A1c drawn with your next lab tests.  -  the prescription for the Nurtec as a migraine treatment med. Continue the propranolol.  - Follow up in 3 months for a physical and a pap smear. We will be due to check on your breathing & mood/anxiety again then, too.

## 2024-08-06 NOTE — PROGRESS NOTES
Valorie is a 38 year old who is being evaluated via a billable video visit.      Assessment & Plan     Generalized anxiety disorder  Long term anxiety, most consistent with ESTEFANI. Fairly well controlled on current citalopram therapy. Has some occasional sleep disruption with difficulty quieting her thoughts.   - citalopram (CELEXA) 20 MG tablet; Take 1 tablet (20 mg) by mouth daily  - Discussed sleep hygiene - getting out of bed if not quickly falling asleep, reserving bed for only sleep & sex, etc.     Depressed mood  Long-term low mood, anhedonia, and increased desire for sleep with fatigue. Has been on citalopram for many years with some benefit. Question whether dysthymia is the more appropriate diagnosis here. Sleep disorder is less likely given reassuring sleep study as below.   - citalopram (CELEXA) 20 MG tablet; Take 1 tablet (20 mg) by mouth daily  - Trial of augmentation with buPROPion (WELLBUTRIN XL) 150 MG 24 hr tablet; Take 1 tablet (150 mg) by mouth every morning. Could increase to 300 mg daily if tolerating via [employer] message before 3 month follow up. Reach out if side effects/not tolerating.    Fatigue, unspecified type  Chronic. Given snoring history, concern for ERIC was high but sleep study recently did not rule this in. Normal TSH last year. Recent CBC and CMP all unremarkable. No changes with improvement in IBD symptom control. May be due to dysthymia above.  - Work on position changes with sleep and sleep hygiene.   - citalopram (CELEXA) 20 MG tablet; Take 1 tablet (20 mg) by mouth daily  - buPROPion (WELLBUTRIN XL) 150 MG 24 hr tablet; Take 1 tablet (150 mg) by mouth every morning    Prediabetes  History of this. Due for annual A1c.   - Hemoglobin A1c; Future  - Could be a metformin or GLP-1 candidate. Will discuss at next visit.    Class 3 severe obesity without serious comorbidity with body mass index (BMI) of 40.0 to 44.9 in adult, unspecified obesity type (H)  BMI of 40 in April 2024. Had  "gained quite a bit of weight on prednisone for Crohn's disease treatment. Working on lifestyle changes, but limited in dietary changes due to short gut and hasn't found working with a dietician helpful in the past.   - Med adjustments as above  - Reassess A1c as above  - Continued work on lifestyle factors  - Follow up in 3 months. Would be a candidate for medication therapy if interested.     Chronic migraine without aura without status migrainosus, not intractable  Symptoms well controlled on propranolol. Would be open to trying Nurtec again for symptomatic relief if possible with insurance coverage.  - propranolol ER (INDERAL LA) 60 MG 24 hr capsule; Take 1 capsule (60 mg) by mouth daily  - rimegepant (NURTEC) 75 MG ODT tablet; Place 1 tablet (75 mg) under the tongue daily as needed for migraine Maximum of 1 tablet every 24 hours.    Mild persistent asthma without complication  Symptoms well controlled. Rare use of TAMARA. Not requiring a daily controller at this time. Did not have insurance coverage for SMART therapy.  - levalbuterol (XOPENEX HFA) 45 MCG/ACT inhaler; Inhale 1-2 puffs into the lungs every 6 hours as needed for wheezing    BMI  Estimated body mass index is 40.61 kg/m  as calculated from the following:    Height as of 4/11/24: 1.753 m (5' 9\").    Weight as of 4/11/24: 124.7 kg (275 lb).   Weight management plan: Discussed healthy diet and exercise guidelines    Follow up in 3 months for preventive health exam, asthma reassessment, and recheck on mood/fatigue.    The longitudinal plan of care for the diagnosis(es)/condition(s) as documented were addressed during this visit. Due to the added complexity in care, I will continue to support Valorie in the subsequent management and with ongoing continuity of care.    Subjective   Valorie is a 38 year old, presenting for the following health issues:  Recheck Medication (Follow up Mental Health meds no other concerns)        8/6/2024     4:12 PM   Additional " Questions   Roomed by Sonal'         8/6/2024    Information    services provided? No        HPI     Visit start time: 4:17 PM    Feeling pretty good. Less bowel issues now. Much better since off prednisone this spring in April.     Breathing is much better since off prednisone. She used her rescue inhaler when she went on vacation in [place] in June when she was more active. Otherwise doesn't typically need it. Not taking a daily med any longer for a controller.    Anxiety has been up and down. Getting older. Seeing more gray hair. Much better since off prednisone. Some insomnia due to difficulty turning off her brain at bedtime a couple nights per month. Things have leveled out at work. Good relationship with her  and her family/friends.     Abrupt disruption in citalopram in late July when she ran out. Noted more low mood, low motivation, and increased sleeping desire. Low desire to eat. Has been tired since childhood with low level mood. No clear answers on what causes this. Tried a lot of different meds as a young person without clear benefit.     Sleep test didn't find significant apnea except when on the back. Recommended avoiding back sleeping position.     Eating better. Trying to snack on less junk food and fried foods. Doesn't tolerate a lot of higher fiber foods due to short gut. Had a lot of weight gain earlier in the year when on prednisone and eating differently with stress.        Objective         Vitals:  No vitals were obtained today due to virtual visit.    Physical Exam   GENERAL: alert and no distress  EYES: Eyes grossly normal to inspection.  No discharge or erythema, or obvious scleral/conjunctival abnormalities.  RESP: No audible wheeze, cough, or visible cyanosis.    SKIN: Visible skin clear. No significant rash, abnormal pigmentation or lesions.  NEURO: Cranial nerves grossly intact.  Mentation and speech appropriate for age.  PSYCH: Appropriate affect, tone, and  pace of words    Answers submitted by the patient for this visit:  Patient Health Questionnaire (Submitted on 8/5/2024)  If you checked off any problems, how difficult have these problems made it for you to do your work, take care of things at home, or get along with other people?: Somewhat difficult  PHQ9 TOTAL SCORE: 4  ESTEFANI-7 (Submitted on 8/5/2024)  ESTEFANI 7 TOTAL SCORE: 5        Video-Visit Details    Type of service:  Video Visit   Video end time: 4:56 PM  Originating Location (pt. Location): Home  Distant Location (provider location):  On-site  Platform used for Video Visit: Jay  Signed Electronically by: Silvia Groves MD

## 2024-08-13 ENCOUNTER — VIRTUAL VISIT (OUTPATIENT)
Dept: GASTROENTEROLOGY | Facility: CLINIC | Age: 39
End: 2024-08-13
Attending: INTERNAL MEDICINE
Payer: COMMERCIAL

## 2024-08-13 VITALS — BODY MASS INDEX: 41.68 KG/M2 | WEIGHT: 275 LBS | HEIGHT: 68 IN

## 2024-08-13 DIAGNOSIS — K50.818 CROHN'S DISEASE OF BOTH SMALL AND LARGE INTESTINE WITH OTHER COMPLICATION (H): ICD-10-CM

## 2024-08-13 DIAGNOSIS — K50.80 CROHN'S DISEASE OF BOTH SMALL AND LARGE INTESTINE WITHOUT COMPLICATION (H): Primary | ICD-10-CM

## 2024-08-13 RX ORDER — ZOSTER VACCINE RECOMBINANT, ADJUVANTED 50 MCG/0.5
1 KIT INTRAMUSCULAR ONCE
Qty: 0.5 ML | Refills: 1 | Status: SHIPPED | OUTPATIENT
Start: 2024-08-13 | End: 2024-08-13

## 2024-08-13 ASSESSMENT — PAIN SCALES - GENERAL: PAINLEVEL: MILD PAIN (2)

## 2024-08-13 NOTE — PROGRESS NOTES
Medication Therapy Management (MTM) Encounter    ASSESSMENT:                            Medication Adherence/Access: See below for considerations    Crohn's Disease: Valorie would benefit from continuing Skyrizi every 8 weeks for maintenance of remission. Reviewed vaccination portion of IBD health maintenance today. She is still a candidate for the Shingrix series given IBD. Will try coordinating this through her local pharmacy first, and if unsuccessful, can try a [place] pharmacy. She can get a COVID-19 booster at any time, but encouraged at least one this fall with the seasonal influenza vaccine. Labs are up-to-date, and she has provider follow-up on file.    PLAN:                            Continue Skyrizi 360 mg subcutaneously every 8 weeks.    Would recommend getting the Shingrix series. You can try getting this at a local pharmacy. Would also recommend a COVID-19 booster and seasonal influenza vaccine this fall.  -- [facility] -- [place]    Follow-up:    -- 6 months for MTM, sooner if needed    SUBJECTIVE/OBJECTIVE:                          Valorie Treviño is a 38 year old female seen for a follow-up visit.       Reason for visit: Skyrizi check-in     Allergies/ADRs: Reviewed in chart  Tobacco: She reports that she has never smoked. She has never been exposed to tobacco smoke. She has never used smokeless tobacco.    Medication Adherence/Access: no issues reported    Crohn's Disease:   Skyrizi 360 mg subcutaneously every eight weeks  MVI daily    Denies major concerns today. She is able to get Skyrizi from the pharmacy okay. She was able to get some of the vaccines we had discussed previously.    Colonoscopy 3/2024  Impression:            - Rutgeert's i1. Simple Endoscopic Score for Crohn's                          Disease: 0, mucosal inflammatory changes secondary to                          Crohn's disease, in remission. Biopsied.                          - The examined portion of the diamond-terminal  "ileum was                          normal.     Last provider visit: 2024 with Dr. Ivey  Next provider visit: 10/9/2024 with Damir Ryder PA-C  Last labs completed: 2024  Lab frequency: every 3 months   - standing labs available until 2024  Next labs due: 2024  Last TB screenin2023  PDC: 100% (Skyrizi)    IBD Health Maintenance    Vaccinations:  All patients on immunosuppression should avoid live vaccines unless specifically indicated.    -- Influenza (every year) recommend yearly  -- TdaP (every 10 years) 2017  -- Pneumococcal Pneumonia    Prevnar-13: not on file   Pneumovax-23: 2020   Prevnar-20: 11/10/2023  -- COVID-19 2021, 2021, 9/3/2022 (bivalent), and 11/10/2023 (23-24)    One time confirmation of immunity or serologies:  -- Hepatitis A (serologies or immunizations) not on file   -- Hepatitis B (serologies or immunizations) serologies  indicate immunity  -- Varicella/Zoster    Varicella immune per serologies, did have chickenpox   Zoster not on file    Due to the immunosuppression in this patient, I would not advise administration of live vaccines such as varicella/VZV, intranasal influenza, MMR, or yellow fever vaccine (if traveling).      Immunosuppressive Screening:    Lab Results   Component Value Date    AUSAB 73.28 2023    HEPBANG Nonreactive 2023    HBCAB Nonreactive 2023    HCVAB Nonreactive 2023    TBRES Negative 2023     Depression Screening:    PHQ-2 Score:         2024     5:17 PM 2024     2:37 PM   PHQ-2 (  [employer])   Q1: Little interest or pleasure in doing things 1 0   Q2: Feeling down, depressed or hopeless 1 1   PHQ-2 Score 2 1   Q1: Little interest or pleasure in doing things Several days    Q2: Feeling down, depressed or hopeless Several days    PHQ-2 Score 2      Misc:  -- Avoid tobacco use  -- Avoid NSAIDs as there is potentially a 25% chance of causing an IBD flare      Today's Vitals: Ht 1.727 m (5' 8\")   " Wt 124.7 kg (275 lb)   BMI 41.81 kg/m    ----------------    I spent 13 minutes with this patient today. All changes were made via collaborative practice agreement with Aiyana Ivey. A copy of the visit note was provided to the patient's provider(s).    A summary of these recommendations was sent via [employer].    Crista HerronD, BCACP  MTM Pharmacist   [facility]   Phone: [phone]    Telemedicine Visit Details  Type of service:  Telephone visit  Start Time:  11:50 AM  End Time: 12:03 PM     Medication Therapy Recommendations  No medication therapy recommendations to display

## 2024-08-13 NOTE — Clinical Note
"2024      Lou Treviño  [address]  [place] [address]      Dear Colleague,    Thank you for referring your patient, Lou Treviño, to the [facility]. Please see a copy of my visit note below.    Medication Therapy Management (MTM) Encounter    ASSESSMENT:                            Medication Adherence/Access: {adherencechoices:238880}    ***:  ***      PLAN:                            Skyrizi renewal  Shingrix series  -- [facility]    Follow-up: {followuptest2:720952}    SUBJECTIVE/OBJECTIVE:                          Valorie Treivño is a 38 year old female seen for a follow-up visit.       Reason for visit: Skyrizi check-in     Allergies/ADRs: Reviewed in chart  Tobacco: She reports that she has never smoked. She has never been exposed to tobacco smoke. She has never used smokeless tobacco.  Alcohol: {ALCOHOL CONSUMPTION HX:662701}    Medication Adherence/Access: {fumedadherence:496669}    Crohn's Disease:   Skyrizi 360 mg subcutaneously every eight weeks  MVI daily    Notes that sometimes she     Colonoscopy 3/2024  Impression:            - Rutgeert's i1. Simple Endoscopic Score for Crohn's                          Disease: 0, mucosal inflammatory changes secondary to                          Crohn's disease, in remission. Biopsied.                          - The examined portion of the diamond-terminal ileum was                          normal.     Last provider visit: 2024 with Dr. Ivey  Next provider visit: 10/9/2024 with Damir Ryder PA-C  Last labs completed: 2024  Lab frequency: every 3 months   - standing labs available until 2024  Next labs due: 2024  Last TB screenin2023  PDC: 100% (Skyrizi)        Today's Vitals: Ht 1.727 m (5' 8\")   Wt 124.7 kg (275 lb)   BMI 41.81 kg/m    ----------------  {PATRICK?:310712}    I spent 13 minutes with this patient today. { :711450}. A copy of the visit note was provided to the patient's provider(s).    A " summary of these recommendations {GIVEN/NOT GIVEN:096774}.    Crista Fuentes PharmD, BCACP  MTM Pharmacist   [facility]   Phone: [phone]    Telemedicine Visit Details  Type of service:  Telephone visit  Start Time:  11:50 AM  End Time: 12:03 PM     Medication Therapy Recommendations  No medication therapy recommendations to display         Again, thank you for allowing me to participate in the care of your patient.        Sincerely,        Crista Fuentes RP

## 2024-08-13 NOTE — NURSING NOTE
Current patient location: [address] [place] [address]    Is the patient currently in the state of [place]? YES    Visit mode:TELEPHONE    If the visit is dropped, the patient can be reconnected by: TELEPHONE VISIT: Phone number:   Telephone Information:   Mobile [phone]       Will anyone else be joining the visit? NO  (If patient encounters technical issues they should call [phone])    How would you like to obtain your AVS? MyChart    Are changes needed to the allergy or medication list? Pt stated no changes to allergies and Pt stated no med changes    Are refills needed on medications prescribed by this physician? NO    Rooming Documentation:  Not applicable      Reason for visit: TYSON ELLIS

## 2024-08-15 ENCOUNTER — TELEPHONE (OUTPATIENT)
Dept: GASTROENTEROLOGY | Facility: CLINIC | Age: 39
End: 2024-08-15
Payer: COMMERCIAL

## 2024-08-15 NOTE — TELEPHONE ENCOUNTER
PA Initiation    Medication: SKYRIZI 360 MG/2.4ML SC SOCT  Insurance Company: [facility] Prior Auth Dept, phone  [phone], Fax [phone]  Pharmacy Filling the Rx: [facility] [place]  Filling Pharmacy Phone:    Filling Pharmacy Fax:    Start Date: 8/15/2024  [id number]

## 2024-08-16 NOTE — TELEPHONE ENCOUNTER
Prior Authorization Approval    Medication: SKYRIZI 360 MG/2.4ML SC SOCT  Authorization Effective Date: 8/16/2024  Authorization Expiration Date: 8/16/2025  Approved Dose/Quantity: ud  Reference #: [id number]   Insurance Company: [facility] Prior Auth Dept, phone  [phone], Fax [phone]  Expected CoPay: $    CoPay Card Available:      Financial Assistance Needed:    Which Pharmacy is filling the prescription: [facility]  Pharmacy Notified:    Patient Notified:

## 2024-08-19 RX ORDER — RISANKIZUMAB-RZAA 360 MG/2.4
360 WEARABLE INJECTOR SUBCUTANEOUS
Qty: 2.4 ML | Refills: 2 | Status: SHIPPED | OUTPATIENT
Start: 2024-08-19

## 2024-08-19 NOTE — PATIENT INSTRUCTIONS
"Recommendations from today's MTM visit:                                                       Continue Skyrizi 360 mg subcutaneously every 8 weeks.    Would recommend getting the Shingrix series. You can try getting this at a local pharmacy. Would also recommend a COVID-19 booster and seasonal influenza vaccine this fall.  -- [facility] -- [place]    Follow-up:    -- 6 months for MTM, sooner if needed    It was great speaking with you today.  I value your experience and would be very thankful for your time in providing feedback in our clinic survey. In the next few days, you may receive an email or text message from [employer] with a link to a survey related to your  clinical pharmacist.\"     To schedule another MTM appointment, please call the clinic directly or you may call the MTM scheduling line at [phone].    My Clinical Pharmacist's contact information:                                                      Please feel free to contact me with any questions or concerns you have.      Crista HerronD, BCACP  MTM Pharmacist   [facility]   Phone: [phone]    "

## 2024-08-21 ENCOUNTER — MYC MEDICAL ADVICE (OUTPATIENT)
Dept: FAMILY MEDICINE | Facility: CLINIC | Age: 39
End: 2024-08-21
Payer: COMMERCIAL

## 2024-09-06 ENCOUNTER — LAB (OUTPATIENT)
Dept: LAB | Facility: CLINIC | Age: 39
End: 2024-09-06
Payer: COMMERCIAL

## 2024-09-06 DIAGNOSIS — R73.03 PREDIABETES: ICD-10-CM

## 2024-09-06 DIAGNOSIS — K50.811 CROHN'S DISEASE OF BOTH SMALL AND LARGE INTESTINE WITH RECTAL BLEEDING (H): ICD-10-CM

## 2024-09-06 LAB
BASOPHILS # BLD AUTO: 0.1 10E3/UL (ref 0–0.2)
BASOPHILS NFR BLD AUTO: 1 %
EOSINOPHIL # BLD AUTO: 0.3 10E3/UL (ref 0–0.7)
EOSINOPHIL NFR BLD AUTO: 3 %
ERYTHROCYTE [DISTWIDTH] IN BLOOD BY AUTOMATED COUNT: 12.3 % (ref 10–15)
HBA1C MFR BLD: 6.1 % (ref 0–5.6)
HCT VFR BLD AUTO: 39.9 % (ref 35–47)
HGB BLD-MCNC: 12.9 G/DL (ref 11.7–15.7)
IMM GRANULOCYTES # BLD: 0 10E3/UL
IMM GRANULOCYTES NFR BLD: 0 %
LYMPHOCYTES # BLD AUTO: 2.7 10E3/UL (ref 0.8–5.3)
LYMPHOCYTES NFR BLD AUTO: 27 %
MCH RBC QN AUTO: 27.7 PG (ref 26.5–33)
MCHC RBC AUTO-ENTMCNC: 32.3 G/DL (ref 31.5–36.5)
MCV RBC AUTO: 86 FL (ref 78–100)
MONOCYTES # BLD AUTO: 0.5 10E3/UL (ref 0–1.3)
MONOCYTES NFR BLD AUTO: 5 %
NEUTROPHILS # BLD AUTO: 6.3 10E3/UL (ref 1.6–8.3)
NEUTROPHILS NFR BLD AUTO: 64 %
PLATELET # BLD AUTO: 286 10E3/UL (ref 150–450)
RBC # BLD AUTO: 4.66 10E6/UL (ref 3.8–5.2)
WBC # BLD AUTO: 9.9 10E3/UL (ref 4–11)

## 2024-09-06 PROCEDURE — 36415 COLL VENOUS BLD VENIPUNCTURE: CPT

## 2024-09-06 PROCEDURE — 86140 C-REACTIVE PROTEIN: CPT

## 2024-09-06 PROCEDURE — 80053 COMPREHEN METABOLIC PANEL: CPT

## 2024-09-06 PROCEDURE — 83036 HEMOGLOBIN GLYCOSYLATED A1C: CPT

## 2024-09-06 PROCEDURE — 85025 COMPLETE CBC W/AUTO DIFF WBC: CPT

## 2024-09-07 LAB
ALBUMIN SERPL BCG-MCNC: 3.8 G/DL (ref 3.5–5.2)
ALP SERPL-CCNC: 66 U/L (ref 40–150)
ALT SERPL W P-5'-P-CCNC: 14 U/L (ref 0–50)
ANION GAP SERPL CALCULATED.3IONS-SCNC: 10 MMOL/L (ref 7–15)
AST SERPL W P-5'-P-CCNC: 16 U/L (ref 0–45)
BILIRUB SERPL-MCNC: 0.3 MG/DL
BUN SERPL-MCNC: 7.2 MG/DL (ref 6–20)
CALCIUM SERPL-MCNC: 8.9 MG/DL (ref 8.8–10.4)
CHLORIDE SERPL-SCNC: 102 MMOL/L (ref 98–107)
CREAT SERPL-MCNC: 0.66 MG/DL (ref 0.51–0.95)
CRP SERPL-MCNC: 20.8 MG/L
EGFRCR SERPLBLD CKD-EPI 2021: >90 ML/MIN/1.73M2
GLUCOSE SERPL-MCNC: 99 MG/DL (ref 70–99)
HCO3 SERPL-SCNC: 25 MMOL/L (ref 22–29)
POTASSIUM SERPL-SCNC: 4 MMOL/L (ref 3.4–5.3)
PROT SERPL-MCNC: 7.2 G/DL (ref 6.4–8.3)
SODIUM SERPL-SCNC: 137 MMOL/L (ref 135–145)

## 2024-09-23 ENCOUNTER — MYC MEDICAL ADVICE (OUTPATIENT)
Dept: FAMILY MEDICINE | Facility: CLINIC | Age: 39
End: 2024-09-23
Payer: COMMERCIAL

## 2024-09-23 DIAGNOSIS — R45.89 DEPRESSED MOOD: Primary | ICD-10-CM

## 2024-09-24 RX ORDER — BUPROPION HYDROCHLORIDE 300 MG/1
300 TABLET ORAL EVERY MORNING
Qty: 90 TABLET | Refills: 0 | Status: SHIPPED | OUTPATIENT
Start: 2024-09-24

## 2024-10-02 ENCOUNTER — MYC MEDICAL ADVICE (OUTPATIENT)
Dept: FAMILY MEDICINE | Facility: CLINIC | Age: 39
End: 2024-10-02
Payer: COMMERCIAL

## 2024-10-09 ENCOUNTER — VIRTUAL VISIT (OUTPATIENT)
Dept: GASTROENTEROLOGY | Facility: CLINIC | Age: 39
End: 2024-10-09
Attending: INTERNAL MEDICINE
Payer: COMMERCIAL

## 2024-10-09 VITALS — BODY MASS INDEX: 41.68 KG/M2 | HEIGHT: 68 IN | WEIGHT: 275 LBS

## 2024-10-09 DIAGNOSIS — K50.80 CROHN'S DISEASE OF BOTH SMALL AND LARGE INTESTINE WITHOUT COMPLICATION (H): Primary | ICD-10-CM

## 2024-10-09 PROCEDURE — 99214 OFFICE O/P EST MOD 30 MIN: CPT | Mod: 95 | Performed by: PHYSICIAN ASSISTANT

## 2024-10-09 ASSESSMENT — PAIN SCALES - GENERAL: PAINLEVEL: NO PAIN (0)

## 2024-10-09 NOTE — LETTER
10/9/2024      Lou Treviño  [address]  [place] [address]      Dear Colleague,    Thank you for referring your patient, Lou Treviño, to the [facility] [place]. Please see a copy of my visit note below.    Virtual Visit Details    Type of service:  Video Visit     Originating Location (pt. Location): Home    Distant Location (provider location):  Off-site  Platform used for Video Visit: [facility]    GASTROENTEROLOGY IBD  OUTPATIENT CLINIC FOLLOW-UP    DATE OF SERVICE: 4/2/2024  PROVIDER REQUESTING CONSULT: Referred Self  Reason for Follow-Up: Crohn's disease     ASSESSMENT:  39-year-old female with prediabetes, elevated BMI, status post right hemicolectomy (for intussusception as an infant) with new diagnosis of Crohn's disease (8/2023) initiated on Skyrizi, now in deep remission.    # Crohn's disease, now in deep remission  # Colonoscopy Rutgeert's i1 ; Simple Endoscopic Score for Crohn's 0  Initially diagnosed on colonoscopy 8/2023 for persistent diarrhea. Treated with Skyrizi (9/2023) and had slow steroid taper, for which she is now off. She continues to do well on Q8 week Skyrizi in terms of symptoms, as well as recent colonoscopy with evidence of deep remission endoscopically and histologically.     RECOMMENDATIONS:  -- continue Skyrizi Q8 weeks   -- Labs every 3 months.   -- continue to work with [facility] pharmacy re: healthcare maintenance  -- plan for next colonoscopy in 8 years (2032)     RTC 6 months     Damir Ryder PA-C  Division of Gastroenterology, Hepatology and Nutrition  [facility]       ________________________________________________________________  HPI:  Initial history:   38 yr female with prediabetes, elevated BMI, status post right hemicolectomy (for intussusception as an infant) new diagnosis of Crohn's disease who presented to the clinic to establish care.     Pt reports that she had surgery for intususception when she was 3  months old, this involved a right hemicolectomy with a ileotransverse anastomosis.  Since then patient has soft stool about 1-2 times daily.    Last year she had a 2-month long episode of diarrhea (increased frequency of stool) associated with abdominal cramps.  She was seen by her primary care provider who prescribed Pepcid.  Her symptoms seem to have abated after 2 months.  That episode was attributed to likely a sequela of her prior colon surgery.    More recently late July to early August she started having diarrhea which she describes as multiple bowel movements throughout the day this was associated with urgency and tenesmus.  In addition she reports seeing blood in the stool intermittently.  This led to further work-up which involved testing for enteric panel and C. difficile which were negative.  Subsequently she was referred to GI, was seen by Dr. Zoraida Jamil who did a colonoscopy on her that showed evidence of Crohn's disease.  Patient was then started on prednisone 40 mg with a plan to taper 10 mg every week.    She is currently on prednisone 20 mg.  She reports that while she was on prednisone 30 mg she had good control of symptoms but within two days of reducing dose to 20 mg she started to experience symptoms again.  With regards to side effects while she was on prednisone, she reports increased appetite.     She denies any family history of inflammatory bowel disease or colon cancer.  No family history of any other autoimmune diseases.  She does report that her mother has a pulmonary embolism which was idiopathic and is on lifelong anticoagulation.  At one point of time patient was also tested for hypercoagulability disorders and reportedly the work-up was negative..    Prior to the diagnosis of Crohn's she was using ibuprofen about 1-2 times per week for relief of headaches.  She also reports joint pains predominantly in the hands and shoulder region along with back pain.  She denies any redness or  swelling of the joints and reported that the joint pains did not respond to prednisone.    Patient denies any history of smoking or alcohol.  She works as a  and is mostly involved with IT work.  She has no plans for having kids.    Interval history 10/2023  Had first Skyrizi infusion on 9/18, second on 10/16.   Tried to taper off prednisone after first infusion but had difficulty. Retrying after second infusion. Currently going down to 15 mg starting today.     Having some mild urgency.   Having a BM once every few hours. No nocturnal BMs, which was a problem in the past.   No blood per rectum.     Interval history 4/2024  Things are going well overall. She's reassured by her recent colonoscopy.   She thinks her BMs are more formed, typically 3 per day (improved from prior). Non-bloody. Occasionally has noctural awakening that she thinks is diet related; occurs ~ 1x/week. Intermittent mild urgency, which is improving. No tenesmus.   Has a URI currently that unfortunately started yesterday.  No unintentional weight loss; working on diet. No N/V, abdominal pain.   She notices dietary triggers like fried foods that she has been trying to cut out.   Off of steroids now, has been off since around November.     Interval hx 10/9/24  Currently having 2-3 stools per day that are formed, no blood. No nocturnal stools. Joint pain is usually consistent discomfort primarily in hands (on computer a lot). No new EIM.      PMHx:  Past Medical History:   Diagnosis Date     Migraine      Noninfectious ileitis      Uncomplicated asthma      Patient Active Problem List   Diagnosis     Mild persistent asthma     Migraines     IBS (irritable bowel syndrome)     Anxiety and depression     Allergies     History of intussusception     Family history of factor V deficiency     Generalized anxiety disorder     Healthcare maintenance     Prediabetes     Morbid obesity (H)     Nausea     Hypoalbuminemia     Hepatic steatosis      Left knee pain     Elevated fecal calprotectin     Crohn's disease of both small and large intestine with rectal bleeding (H)       PSurgHx:  Past Surgical History:   Procedure Laterality Date     COLONOSCOPY N/A 8/3/2023    Procedure: COLONOSCOPY WITH BIOPSIES;  Surgeon: Zoraida Jamil MD;  Location: [facility] OR     COLONOSCOPY N/A 3/25/2024    Procedure: Colonoscopy with biopsy;  Surgeon: Aiyana Ivey MD;  Location: [facility] OR     INTUSSUSCEPTION REPAIR  1986    Hemicolectomy, removal of 11 cm of proximal and distal colon       MEDS:  Current Outpatient Medications   Medication Sig Dispense Refill     buPROPion (WELLBUTRIN XL) 300 MG 24 hr tablet Take 1 tablet (300 mg) by mouth every morning. 90 tablet 0     cetirizine (ZYRTEC) 10 MG tablet Take 1 tablet (10 mg) by mouth daily 90 tablet 1     citalopram (CELEXA) 20 MG tablet Take 1 tablet (20 mg) by mouth daily 90 tablet 3     famotidine (PEPCID) 20 MG tablet Take 1 tablet (20 mg) by mouth 2 times daily 180 tablet 0     fluticasone (FLONASE) 50 MCG/ACT nasal spray Spray 1 spray into both nostrils daily 18.2 mL 0     ketoconazole (NIZORAL) 2 % external cream Apply topically daily To affected area on the left chest until clear, typically 2-3 weeks. 60 g 3     levalbuterol (XOPENEX HFA) 45 MCG/ACT inhaler Inhale 1-2 puffs into the lungs every 6 hours as needed for wheezing 15 g 1     multivitamin w/minerals (MULTI-VITAMIN) tablet Take 1 tablet by mouth daily       norethindrone-ethinyl estradiol (ORTHO-NOVUM) 1-35 MG-MCG tablet Take 1 tablet by mouth daily 84 tablet 3     propranolol ER (INDERAL LA) 60 MG 24 hr capsule Take 1 capsule (60 mg) by mouth daily 90 capsule 1     rimegepant (NURTEC) 75 MG ODT tablet Place 1 tablet (75 mg) under the tongue daily as needed for migraine Maximum of 1 tablet every 24 hours. 8 tablet 0     Risankizumab-rzaa (SKYRIZI) 360 MG/2.4ML SOCT Inject 2.4 mLs (360 mg) subcutaneously once every eight weeks 2.4 mL 2     No current  facility-administered medications for this visit.     ALLERGIES:    Allergies   Allergen Reactions     Codeine      FHx:  Family History   Problem Relation Age of Onset     Pulmonary Embolism Mother         In 2013 at age 63 years, Factor V deficiency, indefinite anticoagulation     Asthma Mother      Migraines Mother      Depression Mother      Cancer Maternal Grandmother      Cancer Paternal Grandmother      Cerebrovascular Disease Paternal Grandfather      Depression Sister      Glaucoma Maternal Great-Grandmother      Melanoma No family hx of      Skin Cancer No family hx of        SOCIAL Hx:  Social History     Socioeconomic History     Marital status: Single     Spouse name: Not on file     Number of children: Not on file     Years of education: Not on file     Highest education level: Not on file   Occupational History     Not on file   Tobacco Use     Smoking status: Never     Passive exposure: Never     Smokeless tobacco: Never   Vaping Use     Vaping status: Never Used   Substance and Sexual Activity     Alcohol use: Not Currently     Comment: Rare     Drug use: Never     Sexual activity: Yes     Partners: Male     Birth control/protection: Pill   Other Topics Concern     Not on file   Social History Narrative    6/21/2021 Employed as an  at [employer]. Still working from home but anticipating a return to the office. Male partner. Has a cat.      Social Determinants of Health     Financial Resource Strain: Low Risk  (8/5/2024)    Financial Resource Strain      Within the past 12 months, have you or your family members you live with been unable to get utilities (heat, electricity) when it was really needed?: No   Food Insecurity: Low Risk  (8/5/2024)    Food Insecurity      Within the past 12 months, did you worry that your food would run out before you got money to buy more?: No      Within the past 12 months, did the food you bought just not last and you didn t have money to get more?:  "No   Transportation Needs: Low Risk  (8/5/2024)    Transportation Needs      Within the past 12 months, has lack of transportation kept you from medical appointments, getting your medicines, non-medical meetings or appointments, work, or from getting things that you need?: No   Physical Activity: Not on file   Stress: Not on file   Social Connections: Not on file   Interpersonal Safety: Not on file   Housing Stability: Low Risk  (8/5/2024)    Housing Stability      Do you have housing? : Yes      Are you worried about losing your housing?: No       ROS: A comprehensive Review of Systems was asked and answered in the negative unless specifically commented upon in the HPI    Physical Exam  Ht 1.727 m (5' 8\")   Wt 124.7 kg (275 lb)   BMI 41.81 kg/m    Body mass index is 41.81 kg/m .  Limited exam VVC visit   Gen:Pt in no acute distress  HEENT: no scleral icterus.  Lungs: Non labored breathing  Skin: no jaundice  Neuro: Alert and oriented x 3     LABS:  CBC RESULTS:   Recent Labs   Lab Test 03/25/24  1103   WBC 8.1   RBC 4.76   HGB 13.1   HCT 39.5   MCV 83   MCH 27.5   MCHC 33.2   RDW 12.4       Last Comprehensive Metabolic Panel:  Lab Results   Component Value Date     09/06/2024    POTASSIUM 4.0 09/06/2024    CHLORIDE 102 09/06/2024    CO2 25 09/06/2024    ANIONGAP 10 09/06/2024    GLC 99 09/06/2024    BUN 7.2 09/06/2024    CR 0.66 09/06/2024    GFRESTIMATED >90 09/06/2024    JENNIFER 8.9 09/06/2024        CRP Inflammation   Date Value Ref Range Status   09/06/2024 20.80 (H) <5.00 mg/L Final      IMAGING/PROCEDURES  Colonoscopy 3/25/2024  Findings:       The perianal and digital rectal examinations were normal.        The diamond-terminal ileum appeared normal.        Single small ulcer at the ileocolonic anastomosis, consistent with        Rutgeert's i1. The Simple Endoscopic Score for Crohn's Disease was        determined based on the endoscopic appearance of the mucosa in the        following segments:        " - Ileum: Findings include no ulcers present, no ulcerated surfaces, no        affected surfaces and no narrowings. Segment score: 0.        - Right Colon: Findings include surgically absent. Segment score: 0.        - Transverse Colon: Findings include no ulcers present, no ulcerated        surfaces, no affected surfaces and no narrowings. Segment score: 0.        - Left Colon: Findings include no ulcers present, no ulcerated surfaces,        no affected surfaces and no narrowings. Segment score: 0.        - Rectum: Findings include no ulcers present, no ulcerated surfaces, no        affected surfaces and no narrowings. Segment score: 0.        - Total SES-CD aggregate score: 0. Biopsies were taken with a cold        forceps for histology.                                                                                    Impression:            - Rutgeert's i1. Simple Endoscopic Score for Crohn's                          Disease: 0, mucosal inflammatory changes secondary to                          Crohn's disease, in remission. Biopsied.                          - The examined portion of the diamond-terminal ileum was                          normal.   Recommendation:        - Discharge patient to home (ambulatory).                          - Resume previous diet.                          - Continue present medications.                          - Await pathology results.                          - The findings and recommendations were discussed with                          the patient.     Final Diagnosis   A. TRANSVERSE COLON, BIOPSIES:  - Colonic mucosa with no significant histologic abnormality  - Negative for acute inflammation, signs of chronicity and dysplasia     B. DESCENDING COLON, BIOPSIES:  - Colonic mucosa with crypt architectural distortion and left-sided Paneth cell metaplasia; compatible with site of prior injury  - Negative for acute inflammation and dysplasia     C. SIGMOID COLON,BIOPSIES:  - Colonic  mucosa with crypt architectural distortion and left-sided Paneth cell metaplasia; compatible with site of prior injury  - Negative for acute inflammation and dysplasia     D.  RECTUM, BIOPSIES:  - Colorectal mucosa with minimal crypt architectural distortion and focal Paneth cell metaplasia; compatible with site of prior injury  - Negative for acute inflammation and dysplasia                                                   Again, thank you for allowing me to participate in the care of your patient.        Sincerely,        Damir Ryder PA-C

## 2024-10-09 NOTE — NURSING NOTE
Current patient location: [address] [place] [address]    Is the patient currently in the state of [place]? YES    Visit mode:VIDEO    If the visit is dropped, the patient can be reconnected by: VIDEO VISIT: Text to cell phone:   Telephone Information:   Mobile [phone]       Will anyone else be joining the visit? NO  (If patient encounters technical issues they should call [phone]    Are changes needed to the allergy or medication list? No    Are refills needed on medications prescribed by this physician? NO    Rooming Documentation:  Questionnaire(s) completed    Reason for visit: Video Visit (Recheck IBD)    Padmini ELLIS

## 2024-10-09 NOTE — PROGRESS NOTES
Virtual Visit Details    Type of service:  Video Visit     Originating Location (pt. Location): Home    Distant Location (provider location):  Off-site  Platform used for Video Visit: [facility]    GASTROENTEROLOGY IBD  OUTPATIENT CLINIC FOLLOW-UP    DATE OF SERVICE: 4/2/2024  PROVIDER REQUESTING CONSULT: Referred Self  Reason for Follow-Up: Crohn's disease     ASSESSMENT:  39-year-old female with prediabetes, elevated BMI, status post right hemicolectomy (for intussusception as an infant) with new diagnosis of Crohn's disease (8/2023) initiated on Skyrizi, now in deep remission.    # Crohn's disease, now in deep remission  # Colonoscopy Rutgeert's i1 ; Simple Endoscopic Score for Crohn's 0  Initially diagnosed on colonoscopy 8/2023 for persistent diarrhea. Treated with Skyrizi (9/2023) and had slow steroid taper, for which she is now off. She continues to do well on Q8 week Skyrizi in terms of symptoms, as well as recent colonoscopy with evidence of deep remission endoscopically and histologically.     RECOMMENDATIONS:  -- continue Skyrizi Q8 weeks   -- Labs every 3 months.   -- continue to work with [facility] pharmacy re: healthcare maintenance  -- plan for next colonoscopy in 8 years (2032)     RTC 6 months     Damir Ryder PA-C  Division of Gastroenterology, Hepatology and Nutrition  [facility]       ________________________________________________________________  HPI:  Initial history:   38 yr female with prediabetes, elevated BMI, status post right hemicolectomy (for intussusception as an infant) new diagnosis of Crohn's disease who presented to the clinic to establish care.     Pt reports that she had surgery for intususception when she was 3 months old, this involved a right hemicolectomy with a ileotransverse anastomosis.  Since then patient has soft stool about 1-2 times daily.    Last year she had a 2-month long episode of diarrhea (increased frequency of stool) associated with abdominal cramps.   She was seen by her primary care provider who prescribed Pepcid.  Her symptoms seem to have abated after 2 months.  That episode was attributed to likely a sequela of her prior colon surgery.    More recently late July to early August she started having diarrhea which she describes as multiple bowel movements throughout the day this was associated with urgency and tenesmus.  In addition she reports seeing blood in the stool intermittently.  This led to further work-up which involved testing for enteric panel and C. difficile which were negative.  Subsequently she was referred to GI, was seen by Dr. Zoraida Jamil who did a colonoscopy on her that showed evidence of Crohn's disease.  Patient was then started on prednisone 40 mg with a plan to taper 10 mg every week.    She is currently on prednisone 20 mg.  She reports that while she was on prednisone 30 mg she had good control of symptoms but within two days of reducing dose to 20 mg she started to experience symptoms again.  With regards to side effects while she was on prednisone, she reports increased appetite.     She denies any family history of inflammatory bowel disease or colon cancer.  No family history of any other autoimmune diseases.  She does report that her mother has a pulmonary embolism which was idiopathic and is on lifelong anticoagulation.  At one point of time patient was also tested for hypercoagulability disorders and reportedly the work-up was negative..    Prior to the diagnosis of Crohn's she was using ibuprofen about 1-2 times per week for relief of headaches.  She also reports joint pains predominantly in the hands and shoulder region along with back pain.  She denies any redness or swelling of the joints and reported that the joint pains did not respond to prednisone.    Patient denies any history of smoking or alcohol.  She works as a  and is mostly involved with IT work.  She has no plans for having kids.    Interval  history 10/2023  Had first Skyrizi infusion on 9/18, second on 10/16.   Tried to taper off prednisone after first infusion but had difficulty. Retrying after second infusion. Currently going down to 15 mg starting today.     Having some mild urgency.   Having a BM once every few hours. No nocturnal BMs, which was a problem in the past.   No blood per rectum.     Interval history 4/2024  Things are going well overall. She's reassured by her recent colonoscopy.   She thinks her BMs are more formed, typically 3 per day (improved from prior). Non-bloody. Occasionally has noctural awakening that she thinks is diet related; occurs ~ 1x/week. Intermittent mild urgency, which is improving. No tenesmus.   Has a URI currently that unfortunately started yesterday.  No unintentional weight loss; working on diet. No N/V, abdominal pain.   She notices dietary triggers like fried foods that she has been trying to cut out.   Off of steroids now, has been off since around November.     Interval hx 10/9/24  Currently having 2-3 stools per day that are formed, no blood. No nocturnal stools. Joint pain is usually consistent discomfort primarily in hands (on computer a lot). No new EIM.      PMHx:  Past Medical History:   Diagnosis Date    Migraine     Noninfectious ileitis     Uncomplicated asthma      Patient Active Problem List   Diagnosis    Mild persistent asthma    Migraines    IBS (irritable bowel syndrome)    Anxiety and depression    Allergies    History of intussusception    Family history of factor V deficiency    Generalized anxiety disorder    Healthcare maintenance    Prediabetes    Morbid obesity (H)    Nausea    Hypoalbuminemia    Hepatic steatosis    Left knee pain    Elevated fecal calprotectin    Crohn's disease of both small and large intestine with rectal bleeding (H)       PSurgHx:  Past Surgical History:   Procedure Laterality Date    COLONOSCOPY N/A 8/3/2023    Procedure: COLONOSCOPY WITH BIOPSIES;  Surgeon:  Zoraida Jamil MD;  Location: [facility] OR    COLONOSCOPY N/A 3/25/2024    Procedure: Colonoscopy with biopsy;  Surgeon: Aiyana Ivey MD;  Location: [facility] OR    INTUSSUSCEPTION REPAIR  1986    Hemicolectomy, removal of 11 cm of proximal and distal colon       MEDS:  Current Outpatient Medications   Medication Sig Dispense Refill    buPROPion (WELLBUTRIN XL) 300 MG 24 hr tablet Take 1 tablet (300 mg) by mouth every morning. 90 tablet 0    cetirizine (ZYRTEC) 10 MG tablet Take 1 tablet (10 mg) by mouth daily 90 tablet 1    citalopram (CELEXA) 20 MG tablet Take 1 tablet (20 mg) by mouth daily 90 tablet 3    famotidine (PEPCID) 20 MG tablet Take 1 tablet (20 mg) by mouth 2 times daily 180 tablet 0    fluticasone (FLONASE) 50 MCG/ACT nasal spray Spray 1 spray into both nostrils daily 18.2 mL 0    ketoconazole (NIZORAL) 2 % external cream Apply topically daily To affected area on the left chest until clear, typically 2-3 weeks. 60 g 3    levalbuterol (XOPENEX HFA) 45 MCG/ACT inhaler Inhale 1-2 puffs into the lungs every 6 hours as needed for wheezing 15 g 1    multivitamin w/minerals (MULTI-VITAMIN) tablet Take 1 tablet by mouth daily      norethindrone-ethinyl estradiol (ORTHO-NOVUM) 1-35 MG-MCG tablet Take 1 tablet by mouth daily 84 tablet 3    propranolol ER (INDERAL LA) 60 MG 24 hr capsule Take 1 capsule (60 mg) by mouth daily 90 capsule 1    rimegepant (NURTEC) 75 MG ODT tablet Place 1 tablet (75 mg) under the tongue daily as needed for migraine Maximum of 1 tablet every 24 hours. 8 tablet 0    Risankizumab-rzaa (SKYRIZI) 360 MG/2.4ML SOCT Inject 2.4 mLs (360 mg) subcutaneously once every eight weeks 2.4 mL 2     No current facility-administered medications for this visit.     ALLERGIES:    Allergies   Allergen Reactions    Codeine      FHx:  Family History   Problem Relation Age of Onset    Pulmonary Embolism Mother         In 2013 at age 63 years, Factor V deficiency, indefinite anticoagulation    Asthma Mother      Migraines Mother     Depression Mother     Cancer Maternal Grandmother     Cancer Paternal Grandmother     Cerebrovascular Disease Paternal Grandfather     Depression Sister     Glaucoma Maternal Great-Grandmother     Melanoma No family hx of     Skin Cancer No family hx of        SOCIAL Hx:  Social History     Socioeconomic History    Marital status: Single     Spouse name: Not on file    Number of children: Not on file    Years of education: Not on file    Highest education level: Not on file   Occupational History    Not on file   Tobacco Use    Smoking status: Never     Passive exposure: Never    Smokeless tobacco: Never   Vaping Use    Vaping status: Never Used   Substance and Sexual Activity    Alcohol use: Not Currently     Comment: Rare    Drug use: Never    Sexual activity: Yes     Partners: Male     Birth control/protection: Pill   Other Topics Concern    Not on file   Social History Narrative    6/21/2021 Employed as an  at [employer]. Still working from home but anticipating a return to the office. Male partner. Has a cat.      Social Determinants of Health     Financial Resource Strain: Low Risk  (8/5/2024)    Financial Resource Strain     Within the past 12 months, have you or your family members you live with been unable to get utilities (heat, electricity) when it was really needed?: No   Food Insecurity: Low Risk  (8/5/2024)    Food Insecurity     Within the past 12 months, did you worry that your food would run out before you got money to buy more?: No     Within the past 12 months, did the food you bought just not last and you didn t have money to get more?: No   Transportation Needs: Low Risk  (8/5/2024)    Transportation Needs     Within the past 12 months, has lack of transportation kept you from medical appointments, getting your medicines, non-medical meetings or appointments, work, or from getting things that you need?: No   Physical Activity: Not on file   Stress: Not on  "file   Social Connections: Not on file   Interpersonal Safety: Not on file   Housing Stability: Low Risk  (8/5/2024)    Housing Stability     Do you have housing? : Yes     Are you worried about losing your housing?: No       ROS: A comprehensive Review of Systems was asked and answered in the negative unless specifically commented upon in the HPI    Physical Exam  Ht 1.727 m (5' 8\")   Wt 124.7 kg (275 lb)   BMI 41.81 kg/m    Body mass index is 41.81 kg/m .  Limited exam VVC visit   Gen:Pt in no acute distress  HEENT: no scleral icterus.  Lungs: Non labored breathing  Skin: no jaundice  Neuro: Alert and oriented x 3     LABS:  CBC RESULTS:   Recent Labs   Lab Test 03/25/24  1103   WBC 8.1   RBC 4.76   HGB 13.1   HCT 39.5   MCV 83   MCH 27.5   MCHC 33.2   RDW 12.4       Last Comprehensive Metabolic Panel:  Lab Results   Component Value Date     09/06/2024    POTASSIUM 4.0 09/06/2024    CHLORIDE 102 09/06/2024    CO2 25 09/06/2024    ANIONGAP 10 09/06/2024    GLC 99 09/06/2024    BUN 7.2 09/06/2024    CR 0.66 09/06/2024    GFRESTIMATED >90 09/06/2024    JENNIFER 8.9 09/06/2024        CRP Inflammation   Date Value Ref Range Status   09/06/2024 20.80 (H) <5.00 mg/L Final      IMAGING/PROCEDURES  Colonoscopy 3/25/2024  Findings:       The perianal and digital rectal examinations were normal.        The diamond-terminal ileum appeared normal.        Single small ulcer at the ileocolonic anastomosis, consistent with        Rutgeert's i1. The Simple Endoscopic Score for Crohn's Disease was        determined based on the endoscopic appearance of the mucosa in the        following segments:        - Ileum: Findings include no ulcers present, no ulcerated surfaces, no        affected surfaces and no narrowings. Segment score: 0.        - Right Colon: Findings include surgically absent. Segment score: 0.        - Transverse Colon: Findings include no ulcers present, no ulcerated        surfaces, no affected surfaces and " no narrowings. Segment score: 0.        - Left Colon: Findings include no ulcers present, no ulcerated surfaces,        no affected surfaces and no narrowings. Segment score: 0.        - Rectum: Findings include no ulcers present, no ulcerated surfaces, no        affected surfaces and no narrowings. Segment score: 0.        - Total SES-CD aggregate score: 0. Biopsies were taken with a cold        forceps for histology.                                                                                    Impression:            - Rutgeert's i1. Simple Endoscopic Score for Crohn's                          Disease: 0, mucosal inflammatory changes secondary to                          Crohn's disease, in remission. Biopsied.                          - The examined portion of the diamond-terminal ileum was                          normal.   Recommendation:        - Discharge patient to home (ambulatory).                          - Resume previous diet.                          - Continue present medications.                          - Await pathology results.                          - The findings and recommendations were discussed with                          the patient.     Final Diagnosis   A. TRANSVERSE COLON, BIOPSIES:  - Colonic mucosa with no significant histologic abnormality  - Negative for acute inflammation, signs of chronicity and dysplasia     B. DESCENDING COLON, BIOPSIES:  - Colonic mucosa with crypt architectural distortion and left-sided Paneth cell metaplasia; compatible with site of prior injury  - Negative for acute inflammation and dysplasia     C. SIGMOID COLON,BIOPSIES:  - Colonic mucosa with crypt architectural distortion and left-sided Paneth cell metaplasia; compatible with site of prior injury  - Negative for acute inflammation and dysplasia     D.  RECTUM, BIOPSIES:  - Colorectal mucosa with minimal crypt architectural distortion and focal Paneth cell metaplasia; compatible with site of prior  injury  - Negative for acute inflammation and dysplasia

## 2024-10-09 NOTE — PATIENT INSTRUCTIONS
It was a pleasure meeting with you today and discussing your healthcare plan. Below is a summary of what we covered:    -- Continue skyrizi every 8 weeks  -- Labs every 3 months   -- Patient with IBD we recommend supplementation vitamin D 1000 units daily and calcium 500 mg twice daily.  -- No NSAIDs (ibuprofen, or anything containing ibuprofen)    For additional resources about inflammatory bowel disease visit http://www.crohnscolitisfoundation.org/    To learn more about Diet and Nutrition in the setting of IBD, check out some of these resources:  https://www.crohnscolitisfoundation.org/diet-and-nutrition/what-should-i-eat    https://ntforibd.org/ (SCD, mSCD, Autoimmune protocol, IBD-AID, CDED diets with inclusion/exclusion charts)    Damir Ryder PA-C  Division of Gastroenterology, Hepatology and Nutrition  [facility]          Please see below for any additional questions and scheduling guidelines.    Sign up for [facility]: [employer] patient portal serves as a secure platform for accessing your medical records from the [facility]. Additionally, [employer] facilitates easy, timely, and secure messaging with your care team. If you have not signed up, you may do so by using the provided code or calling [phone].    Coordinating your care after your visit:  There are multiple options for scheduling your follow-up care based on your provider's recommendation.    How do I schedule a follow-up clinic appointment:   After your appointment, you may receive scheduling assistance with the Clinic Coordinators by having a seat in the waiting room and a Clinic Coordinator will call you up to schedule.  Virtual visits or after you leave the clinic:  Your provider has placed a follow-up order in the [employer] portal for scheduling your return appointment. A member of the scheduling team will contact you to schedule.  Conferensumt Scheduling: Timely scheduling through [employer] is advised to ensure appointment  availability.   Call to schedule: You may schedule your follow-up appointment(s) by calling [phone], option 1.    How do I schedule my endoscopy or colonoscopy procedure:  If a procedure, such as a colonoscopy or upper endoscopy was ordered by your provider, the scheduling team will contact you to schedule this procedure. Or you may choose to call to schedule at   [phone], option 2.  Please allow 20-30 minutes when scheduling a procedure.    How do I get my blood work done? To get your blood work done, you need to schedule a lab appointment at an [facility]. There are multiple ways to schedule:   At the clinic: The Clinic Coordinator you meet after your visit can help you schedule a lab appointment.   [facility] scheduling: [facility] offers online lab scheduling at all [facility] laboratory locations.   Call to schedule: You can call [phone] to schedule your lab appointment.    How do I schedule my imaging study: To schedule imaging studies, such as CT scans, ultrasounds, MRIs, or X-rays, contact Imaging Services at [phone].    How do I schedule a referral to another doctor: If your provider recommended a referral to another specialist(s), the referral order was placed by your provider. You will receive a phone call to schedule this referral, or you may choose to call the number attached to the referral to self-schedule.    For Post-Visit Question(s):  For any inquiries following today's visit:  Please utilize [employer] messaging and allow 48 hours for reply or contact the Call Center during normal business hours at [phone], option 3.  For Emergent After-hours questions, contact the On-Call GI Fellow through the [facility]  at [phone].  In addition, you may contact your Nurse directly using the provided contact information.    Test Results:  Test results will be accessible via [employer] in compliance with the 21st Century Cures Act. This means that  your results will be available to you at the same time as your provider. Often you may see your results before your provider does. Results are reviewed by staff within two weeks with communication follow-up. Results may be released in the patient portal prior to your care team review.    Prescription Refill(s):  Medication prescribed by your provider will be addressed during your visit. For future refills, please coordinate with your pharmacy. If you have not had a recent clinic visit or routine labs, for your safety, your provider may not be able to refill your prescription.

## 2024-11-01 ENCOUNTER — TELEPHONE (OUTPATIENT)
Dept: GASTROENTEROLOGY | Facility: CLINIC | Age: 39
End: 2024-11-01
Payer: COMMERCIAL

## 2024-11-01 ENCOUNTER — MYC MEDICAL ADVICE (OUTPATIENT)
Dept: GASTROENTEROLOGY | Facility: CLINIC | Age: 39
End: 2024-11-01
Payer: COMMERCIAL

## 2024-11-01 DIAGNOSIS — K50.811 CROHN'S DISEASE OF BOTH SMALL AND LARGE INTESTINE WITH RECTAL BLEEDING (H): Primary | ICD-10-CM

## 2024-11-01 NOTE — TELEPHONE ENCOUNTER
Per Dr Ivey:  -- Plan for FCP within 1 week prior to next injection  -- Start PA for Skyrizi Q6w  -- If FCP > 50, plan to escalate Skyrizi to Q6w

## 2024-11-01 NOTE — TELEPHONE ENCOUNTER
PA Initiation    Medication: SKYRIZI 360 MG/2.4ML SC SOCT  Insurance Company: [facility] Specialty Prior Auth Dept, phone  [phone], Fax [phone]  Pharmacy Filling the Rx:    Filling Pharmacy Phone:    Filling Pharmacy Fax:    Start Date: 11/1/2024  [id number]

## 2024-11-04 ENCOUNTER — OFFICE VISIT (OUTPATIENT)
Dept: FAMILY MEDICINE | Facility: CLINIC | Age: 39
End: 2024-11-04
Payer: COMMERCIAL

## 2024-11-04 VITALS
HEIGHT: 68 IN | RESPIRATION RATE: 20 BRPM | OXYGEN SATURATION: 97 % | TEMPERATURE: 98.1 F | WEIGHT: 285 LBS | HEART RATE: 77 BPM | DIASTOLIC BLOOD PRESSURE: 80 MMHG | SYSTOLIC BLOOD PRESSURE: 114 MMHG | BODY MASS INDEX: 43.19 KG/M2

## 2024-11-04 DIAGNOSIS — Z23 NEED FOR PROPHYLACTIC VACCINATION AND INOCULATION AGAINST INFLUENZA: ICD-10-CM

## 2024-11-04 DIAGNOSIS — K50.811 CROHN'S DISEASE OF BOTH SMALL AND LARGE INTESTINE WITH RECTAL BLEEDING (H): ICD-10-CM

## 2024-11-04 DIAGNOSIS — D84.9 IMMUNOCOMPROMISED STATE (H): ICD-10-CM

## 2024-11-04 DIAGNOSIS — F41.1 GENERALIZED ANXIETY DISORDER: ICD-10-CM

## 2024-11-04 DIAGNOSIS — Z30.41 SURVEILLANCE OF PREVIOUSLY PRESCRIBED CONTRACEPTIVE PILL: ICD-10-CM

## 2024-11-04 DIAGNOSIS — R41.840 ATTENTION AND CONCENTRATION DEFICIT: Primary | ICD-10-CM

## 2024-11-04 DIAGNOSIS — R45.89 DEPRESSED MOOD: ICD-10-CM

## 2024-11-04 DIAGNOSIS — K21.00 GASTROESOPHAGEAL REFLUX DISEASE WITH ESOPHAGITIS WITHOUT HEMORRHAGE: ICD-10-CM

## 2024-11-04 DIAGNOSIS — Z23 HIGH PRIORITY FOR 2019-NCOV VACCINE: ICD-10-CM

## 2024-11-04 PROCEDURE — 90480 ADMN SARSCOV2 VAC 1/ONLY CMP: CPT | Performed by: FAMILY MEDICINE

## 2024-11-04 PROCEDURE — 91320 SARSCV2 VAC 30MCG TRS-SUC IM: CPT | Performed by: FAMILY MEDICINE

## 2024-11-04 PROCEDURE — 99215 OFFICE O/P EST HI 40 MIN: CPT | Mod: 25 | Performed by: FAMILY MEDICINE

## 2024-11-04 PROCEDURE — 90656 IIV3 VACC NO PRSV 0.5 ML IM: CPT | Performed by: FAMILY MEDICINE

## 2024-11-04 PROCEDURE — 90471 IMMUNIZATION ADMIN: CPT | Performed by: FAMILY MEDICINE

## 2024-11-04 RX ORDER — BUPROPION HYDROCHLORIDE 300 MG/1
300 TABLET ORAL EVERY MORNING
Qty: 90 TABLET | Refills: 3 | Status: SHIPPED | OUTPATIENT
Start: 2024-11-04

## 2024-11-04 RX ORDER — FAMOTIDINE 20 MG/1
20 TABLET, FILM COATED ORAL 2 TIMES DAILY
Qty: 180 TABLET | Refills: 3 | Status: SHIPPED | OUTPATIENT
Start: 2024-11-04

## 2024-11-04 ASSESSMENT — PATIENT HEALTH QUESTIONNAIRE - PHQ9
SUM OF ALL RESPONSES TO PHQ QUESTIONS 1-9: 5
10. IF YOU CHECKED OFF ANY PROBLEMS, HOW DIFFICULT HAVE THESE PROBLEMS MADE IT FOR YOU TO DO YOUR WORK, TAKE CARE OF THINGS AT HOME, OR GET ALONG WITH OTHER PEOPLE: SOMEWHAT DIFFICULT
SUM OF ALL RESPONSES TO PHQ QUESTIONS 1-9: 5

## 2024-11-04 NOTE — PATIENT INSTRUCTIONS
- Set up a physical with a pap smear in the next few months.   - I put in a referral for an assessment of ADD with a specialist. You should be hearing back in the next 5 business days. Reach out if you don't hear back  - Keep your meds the same for now.  - Keep taking the famotidine twice daily. If worsening, let me know and we will talk about trying a different medication (omeprazole) or refer to gastroenterologist.Talk to your specialist about this.   - I will reach out to my pharmacist about your Shingrix question.

## 2024-11-04 NOTE — PROGRESS NOTES
Assessment & Plan     Attention and concentration deficit  Attention concerns remain despite improving fatigue & mood/anxiety on meds as below. Interfering with work tasks. No hyperactivity. Low suspicion for sleep disorder given reassuring sleep study & improvements in energy. ESTEFANI could be contributing. Would benefit from ADD evaluation.  - Adult Mental Health  Referral; Future    Generalized anxiety disorder  Chronic ESTEFANI. Doing well on citalopram & bupropion now. Some worsening anxiety with bupropion dose that has now mostly normalized. Concerns re: attention issues as above with need for ADD assessment.  - Adult Mental Health  Referral; Future    Depressed mood  Chronic mildly low mood, anhedonia, and reduced energy level. Dysthymia vs. MDD with persistent symptoms. Unlikely sleep disorder given reassuring sleep study. Some improvements in symptoms, especially energy level, with citalopram 20 mg augmented with bupropion  mg daily.   - Adult Mental Health  Referral; Future - ADD assessment ordered  - Continue citalopram  - Continue buPROPion (WELLBUTRIN XL) 300 MG 24 hr tablet; Take 1 tablet (300 mg) by mouth every morning.    High priority for 2019-nCoV vaccine  Due for seasonal COVID vaccine.   - COVID-19 12+ (PFIZER)    Need for prophylactic vaccination and inoculation against influenza  Due for seasonal influenza vaccine.   - INFLUENZA VACCINE,SPLIT VIRUS,TRIVALENT,PF(FLUZONE)    Surveillance of previously prescribed contraceptive pill  Chronic use. Doing well. Desiring refills today but also beginning to explore long-term options such as tubal ligation since she doesn't desire children. Her partner is not open to vasectomy.  - norethindrone-ethinyl estradiol (ORTHO-NOVUM) 1-35 MG-MCG tablet; Take 1 tablet by mouth daily.    Gastroesophageal reflux disease with esophagitis without hemorrhage  Chronic daily GERD, adequately controlled with H2 blocker but substantial symptoms  with missed doses. No previous PPI trial. Functional dyspepsia vs. med side effect from Skyrizi? No alarm findings.  - famotidine (PEPCID) 20 MG tablet; Take 1 tablet (20 mg) by mouth 2 times daily.  - Encouraged her to reach out to GI specialists to see if this might be a Skyrizi side effect. May benefit from treatment escalation to PPI if not improving.     Crohn's disease of both small and large intestine with rectal bleeding (H)  Diagnosed in summer 2023. Follows with GI. On Skyrizi with improvement in symptoms. Due for monitoring calprotectin sample - orders released today.   - Calprotectin Feces  - Candidate for Shingrix given immunosuppression. Will look into [employer] for obtaining PA with our pharmacy team. Otherwise up to date on vaccines after today's appointment.     Follow up in the next month for a preventive health care visit with pap smear.       I spent a total of 46 minutes on the day of the visit.   Time spent by me doing chart review, history and exam, documentation and further activities per the note    The longitudinal plan of care for the diagnosis(es)/condition(s) as documented were addressed during this visit. Due to the added complexity in care, I will continue to support Valorie in the subsequent management and with ongoing continuity of care.    Subjective   Valorie is a 39 year old, presenting for the following health issues:  Medication Follow-up, MH Follow Up, and Imm/Inj (COVID-19 VACCINE)        11/4/2024     3:23 PM   Additional Questions   Roomed by Paw         11/4/2024    Information    services provided? No        HPI     Here to follow up on mood and energy. Has been on Wellbutrin increased dose on 9/24/24. She feels like she is able to wake up better without so much effort. She no longer has any severe side effect. Mood has been pretty stable. Maybe slightly more anxious at times, but hard to disentangle from current society. No issues with insomnia since the  "first few days of the dose increase. Fatigue is less limiting recently since starting Wellbutrin. She continues to have attention issues at work and this is disruptive to her ability to accomplish tasks. This has been better with home work but she still struggles with this. No excessive movement or fidgeting. Never formally diagnosed previously with attention deficit.     PA for Shingrix - wants this due to immunocompromised state due to Skyrizi use for IBD. Not sure how to navigate PA for this.    Needs refills of her birth control pills. Considering long-term surgical solutions since she doesn't anticipate desiring children.     Has been taking famotidine twice daily consistently. With any missed doses, she reports worsening GERD. No particular food triggers. No abdominal pain. No sudden changes in weight. Bowels are at baseline for her.         Objective    /80   Pulse 77   Temp 98.1  F (36.7  C) (Tympanic)   Resp 20   Ht 1.727 m (5' 8\")   Wt 129.3 kg (285 lb)   SpO2 97%   BMI 43.33 kg/m    Body mass index is 43.33 kg/m .  Physical Exam   GENERAL: alert and no distress  RESP: normal rate and effort  ABDOMEN: soft, nontender, no hepatosplenomegaly, no masses   PSYCH: mentation appears normal, affect normal/bright      Answers submitted by the patient for this visit:  Patient Health Questionnaire (Submitted on 11/4/2024)  If you checked off any problems, how difficult have these problems made it for you to do your work, take care of things at home, or get along with other people?: Somewhat difficult  PHQ9 TOTAL SCORE: 5        3/31/2024    11:32 PM 5/5/2024     2:57 PM 8/5/2024     5:17 PM   ESTEFANI-7 SCORE   Total Score 4 (minimal anxiety) 4 (minimal anxiety) 5 (mild anxiety)   Total Score 4 4 5         Signed Electronically by: Silvia Groves MD    "

## 2024-11-04 NOTE — PROGRESS NOTES
Prior to immunization administration, verified patients identity using patient s name and date of birth. Please see Immunization Activity for additional information.     Screening Questionnaire for Adult Immunization    Are you sick today?   No   Do you have allergies to medications, food, a vaccine component or latex?   No   Have you ever had a serious reaction after receiving a vaccination?   No   Do you have a long-term health problem with heart, lung, kidney, or metabolic disease (e.g., diabetes), asthma, a blood disorder, no spleen, complement component deficiency, a cochlear implant, or a spinal fluid leak?  Are you on long-term aspirin therapy?   No   Do you have cancer, leukemia, HIV/AIDS, or any other immune system problem?   No   Do you have a parent, brother, or sister with an immune system problem?   No   In the past 3 months, have you taken medications that affect  your immune system, such as prednisone, other steroids, or anticancer drugs; drugs for the treatment of rheumatoid arthritis, Crohn s disease, or psoriasis; or have you had radiation treatments?   No   Have you had a seizure, or a brain or other nervous system problem?   No   During the past year, have you received a transfusion of blood or blood    products, or been given immune (gamma) globulin or antiviral drug?   No   For women: Are you pregnant or is there a chance you could become       pregnant during the next month?   No   Have you received any vaccinations in the past 4 weeks?   No     Immunization questionnaire answers were all negative.      Patient instructed to remain in clinic for 15 minutes afterwards, and to report any adverse reactions.     Screening performed by Barrera Jaramillo CMA on 11/4/2024 at 4:16 PM.

## 2024-11-07 PROCEDURE — 83993 ASSAY FOR CALPROTECTIN FECAL: CPT | Performed by: FAMILY MEDICINE

## 2024-11-08 ENCOUNTER — MYC MEDICAL ADVICE (OUTPATIENT)
Dept: GASTROENTEROLOGY | Facility: CLINIC | Age: 39
End: 2024-11-08
Payer: COMMERCIAL

## 2024-11-08 DIAGNOSIS — K50.811 CROHN'S DISEASE OF BOTH SMALL AND LARGE INTESTINE WITH RECTAL BLEEDING (H): Primary | ICD-10-CM

## 2024-11-08 LAB — CALPROTECTIN STL-MCNT: 412 MG/KG (ref 0–49.9)

## 2024-11-08 NOTE — TELEPHONE ENCOUNTER
They keep cancelling my request. I called and started a jesica pa over the phone and faxed chart notes.    fax [phone] case [id number]

## 2024-11-11 NOTE — TELEPHONE ENCOUNTER
PRIOR AUTHORIZATION DENIED    Medication: SKYRIZI 360 MG/2.4ML SC SOCT  Insurance Company: [facility] Prior Auth Dept, phone  [phone], Fax [phone]  Denial Date: 11/10/2024  Denial Reason(s):     Appeal Information:

## 2024-11-12 RX ORDER — BUDESONIDE 3 MG/1
9 CAPSULE, COATED PELLETS ORAL EVERY MORNING
Qty: 90 CAPSULE | Refills: 0 | Status: SHIPPED | OUTPATIENT
Start: 2024-11-12

## 2024-11-12 RX ORDER — BUDESONIDE 3 MG/1
9 CAPSULE, COATED PELLETS ORAL EVERY MORNING
Qty: 90 CAPSULE | Refills: 0 | Status: SHIPPED | OUTPATIENT
Start: 2024-11-12 | End: 2024-11-12

## 2024-11-12 NOTE — TELEPHONE ENCOUNTER
Per Dr. Ivey, plan to start Entocort to bridge to Skyrizi Q6w. Escalated Skyrizi frequency prior authorization currently in appeal state.

## 2024-11-13 NOTE — TELEPHONE ENCOUNTER
Medication Appeal Initiation    Medication: SKYRIZI 360 MG/2.4ML SC SOCT  Appeal Start Date:  11/13/2024  Insurance Company: [employer]  Insurance Phone:  [phone]   Insurance Fax: [phone]  Comments:     Faxed appeal letter

## 2024-11-14 DIAGNOSIS — K50.811 CROHN'S DISEASE OF BOTH SMALL AND LARGE INTESTINE WITH RECTAL BLEEDING (H): Primary | ICD-10-CM

## 2024-11-14 RX ORDER — RISANKIZUMAB-RZAA 180 MG/1.2
180 KIT SUBCUTANEOUS ONCE
Qty: 1.2 ML | Refills: 0 | Status: SHIPPED | OUTPATIENT
Start: 2024-11-14 | End: 2024-11-14

## 2024-11-14 NOTE — PROGRESS NOTES
One time Skyrizi dose order placed per discussion with Damir Ryder PA-C given misfire.    Will plan on 180 mg dose given she believes she received most of her dose. Currently appealing for Q6 dosing.

## 2024-11-15 DIAGNOSIS — K50.818 CROHN'S DISEASE OF BOTH SMALL AND LARGE INTESTINE WITH OTHER COMPLICATION (H): Primary | ICD-10-CM

## 2024-11-15 RX ORDER — RISANKIZUMAB-RZAA 360 MG/2.4
360 WEARABLE INJECTOR SUBCUTANEOUS
Qty: 2.4 ML | Refills: 3 | Status: SHIPPED | OUTPATIENT
Start: 2024-11-15

## 2024-11-15 NOTE — TELEPHONE ENCOUNTER
MEDICATION APPEAL APPROVED    Medication: SKYRIZI 360 MG/2.4ML SC SOCT  Authorization Effective Date: 10/16/2024  Authorization Expiration Date: 11/15/2025  Approved Dose/Quantity: 2.4/42  Reference #: [id number]   Appeal Insurance Company:  [employer]  Expected CoPay: $       CoPay Card Available:    Financial Assistance Needed:    Filling Pharmacy: [facility]  Patient Notified:  Yes spoke with Valorie  Comments:

## 2024-11-20 ENCOUNTER — MYC MEDICAL ADVICE (OUTPATIENT)
Dept: CARE COORDINATION | Facility: CLINIC | Age: 39
End: 2024-11-20
Payer: COMMERCIAL

## 2024-11-20 SDOH — HEALTH STABILITY: PHYSICAL HEALTH: ON AVERAGE, HOW MANY DAYS PER WEEK DO YOU ENGAGE IN MODERATE TO STRENUOUS EXERCISE (LIKE A BRISK WALK)?: 1 DAY

## 2024-11-20 SDOH — HEALTH STABILITY: PHYSICAL HEALTH: ON AVERAGE, HOW MANY MINUTES DO YOU ENGAGE IN EXERCISE AT THIS LEVEL?: 30 MIN

## 2024-11-20 ASSESSMENT — SOCIAL DETERMINANTS OF HEALTH (SDOH): HOW OFTEN DO YOU GET TOGETHER WITH FRIENDS OR RELATIVES?: ONCE A WEEK

## 2024-11-25 ENCOUNTER — OFFICE VISIT (OUTPATIENT)
Dept: FAMILY MEDICINE | Facility: CLINIC | Age: 39
End: 2024-11-25
Payer: COMMERCIAL

## 2024-11-25 VITALS
BODY MASS INDEX: 39.86 KG/M2 | WEIGHT: 263 LBS | DIASTOLIC BLOOD PRESSURE: 76 MMHG | HEART RATE: 78 BPM | SYSTOLIC BLOOD PRESSURE: 112 MMHG | OXYGEN SATURATION: 97 % | RESPIRATION RATE: 20 BRPM | HEIGHT: 68 IN | TEMPERATURE: 98.1 F

## 2024-11-25 DIAGNOSIS — E78.5 DYSLIPIDEMIA (HIGH LDL; LOW HDL): ICD-10-CM

## 2024-11-25 DIAGNOSIS — E66.812 CLASS 2 OBESITY DUE TO EXCESS CALORIES WITHOUT SERIOUS COMORBIDITY WITH BODY MASS INDEX (BMI) OF 39.0 TO 39.9 IN ADULT: ICD-10-CM

## 2024-11-25 DIAGNOSIS — D84.9 IMMUNOCOMPROMISED STATE (H): ICD-10-CM

## 2024-11-25 DIAGNOSIS — R73.03 PREDIABETES: ICD-10-CM

## 2024-11-25 DIAGNOSIS — Z00.00 ROUTINE GENERAL MEDICAL EXAMINATION AT A HEALTH CARE FACILITY: Primary | ICD-10-CM

## 2024-11-25 DIAGNOSIS — E66.09 CLASS 2 OBESITY DUE TO EXCESS CALORIES WITHOUT SERIOUS COMORBIDITY WITH BODY MASS INDEX (BMI) OF 39.0 TO 39.9 IN ADULT: ICD-10-CM

## 2024-11-25 DIAGNOSIS — Z12.4 CERVICAL CANCER SCREENING: ICD-10-CM

## 2024-11-25 DIAGNOSIS — Z11.3 ROUTINE SCREENING FOR STI (SEXUALLY TRANSMITTED INFECTION): ICD-10-CM

## 2024-11-25 PROBLEM — R19.5 ELEVATED FECAL CALPROTECTIN: Status: RESOLVED | Noted: 2023-07-21 | Resolved: 2024-11-25

## 2024-11-25 PROBLEM — K58.9 IBS (IRRITABLE BOWEL SYNDROME): Status: RESOLVED | Noted: 2021-06-21 | Resolved: 2024-11-25

## 2024-11-25 PROBLEM — E66.01 MORBID OBESITY (H): Status: RESOLVED | Noted: 2021-10-04 | Resolved: 2024-11-25

## 2024-11-25 PROBLEM — E88.09 HYPOALBUMINEMIA: Status: RESOLVED | Noted: 2022-07-01 | Resolved: 2024-11-25

## 2024-11-25 PROCEDURE — 87624 HPV HI-RISK TYP POOLED RSLT: CPT | Performed by: FAMILY MEDICINE

## 2024-11-25 PROCEDURE — 87491 CHLMYD TRACH DNA AMP PROBE: CPT | Performed by: FAMILY MEDICINE

## 2024-11-25 PROCEDURE — 87591 N.GONORRHOEAE DNA AMP PROB: CPT | Performed by: FAMILY MEDICINE

## 2024-11-25 PROCEDURE — 99395 PREV VISIT EST AGE 18-39: CPT | Performed by: FAMILY MEDICINE

## 2024-11-25 NOTE — PROGRESS NOTES
Preventive Care Visit  [facility]  April MACIENed Groves MD, Family Medicine  Nov 25, 2024      Assessment & Plan     Routine general medical examination at a health care facility  Routine preventive exam. We addressed the following. Vaccines are up to date with the exception of Shingrix, which is indicated due to immunocompromised status.     Cervical cancer screening  Due for routine screening with co-testing. No previous abnormalities.   - HPV and Gynecologic Cytology Panel - Recommended Age 30 - 65 Years    Class 2 obesity due to excess calories without serious comorbidity with body mass index (BMI) of 39.0 to 39.9 in adult  Body mass index is 39.99 kg/m . Weight has dropped recently due to Crohn's symptom flare while struggling with med access. Co-morbid prediabetes & dyslipidemia.   - Reviewed healthy lifestyle changes - diet & physical activity, both cardio and strength training. Opportunity to increase physical activity. Discussed gradual changes to work towards a more sustainable approach.    Prediabetes  A1c of 6.1 in September, down from 6.5 in July 2023. Working on healthy lifestyle changes. Could consider metformin in follow up though some concern about worsening bowel symptoms given Crohn's & short gut.    Dyslipidemia (high LDL; low HDL)  History of low HDL and mildly elevated LDL. Will reassess with next blood draw.   - Lipid panel reflex to direct LDL Non-fasting; Future  - Working on healthy lifestyle changes as above.    Immunocompromised state (H)  Shingrix vaccine is indicated due to Skyrizi use for Crohn's disease.  - Prescription sent previously to [facility] due to need for prior authorization. Encouraged follow up if any additional support is needed.    Routine screening for STI (sexually transmitted infection)  Asymptomatic. No concerns. Recent dysuria episode more likely represents a self-resolving UTI.  - Chlamydia trachomatis/Neisseria gonorrhoeae by  PCR    Counseling  Appropriate preventive services were addressed with this patient via screening, questionnaire, or discussion as appropriate for fall prevention, nutrition, physical activity, Tobacco-use cessation, social engagement, weight loss and cognition.  Checklist reviewing preventive services available has been given to the patient.  Reviewed patient's diet, addressing concerns and/or questions.   She is at risk for lack of exercise and has been provided with information to increase physical activity for the benefit of her well-being.   She is at risk for psychosocial distress and has been provided with information to reduce risk.     Return in about 53 weeks (around 12/1/2025) for Annual Wellness Visit.    Juliet Eugene is a 39 year old, presenting for the following:  Physical ( and pap)        11/25/2024     2:18 PM   Additional Questions   Roomed by Paw         11/25/2024    Information    services provided? No             HPI    Brief dysuria a few weeks ago that lasted for about 4 days. Improved with fluids. No vaginal discharge or irritation. No bleeding. No belly pain. Wonders if it is from increased sexual activity recently.     Hasn't heard back about the Shingles vaccine yet from [facility].    More recently has struggled with access to Crohn's meds and this caused a flare in symptoms.     Health Care Directive  Patient does not have a Health Care Directive: Discussed advance care planning with patient; information given to patient to review.      11/20/2024   General Health   How would you rate your overall physical health? (!) FAIR   Feel stress (tense, anxious, or unable to sleep) To some extent      (!) STRESS CONCERN      11/20/2024   Nutrition   Three or more servings of calcium each day? (!) NO: 1-3 servings per day, often cheese & broccoli    Diet: Other   If other, please elaborate: Avoid foods that trigger crohn's reactions.   How many servings of fruit  and vegetables per day? (!) 0-1   How many sweetened beverages each day? (!) 3            11/20/2024   Exercise   Days per week of moderate/strenous exercise 1 day   Average minutes spent exercising at this level 30 min      (!) EXERCISE CONCERN      11/20/2024   Social Factors   Frequency of gathering with friends or relatives Once a week   Worry food won't last until get money to buy more No   Food not last or not have enough money for food? No   Do you have housing? (Housing is defined as stable permanent housing and does not include staying ouside in a car, in a tent, in an abandoned building, in an overnight shelter, or couch-surfing.) Yes   Are you worried about losing your housing? No   Lack of transportation? No   Unable to get utilities (heat,electricity)? No            11/20/2024   Dental   Dentist two times every year? Yes          Today's PHQ-2 Score:       11/25/2024     2:16 PM   PHQ-2 ( 1999 [employer])   Q1: Little interest or pleasure in doing things 1    Q2: Feeling down, depressed or hopeless 1    PHQ-2 Score 2    Q1: Little interest or pleasure in doing things Several days   Q2: Feeling down, depressed or hopeless Several days   PHQ-2 Score 2       Patient-reported           11/20/2024   Substance Use   Alcohol more than 3/day or more than 7/wk Not Applicable   Do you use any other substances recreationally? No        Social History     Tobacco Use    Smoking status: Never     Passive exposure: Never    Smokeless tobacco: Never   Vaping Use    Vaping status: Never Used   Substance Use Topics    Alcohol use: Not Currently     Comment: Rare    Drug use: Never     Mammogram Screening - Patient under 40 years of age: Routine Mammogram Screening not recommended.         11/20/2024   STI Screening   New sexual partner(s) since last STI/HIV test? No        History of abnormal Pap smear: No - age 30- 64 PAP with HPV every 5 years recommended        10/2/2018     4:59 PM   PAP / HPV   PAP-ABSTRACT See Scanned  "Document           This result is from an external source.           11/20/2024   Contraception/Family Planning   Questions about contraception or family planning No        Reviewed and updated as needed this visit by Provider   Tobacco  Allergies  Meds  Problems  Med Hx  Surg Hx  Fam Hx                 Objective    Exam  /76   Pulse 78   Temp 98.1  F (36.7  C) (Oral)   Resp 20   Ht 1.727 m (5' 8\")   Wt 119.3 kg (263 lb)   SpO2 97%   BMI 39.99 kg/m     Estimated body mass index is 39.99 kg/m  as calculated from the following:    Height as of this encounter: 1.727 m (5' 8\").    Weight as of this encounter: 119.3 kg (263 lb).    Physical Exam  GENERAL: alert and no distress  EYES: Eyes grossly normal to inspection, PERRL and conjunctivae and sclerae normal  HENT: ear canals and TM's normal, nose and mouth without ulcers or lesions  NECK: no adenopathy, no asymmetry, masses, or scars  RESP: lungs clear to auscultation - no rales, rhonchi or wheezes  CV: regular rate and rhythm, normal S1 S2, no S3 or S4, no murmur, click or rub, no peripheral edema  ABDOMEN: soft, nontender, no hepatosplenomegaly, no masses and bowel sounds normal   (female): normal female external genitalia, normal urethral meatus, normal vaginal mucosa, nulliparous cervix with physiologic appearing discharge  MS: no gross musculoskeletal defects noted, no edema  SKIN: no suspicious lesions or rashes  PSYCH: mentation appears normal, affect normal/bright        Signed Electronically by: Silvia Groves MD    "

## 2024-11-25 NOTE — PATIENT INSTRUCTIONS
- Make sure that you are getting enough calcium (3 servings per day)  - Get your Shingles vaccine  - Work on increasing your physical activity and add in some strength   - Get your cholesterol rechecked later this year with your next lab draw. No need to fast.    Patient Education   Preventive Care Advice   This is general advice given by our system to help you stay healthy. However, your care team may have specific advice just for you. Please talk to your care team about your preventive care needs.  Nutrition  Eat 5 or more servings of fruits and vegetables each day.  Try wheat bread, brown rice and whole grain pasta (instead of white bread, rice, and pasta).  Get enough calcium and vitamin D. Check the label on foods and aim for 100% of the RDA (recommended daily allowance).  Lifestyle  Exercise at least 150 minutes each week  (30 minutes a day, 5 days a week).  Do muscle strengthening activities 2 days a week. These help control your weight and prevent disease.  No smoking.  Wear sunscreen to prevent skin cancer.  Have a dental exam and cleaning every 6 months.  Yearly exams  See your health care team every year to talk about:  Any changes in your health.  Any medicines your care team has prescribed.  Preventive care, family planning, and ways to prevent chronic diseases.  Shots (vaccines)   HPV shots (up to age 26), if you've never had them before.  Hepatitis B shots (up to age 59), if you've never had them before.  COVID-19 shot: Get this shot when it's due.  Flu shot: Get a flu shot every year.  Tetanus shot: Get a tetanus shot every 10 years.  Pneumococcal, hepatitis A, and RSV shots: Ask your care team if you need these based on your risk.  Shingles shot (for age 50 and up)  General health tests  Diabetes screening:  Starting at age 35, Get screened for diabetes at least every 3 years.  If you are younger than age 35, ask your care team if you should be screened for diabetes.  Cholesterol test: At age 39,  start having a cholesterol test every 5 years, or more often if advised.  Bone density scan (DEXA): At age 50, ask your care team if you should have this scan for osteoporosis (brittle bones).  Hepatitis C: Get tested at least once in your life.  STIs (sexually transmitted infections)  Before age 24: Ask your care team if you should be screened for STIs.  After age 24: Get screened for STIs if you're at risk. You are at risk for STIs (including HIV) if:  You are sexually active with more than one person.  You don't use condoms every time.  You or a partner was diagnosed with a sexually transmitted infection.  If you are at risk for HIV, ask about PrEP medicine to prevent HIV.  Get tested for HIV at least once in your life, whether you are at risk for HIV or not.  Cancer screening tests  Cervical cancer screening: If you have a cervix, begin getting regular cervical cancer screening tests starting at age 21.  Breast cancer scan (mammogram): If you've ever had breasts, begin having regular mammograms starting at age 40. This is a scan to check for breast cancer.  Colon cancer screening: It is important to start screening for colon cancer at age 45.  Have a colonoscopy test every 10 years (or more often if you're at risk) Or, ask your provider about stool tests like a FIT test every year or Cologuard test every 3 years.  To learn more about your testing options, visit:   .  For help making a decision, visit:   https://bit.ly/oe88270.  Prostate cancer screening test: If you have a prostate, ask your care team if a prostate cancer screening test (PSA) at age 55 is right for you.  Lung cancer screening: If you are a current or former smoker ages 50 to 80, ask your care team if ongoing lung cancer screenings are right for you.  For informational purposes only. Not to replace the advice of your health care provider. Copyright   2023 [employer] All rights reserved. Clinically reviewed by the [facility]  Transitions Program. [employer] 157295 - REV 01/24.  Learning About Stress  What is stress?     Stress is your body's response to a hard situation. Your body can have a physical, emotional, or mental response. Stress is a fact of life for most people, and it affects everyone differently. What causes stress for you may not be stressful for someone else.  A lot of things can cause stress. You may feel stress when you go on a job interview, take a test, or run a race. This kind of short-term stress is normal and even useful. It can help you if you need to work hard or react quickly. For example, stress can help you finish an important job on time.  Long-term stress is caused by ongoing stressful situations or events. Examples of long-term stress include long-term health problems, ongoing problems at work, or conflicts in your family. Long-term stress can harm your health.  How does stress affect your health?  When you are stressed, your body responds as though you are in danger. It makes hormones that speed up your heart, make you breathe faster, and give you a burst of energy. This is called the fight-or-flight stress response. If the stress is over quickly, your body goes back to normal and no harm is done.  But if stress happens too often or lasts too long, it can have bad effects. Long-term stress can make you more likely to get sick, and it can make symptoms of some diseases worse. If you tense up when you are stressed, you may develop neck, shoulder, or low back pain. Stress is linked to high blood pressure and heart disease.  Stress also harms your emotional health. It can make you matt, tense, or depressed. Your relationships may suffer, and you may not do well at work or school.  What can you do to manage stress?  You can try these things to help manage stress:   Do something active. Exercise or activity can help reduce stress. Walking is a great way to get started. Even everyday activities such as  housecleaning or yard work can help.  Try yoga or dang chi. These techniques combine exercise and meditation. You may need some training at first to learn them.  Do something you enjoy. For example, listen to music or go to a movie. Practice your hobby or do volunteer work.  Meditate. This can help you relax, because you are not worrying about what happened before or what may happen in the future.  Do guided imagery. Imagine yourself in any setting that helps you feel calm. You can use online videos, books, or a teacher to guide you.  Do breathing exercises. For example:  From a standing position, bend forward from the waist with your knees slightly bent. Let your arms dangle close to the floor.  Breathe in slowly and deeply as you return to a standing position. Roll up slowly and lift your head last.  Hold your breath for just a few seconds in the standing position.  Breathe out slowly and bend forward from the waist.  Let your feelings out. Talk, laugh, cry, and express anger when you need to. Talking with supportive friends or family, a counselor, or a coco leader about your feelings is a healthy way to relieve stress. Avoid discussing your feelings with people who make you feel worse.  Write. It may help to write about things that are bothering you. This helps you find out how much stress you feel and what is causing it. When you know this, you can find better ways to cope.  What can you do to prevent stress?  You might try some of these things to help prevent stress:  Manage your time. This helps you find time to do the things you want and need to do.  Get enough sleep. Your body recovers from the stresses of the day while you are sleeping.  Get support. Your family, friends, and community can make a difference in how you experience stress.  Limit your news feed. Avoid or limit time on social media or news that may make you feel stressed.  Do something active. Exercise or activity can help reduce stress.  "Walking is a great way to get started.  Where can you learn more?  Go to https://www.Yohobuy.net/patiented  Enter N032 in the search box to learn more about \"Learning About Stress.\"  Current as of: October 24, 2023  Content Version: 14.2 2024 [facility].   Care instructions adapted under license by your healthcare professional. If you have questions about a medical condition or this instruction, always ask your healthcare professional. [employer] disclaims any warranty or liability for your use of this information.       "

## 2024-11-26 LAB
C TRACH DNA SPEC QL PROBE+SIG AMP: NEGATIVE
HPV HR 12 DNA CVX QL NAA+PROBE: NEGATIVE
HPV16 DNA CVX QL NAA+PROBE: NEGATIVE
HPV18 DNA CVX QL NAA+PROBE: NEGATIVE
HUMAN PAPILLOMA VIRUS FINAL DIAGNOSIS: NORMAL
N GONORRHOEA DNA SPEC QL NAA+PROBE: NEGATIVE

## 2024-12-02 LAB
BKR AP ASSOCIATED HPV REPORT: NORMAL
BKR LAB AP GYN ADEQUACY: NORMAL
BKR LAB AP GYN INTERPRETATION: NORMAL
BKR LAB AP PREVIOUS ABNORMAL: NORMAL
PATH REPORT.COMMENTS IMP SPEC: NORMAL
PATH REPORT.COMMENTS IMP SPEC: NORMAL
PATH REPORT.RELEVANT HX SPEC: NORMAL

## 2025-02-17 ENCOUNTER — E-VISIT (OUTPATIENT)
Dept: URGENT CARE | Facility: CLINIC | Age: 40
End: 2025-02-17
Payer: COMMERCIAL

## 2025-02-17 DIAGNOSIS — B96.89 ACUTE BACTERIAL SINUSITIS: Primary | ICD-10-CM

## 2025-02-17 DIAGNOSIS — J01.90 ACUTE BACTERIAL SINUSITIS: Primary | ICD-10-CM

## 2025-02-17 RX ORDER — BENZONATATE 100 MG/1
100 CAPSULE ORAL 3 TIMES DAILY PRN
Qty: 30 CAPSULE | Refills: 0 | Status: SHIPPED | OUTPATIENT
Start: 2025-02-17

## 2025-02-17 NOTE — PATIENT INSTRUCTIONS
Thank you for choosing us for your care. I have placed an order for a prescription so that you can start treatment:  Orders Placed This Encounter   Medications     amoxicillin-clavulanate (AUGMENTIN) 875-125 MG tablet     Sig: Take 1 tablet by mouth 2 times daily for 7 days.     Dispense:  14 tablet     Refill:  0     benzonatate (TESSALON) 100 MG capsule     Sig: Take 1 capsule (100 mg) by mouth 3 times daily as needed for cough.     Dispense:  30 capsule     Refill:  0          View your full visit summary for details by clicking on the link below. Your pharmacist will able to address any questions you may have about the medication.     If you're not feeling better within 5-7 days, please schedule an appointment.  You can schedule an appointment right here in [facility], or call [phone]  If the visit is for the same symptoms as your eVisit, we'll refund the cost of your eVisit if seen within seven days.

## 2025-02-25 ENCOUNTER — OFFICE VISIT (OUTPATIENT)
Dept: FAMILY MEDICINE | Facility: CLINIC | Age: 40
End: 2025-02-25
Payer: COMMERCIAL

## 2025-02-25 VITALS
OXYGEN SATURATION: 97 % | BODY MASS INDEX: 39.56 KG/M2 | SYSTOLIC BLOOD PRESSURE: 114 MMHG | HEIGHT: 68 IN | DIASTOLIC BLOOD PRESSURE: 78 MMHG | HEART RATE: 83 BPM | RESPIRATION RATE: 22 BRPM | WEIGHT: 261 LBS | TEMPERATURE: 97.7 F

## 2025-02-25 DIAGNOSIS — R05.2 SUBACUTE COUGH: Primary | ICD-10-CM

## 2025-02-25 DIAGNOSIS — J45.21 MILD INTERMITTENT ASTHMA WITH EXACERBATION: ICD-10-CM

## 2025-02-25 RX ORDER — PREDNISONE 20 MG/1
20 TABLET ORAL DAILY
Qty: 5 TABLET | Refills: 0 | Status: SHIPPED | OUTPATIENT
Start: 2025-02-25 | End: 2025-03-02

## 2025-02-25 ASSESSMENT — ASTHMA QUESTIONNAIRES
QUESTION_2 LAST FOUR WEEKS HOW OFTEN HAVE YOU HAD SHORTNESS OF BREATH: ONCE A DAY
QUESTION_1 LAST FOUR WEEKS HOW MUCH OF THE TIME DID YOUR ASTHMA KEEP YOU FROM GETTING AS MUCH DONE AT WORK, SCHOOL OR AT HOME: SOME OF THE TIME
QUESTION_5 LAST FOUR WEEKS HOW WOULD YOU RATE YOUR ASTHMA CONTROL: SOMEWHAT CONTROLLED
QUESTION_3 LAST FOUR WEEKS HOW OFTEN DID YOUR ASTHMA SYMPTOMS (WHEEZING, COUGHING, SHORTNESS OF BREATH, CHEST TIGHTNESS OR PAIN) WAKE YOU UP AT NIGHT OR EARLIER THAN USUAL IN THE MORNING: NOT AT ALL
ACT_TOTALSCORE: 16
ACT_TOTALSCORE: 16
QUESTION_4 LAST FOUR WEEKS HOW OFTEN HAVE YOU USED YOUR RESCUE INHALER OR NEBULIZER MEDICATION (SUCH AS ALBUTEROL): TWO OR THREE TIMES PER WEEK

## 2025-02-25 NOTE — PROGRESS NOTES
"  Assessment & Plan     Subacute cough  Congestion  Sore Throat  Pt cough has not improved with course of Augmentin and has been using her rescue inhaler more frequently than in the past. Pt denies fever, but has had congestion, intermittent sore throat, and coughing fits. On exam, pt is diminished breath sounds but no wheezing. Pt's current symptoms are likely related to an asthma exacerbation. Will start a short course of prednisone and obtain a Pertussis PCR and CXR to rule out other possible underlying etiologies.   - predniSONE (DELTASONE) 20 MG tablet  Dispense: 5 tablet; Refill: 0  - Bordetella pertussis parapertussis, PCR  - XR CHEST 2 VW  - If pertussis postive, would likely treat with azithromycin due to patient's immunosuppression on Skyrizi.     Mild intermittent asthma with exacerbation  Endorses coughing fits. Advised to continue using her rescue inhaler and complete a short course of prednisone. If she did not feel like short course was effective, advised that should could discontinue it early.  - predniSONE (DELTASONE) 20 MG tablet  Dispense: 5 tablet; Refill: 0    BMI  Estimated body mass index is 39.68 kg/m  as calculated from the following:    Height as of this encounter: 1.727 m (5' 8\").    Weight as of this encounter: 118.4 kg (261 lb).     No follow-ups on file.    Juliet Eugene is a 39 year old PMH of crohn's disease on immunosupressants and asthma, presenting for the following health issues:  RECHECK (Cough has been making patient Asthma worse, started the 7th of February )        2/25/2025     3:19 PM   Additional Questions   Roomed by Dara Lazar   Accompanied by Self         2/25/2025    Information    services provided? No     HPI     Pt has been experiencing about of month of cough/congestion as well as a intermittent sore throat.    Cough started 3-4 weeks ago and has been mostly dry. Yet, she sometimes coughs up green/yellow sputum. Had an e-visit on the " "17th and started a Augmentin course which she finished yesterday. She states that the cough didn't improve during the abx course. She also has asthma that worsened during the abx course and needed to use her rescue inhaler (levalbuterol). States that she has had to use her inhaler 1-2 times a day, which is much more frequently that she has used in the past. When she does cough, it's a coughing fit. She also states her coughing is very forceful and will result in a sore throat. Denies coughing till she vomits and has not been around children. Cough improved with cough drops but cold medicine did not help.    She endorses that she still has been congested, but unsure if it is her allergies. Also endorses diarrhea  but is unsure if it is from her Crohn's. She sometimes feels like there is drainage in the back of her throat.    Denies having a cough that has ever lasted this long, and denies fever, burning sensation, c/p or back pain.    Review of Systems  Constitutional, HEENT, cardiovascular, pulmonary, gi and gu systems are negative, except as otherwise noted.      Objective    /78 (BP Location: Right arm, Patient Position: Sitting, Cuff Size: Adult Large)   Pulse 83   Temp 97.7  F (36.5  C) (Oral)   Resp 22   Ht 1.727 m (5' 8\")   Wt 118.4 kg (261 lb)   LMP  (LMP Unknown)   SpO2 97%   BMI 39.68 kg/m    Body mass index is 39.68 kg/m .    Physical Exam   GENERAL: alert and no distress  NECK: no adenopathy, no asymmetry, masses, or scars  RESP: lungs clear to auscultation - no rales, rhonchi or wheezes, some partially diminished breath sounds  CV: regular rate and rhythm, normal S1 S2, no murmur, click or rub, no peripheral edema  ABDOMEN: soft, nontender, no hepatosplenomegaly, no masses and bowel sounds normal  MS: no gross musculoskeletal defects noted, no edema    No results found for any visits on 02/25/25.  No results found for this or any previous visit (from the past 24 hours).  No results found " for this visit on 02/25/25.      Raegan Duarte, MS4    I was present with the medical student who participated in the service and in the documentation of this note. I have verified the history and personally performed the physical exam and medical decision making, and have verified the content of the note, which accurately reflects my assessment of the patient and the plan of care.     Jenise Lares MD, PGY-3  [facility]   Pager #: [phone]      The longitudinal plan of care for the diagnosis(es)/condition(s) as documented were addressed during this visit. Due to the added complexity in care, I will continue to support Valorie in the subsequent management and with ongoing continuity of care.     Signed Electronically by: Jenise Lares MD

## 2025-02-25 NOTE — LETTER
Date: Feb 25, 2025    TO WHOM IT MAY CONCERN:    Patient Lou Treviño was seen on Feb 25, 2025 in the afternoon for illness. Please excuse here 2/25/25 afternoon and 2/26/25 for appointments related to her illness. Her current illness is disrupting her sleep schedule, so please excuse any tardiness this week.     Jenise Lares MD

## 2025-02-26 ENCOUNTER — ANCILLARY PROCEDURE (OUTPATIENT)
Dept: GENERAL RADIOLOGY | Facility: CLINIC | Age: 40
End: 2025-02-26
Attending: STUDENT IN AN ORGANIZED HEALTH CARE EDUCATION/TRAINING PROGRAM
Payer: COMMERCIAL

## 2025-02-26 ENCOUNTER — LAB (OUTPATIENT)
Dept: LAB | Facility: CLINIC | Age: 40
End: 2025-02-26
Payer: COMMERCIAL

## 2025-02-26 DIAGNOSIS — R05.2 SUBACUTE COUGH: ICD-10-CM

## 2025-02-26 DIAGNOSIS — K50.818 CROHN'S DISEASE OF BOTH SMALL AND LARGE INTESTINE WITH OTHER COMPLICATION (H): ICD-10-CM

## 2025-02-26 LAB
BASOPHILS # BLD AUTO: 0.1 10E3/UL (ref 0–0.2)
BASOPHILS NFR BLD AUTO: 1 %
EOSINOPHIL # BLD AUTO: 0.5 10E3/UL (ref 0–0.7)
EOSINOPHIL NFR BLD AUTO: 6 %
ERYTHROCYTE [DISTWIDTH] IN BLOOD BY AUTOMATED COUNT: 12.4 % (ref 10–15)
HCT VFR BLD AUTO: 40.9 % (ref 35–47)
HGB BLD-MCNC: 13.2 G/DL (ref 11.7–15.7)
IMM GRANULOCYTES # BLD: 0 10E3/UL
IMM GRANULOCYTES NFR BLD: 0 %
LYMPHOCYTES # BLD AUTO: 3.5 10E3/UL (ref 0.8–5.3)
LYMPHOCYTES NFR BLD AUTO: 41 %
MCH RBC QN AUTO: 27.8 PG (ref 26.5–33)
MCHC RBC AUTO-ENTMCNC: 32.3 G/DL (ref 31.5–36.5)
MCV RBC AUTO: 86 FL (ref 78–100)
MONOCYTES # BLD AUTO: 0.6 10E3/UL (ref 0–1.3)
MONOCYTES NFR BLD AUTO: 7 %
NEUTROPHILS # BLD AUTO: 3.8 10E3/UL (ref 1.6–8.3)
NEUTROPHILS NFR BLD AUTO: 45 %
PLATELET # BLD AUTO: 272 10E3/UL (ref 150–450)
RBC # BLD AUTO: 4.75 10E6/UL (ref 3.8–5.2)
WBC # BLD AUTO: 8.5 10E3/UL (ref 4–11)

## 2025-02-26 PROCEDURE — 71046 X-RAY EXAM CHEST 2 VIEWS: CPT | Mod: TC | Performed by: RADIOLOGY

## 2025-02-27 LAB
ALBUMIN SERPL BCG-MCNC: 3.9 G/DL (ref 3.5–5.2)
ALP SERPL-CCNC: 67 U/L (ref 40–150)
ALT SERPL W P-5'-P-CCNC: 24 U/L (ref 0–50)
ANION GAP SERPL CALCULATED.3IONS-SCNC: 12 MMOL/L (ref 7–15)
AST SERPL W P-5'-P-CCNC: 21 U/L (ref 0–45)
B PARAPERT DNA SPEC QL NAA+PROBE: NOT DETECTED
B PERT DNA SPEC QL NAA+PROBE: NOT DETECTED
BILIRUB SERPL-MCNC: 0.2 MG/DL
BUN SERPL-MCNC: 7.9 MG/DL (ref 6–20)
CALCIUM SERPL-MCNC: 9 MG/DL (ref 8.8–10.4)
CHLORIDE SERPL-SCNC: 103 MMOL/L (ref 98–107)
CREAT SERPL-MCNC: 0.79 MG/DL (ref 0.51–0.95)
CRP SERPL-MCNC: 13.4 MG/L
EGFRCR SERPLBLD CKD-EPI 2021: >90 ML/MIN/1.73M2
GLUCOSE SERPL-MCNC: 114 MG/DL (ref 70–99)
HCO3 SERPL-SCNC: 24 MMOL/L (ref 22–29)
POTASSIUM SERPL-SCNC: 4.1 MMOL/L (ref 3.4–5.3)
PROT SERPL-MCNC: 7.6 G/DL (ref 6.4–8.3)
SODIUM SERPL-SCNC: 139 MMOL/L (ref 135–145)

## 2025-03-10 ENCOUNTER — TELEPHONE (OUTPATIENT)
Dept: GASTROENTEROLOGY | Facility: CLINIC | Age: 40
End: 2025-03-10
Payer: COMMERCIAL

## 2025-03-10 ENCOUNTER — VIRTUAL VISIT (OUTPATIENT)
Dept: PHARMACY | Facility: CLINIC | Age: 40
End: 2025-03-10
Attending: INTERNAL MEDICINE
Payer: COMMERCIAL

## 2025-03-10 VITALS — WEIGHT: 265 LBS | BODY MASS INDEX: 40.16 KG/M2 | HEIGHT: 68 IN

## 2025-03-10 DIAGNOSIS — K50.818 CROHN'S DISEASE OF BOTH SMALL AND LARGE INTESTINE WITH OTHER COMPLICATION (H): Primary | ICD-10-CM

## 2025-03-10 DIAGNOSIS — D84.9 IMMUNOCOMPROMISED STATE: ICD-10-CM

## 2025-03-10 DIAGNOSIS — R05.2 SUBACUTE COUGH: ICD-10-CM

## 2025-03-10 RX ORDER — RISANKIZUMAB-RZAA 360 MG/2.4
360 WEARABLE INJECTOR SUBCUTANEOUS
Qty: 2.4 ML | Refills: 3 | Status: SHIPPED | OUTPATIENT
Start: 2025-03-10

## 2025-03-10 RX ORDER — ZOSTER VACCINE RECOMBINANT, ADJUVANTED 50 MCG/0.5
1 KIT INTRAMUSCULAR ONCE
Qty: 0.5 ML | Refills: 1 | Status: SHIPPED | OUTPATIENT
Start: 2025-03-10 | End: 2025-03-10

## 2025-03-10 ASSESSMENT — PAIN SCALES - GENERAL: PAINLEVEL_OUTOF10: MILD PAIN (1)

## 2025-03-10 NOTE — NURSING NOTE
Patient confirms medications and allergies are accurate via patients echeck in completion, and or denies any changes since last reviewed/verified.     Current patient location: [address] [place] [address]    Is the patient currently in the state of [place]? YES    Visit mode: TELEPHONE    If the visit is dropped, the patient can be reconnected by:TELEPHONE VISIT: Phone number:   Telephone Information:   Mobile [phone]       Will anyone else be joining the visit? NO  (If patient encounters technical issues they should call [phone]    Are changes needed to the allergy or medication list? No    Are refills needed on medications prescribed by this physician? Discuss with provider    Rooming Documentation:  Questionnaire(s) not done per department protocol    Reason for visit: RECHECK    Belen ELLIS

## 2025-03-10 NOTE — PATIENT INSTRUCTIONS
"Recommendations from today's MTM visit:                                                       Continue Skyrizi 360 mg subcutaneously every six weeks. As discussed, our biologic lab partner ([facility]) may have the Skyrizi assay available by April when you see Damir Ryder PA-C. Please mention to her if you continue to notice a Skyrizi wear off between your doses.    You are due for quantiferon (tuberculosis screening), with your next set of labs.    Valorie to consider the Shingrix series. We will send an order for this to the [address] [place] [address] Pharmacy. You can schedule a vaccine appointment to pair with your dermatology visit at:   -- [email or web address]    Follow-up:    -- 6 months for MTM (9/15 at 1:00 PM for a telephone call)    It was great speaking with you today.  I value your experience and would be very thankful for your time in providing feedback in our clinic survey. In the next few days, you may receive an email or text message from [facility] [employer] with a link to a survey related to your  clinical pharmacist.\"     To schedule another MTM appointment, please call the clinic directly or you may call the MTM scheduling line at [phone].    My Clinical Pharmacist's contact information:                                                      Please feel free to contact me with any questions or concerns you have.      Crista HerronD, BCACP  MTM Pharmacist   [facility]   Phone: [phone]    "

## 2025-03-10 NOTE — TELEPHONE ENCOUNTER
Left Voicemail (1st Attempt) and Sent Mychart (1st Attempt) for the patient to call back and schedule the followin/23 Appt rescheduling due to provider unavil. AA    3/10 AA

## 2025-03-10 NOTE — PROGRESS NOTES
Medication Therapy Management (MTM) Encounter    ASSESSMENT:                            Medication Adherence/Access:   -- Per test claim by liaison team, Shingrix covered at  Pharmacy.    Crohn's Disease: Valorie would benefit from continuing on Skyrizi 360 mg subcutaneously every six weeks as maintenance therapy. Discussed that there is a drug level available for Skyrizi out of [place] labs, but it may be beneficial to wait a month or two to see if our internal biologic lab partner releases their Skyrizi assay before considering additional dose escalations. IBD provider follow-up in place, and labs are up-to-date. Discussed elements of IBD health maintenance as noted below. She is due for routine tuberculosis screening, which can be drawn with next set of labs. Discussed consideration for Shingrix series as noted. Given apparent coverage through [facility], will try coordinating first Shingrix dose at her upcoming appointment with dermatology, which could be scheduled through the pharmacy the same day.     Cough: Improving.    PLAN:                            Continue Skyrizi 360 mg subcutaneously every six weeks. As discussed, our biologic lab partner ([facility]) may have the Skyrizi assay available by April when you see Damir Ryder PA-C. Please mention to her if you continue to notice a Skyrizi wear off between your doses.    You are due for quantiferon (tuberculosis screening), with your next set of labs.    Valorie to consider the Shingrix series. We will send an order for this to the [address] [place] [address] Pharmacy. You can schedule a vaccine appointment to pair with your dermatology visit at:   -- [email or web address]    Follow-up:    -- 6 months for MTM (9/15 at 1:00 PM for a telephone call)    SUBJECTIVE/OBJECTIVE:                          Valorie Treviño is a 39 year old female seen for a follow-up visit.       Reason for visit: Skyrizi check-in    Allergies/ADRs: Reviewed in chart  Tobacco: She  reports that she has never smoked. She has never been exposed to tobacco smoke. She has never used smokeless tobacco.    Medication Adherence/Access: no issues reported.  -- no questions regarding her other medications today, states these have been consistent    Crohn's Disease:   Skyrizi 360 mg subcutaneously every six weeks  MVI daily     Notes that there is still some recurrence of symptoms prior to her next scheduled dose, however, she just wants to mention this. She doesn't necessarily want to change anything at this time. Notes change in stool frequency around a week before her dose is due. This is fairly consistent with the report when she was on every 8 week dosing, that her symptoms would return about 2-3 weeks prior to her dose being due. Notes she is open to getting the Shingrix series, but states her pharmacy told her the insurance won't cover it since she is not 50 years old, and she's not sure she wants to pay for it.     Last provider visit: 10/9/2024 with Damir Ryder PA-C  Next provider visit: 2025 with Damir Ryder PA-C  Last labs completed: 2025  Lab frequency: every 3 months   - standing labs available until 2025  Next labs due: 2025  Last TB screenin2023  PDC: 99% (Skyrizi)    Therapy Start Date: 2023    Colonoscopy 3/2024  Impression:            - Rutgeert's i1. Simple Endoscopic Score for Crohn's                          Disease: 0, mucosal inflammatory changes secondary to                          Crohn's disease, in remission. Biopsied.                          - The examined portion of the diamond-terminal ileum was                          normal.     IBD Health Maintenance    Vaccinations:  All patients on immunosuppression should avoid live vaccines unless specifically indicated.    -- Influenza (last dose) 2024  -- Tetanus booster (last dose) 2017  -- Pneumococcal Pneumonia    PCV-13: not on file   PPSV-23: 2020   PCV-20/PCV-21: 11/10/2023  -- COVID-19  "   Initial series: 2021   Last Dose: 11/4/2024    One time confirmation of immunity or serologies:  -- Hepatitis A (serologies or immunizations) not on file  -- Hepatitis B (serologies or immunizations) immune per serologies 2023  -- Varicella/Zoster    Varicella immune per serologies, did have chickenpox    Zoster not on file    Due to the immunosuppression in this patient, I would not advise administration of live vaccines such as varicella/VZV, intranasal influenza, MMR, or yellow fever vaccine (if traveling).      Immunosuppressive Screening:    Lab Results   Component Value Date    AUSAB 73.28 09/01/2023    HEPBANG Nonreactive 09/01/2023    HBCAB Nonreactive 09/01/2023    HCVAB Nonreactive 09/01/2023    TBRES Negative 09/01/2023     Cancer Screening:    Cervical cancer screening: Per USPSTF guidelines   -- due in 2029    Skin cancer screening: Recommended periodically due to patient's immunosuppression and diagnosis of IBD.   -- scheduled for 5/19    Misc:  -- Avoid tobacco use  -- Avoid NSAIDs as there is potentially a 25% chance of causing an IBD flare    Cough:  Dextromethorphan 30 mg four times daily as needed   Budesonide nebs (twice daily)  Xopenex HFA as needed   Azelastine 0.1% nasal spray twice daily     She has been having a post-viral cough for some time. She was prescribed a prednisone burse, but didn't notice a huge change with prednisone. Notes antibiotics didn't do a thing other than maybe loosen up her face a bit. She has been seen by primary care. Working theory is that she had virus, which kicked off a post-viral cough, which then triggered her asthma. She does thinks things are getting a bit better over time.    Today's Vitals: Ht 5' 8\" (1.727 m)   Wt 265 lb (120.2 kg)   LMP  (LMP Unknown)   BMI 40.29 kg/m    ----------------    I spent 19 minutes with this patient today. All changes were made via collaborative practice agreement with Aiyana Ivey.     A summary of these recommendations " was sent via [facility].    Crista Fuentes PharmD, BCACP  MTM Pharmacist   [facility]   Phone: [phone]    Telemedicine Visit Details  The patient's medications can be safely assessed via a telemedicine encounter.  Type of service:  Telephone visit  Originating Location (pt. Location): Home    Distant Location (provider location):  Off-site  Start Time:  1:01 PM  End Time:  1:20 PM     Medication Therapy Recommendations  Crohn's disease of both small and large intestine with other complication (H)   1 Rationale: Preventive therapy - Needs additional medication therapy - Indication   Recommendation: Start Medication - SHINGRIX IM   Status: Accepted per CPA   Identified Date: 3/10/2025 Completed Date: 3/10/2025

## 2025-04-10 ENCOUNTER — TELEPHONE (OUTPATIENT)
Dept: GASTROENTEROLOGY | Facility: CLINIC | Age: 40
End: 2025-04-10
Payer: COMMERCIAL

## 2025-04-10 NOTE — TELEPHONE ENCOUNTER
Received fax with prescription clarification request: frequency changed for Skyrizi OBI from Q6w to Q4w. Called [facility] to confirm this change was intentional. Patient has been reporting a waning effect with ongoing flare symptoms.

## 2025-04-22 ENCOUNTER — MYC MEDICAL ADVICE (OUTPATIENT)
Dept: GASTROENTEROLOGY | Facility: CLINIC | Age: 40
End: 2025-04-22
Payer: COMMERCIAL

## 2025-05-18 NOTE — PROGRESS NOTES
[facility] Dermatology Note  Encounter Date: May 19, 2025  Office Visit     Dermatology Problem List:  Skin exam: 05/19/2025  1. Skin cancer screening  2. Cherry angiomas  3. Hx Tinea versicolor  - current tx: ketoconazole cream  4. Hx of hand xerosis   - current tx: gentle skin care, bland emollient  ____________________________________________    Assessment & Plan:    # Skin cancer screening with < 25 multiple benign nevi, solar lentigines    - ABCDEs: Counseled ABCDEs of melanoma: Asymmetry, Border (irregularity), Color (not uniform, changes in color), Diameter (greater than 6 mm which is about the size of a pencil eraser), and Evolving (any changes in preexisting moles).  - Sun protection: Counseled SPF30+ sunscreen, UPF clothing, sun avoidance, tanning bed avoidance.    # Cherry angiomas and seborrheic keratoses (including left cheek),  Benign, reassurance given.         Procedures Performed:   None      Follow-up: 1 year(s) in-person, or earlier for new or changing lesions    Staff and Scribe:     Scribe Disclosure:   I, Belen Courtney, am serving as a scribe; to document services personally performed by Charlotte Phillip PA-C -based on data collection and the provider's statements to me.     Provider Disclosure:  I agree with above History, Review of Systems, Physical exam and Plan.  I have reviewed the content of the documentation and have edited it as needed. I have personally performed the services documented here and the documentation accurately represents those services and the decisions I have made.      Electronically signed by:  All risk, benefits and alternatives were discussed with patient.  Patient is in agreement and understands the assessment and plan.  All questions were answered.  Sun Screen Education was given.   Return to Clinic or sooner as needed.   Charlotte Phillip PA-C      ____________________________________________    CC: Skin Check (FBSE - spot on L cheek is  slowly growing)    HPI:  Ms. Lou Treviño is a(n) 39 year old female who presents today as a return patient for skin check. Last seen in dermatology by me on 05/06/2024, at which time she was prescribed ketoconazole 2%cream to treat tinea versicolor, and it was recommended that she start applying a bland emollient followed by white cotton gloves to her hands at night to treat xerosis.    The patient reports that she has a spot on her L cheek that has been slowly growing.   Mother was recently dx with vitiligo.     Patient is otherwise feeling well, without additional skin concerns.    Labs Reviewed:  N/A    Physical exam:  Vitals: There were no vitals taken for this visit.  GEN: This is a well developed, well-nourished female in no acute distress, in a pleasant mood.    SKIN: Full skin, which includes the head/face, both arms, chest, back, abdomen,both legs, genitalia and/or groin buttocks, digits and/or nails, was examined.  - There is a waxy stuck on tan to brown papule on the L cheek, trunk.  - There are dome shaped bright red papules on the trunk and extremities .   - Multiple regular brown pigmented macules and papules are identified on the trunk and extremities. .   - Scattered brown macules on sun exposed areas.  - No other lesions of concern on areas examined.     Medications:  Current Outpatient Medications   Medication Sig Dispense Refill    azelastine (ASTELIN) 0.1 % nasal spray Spray 1 spray into both nostrils 2 times daily. 30 mL 1    buPROPion (WELLBUTRIN XL) 300 MG 24 hr tablet Take 1 tablet (300 mg) by mouth every morning. 90 tablet 3    cetirizine (ZYRTEC) 10 MG tablet Take 1 tablet (10 mg) by mouth daily 90 tablet 1    citalopram (CELEXA) 20 MG tablet Take 1 tablet (20 mg) by mouth daily 90 tablet 3    famotidine (PEPCID) 20 MG tablet Take 1 tablet (20 mg) by mouth 2 times daily. 180 tablet 3    fluticasone (FLONASE) 50 MCG/ACT nasal spray Spray 1 spray into both nostrils daily 18.2 mL 0     ketoconazole (NIZORAL) 2 % external cream Apply topically daily To affected area on the left chest until clear, typically 2-3 weeks. 60 g 3    levalbuterol (XOPENEX HFA) 45 MCG/ACT inhaler Inhale 1-2 puffs into the lungs every 6 hours as needed for wheezing 15 g 1    multivitamin w/minerals (MULTI-VITAMIN) tablet Take 1 tablet by mouth daily      norethindrone-ethinyl estradiol (ORTHO-NOVUM) 1-35 MG-MCG tablet Take 1 tablet by mouth daily. 84 tablet 3    propranolol ER (INDERAL LA) 60 MG 24 hr capsule Take 1 capsule (60 mg) by mouth daily 90 capsule 1    rimegepant (NURTEC) 75 MG ODT tablet Place 1 tablet (75 mg) under the tongue daily as needed for migraine Maximum of 1 tablet every 24 hours. 8 tablet 0    risankizumab-rzaa (SKYRIZI) 360 MG/2.4ML on-body injector Inject 2.4 mLs (360 mg) subcutaneously every 4 weeks. 2.4 mL 5    budesonide (ENTOCORT EC) 3 MG EC capsule Take 3 capsules (9 mg) by mouth every morning. 90 capsule 0    dextromethorphan (TUSSIN COUGH) 15 MG/5ML syrup Take 10 mLs (30 mg) by mouth 4 times daily as needed for cough. 118 mL 1     No current facility-administered medications for this visit.      Past Medical History:   Patient Active Problem List   Diagnosis    Mild persistent asthma    Migraines    Anxiety and depression    Allergies    History of intussusception    Family history of factor V deficiency    Generalized anxiety disorder    Healthcare maintenance    Prediabetes    Nausea    Hepatic steatosis    Left knee pain    Crohn's disease of both small and large intestine with rectal bleeding (H)    Immunocompromised state    Class 2 obesity due to excess calories without serious comorbidity with body mass index (BMI) of 39.0 to 39.9 in adult     Past Medical History:   Diagnosis Date    Crohn's disease (H)     Migraine     Noninfectious ileitis     Uncomplicated asthma         CC Aiyana Ivey MD  [address] [address] on close of this encounter.

## 2025-05-19 ENCOUNTER — OFFICE VISIT (OUTPATIENT)
Dept: DERMATOLOGY | Facility: CLINIC | Age: 40
End: 2025-05-19
Attending: PHYSICIAN ASSISTANT
Payer: COMMERCIAL

## 2025-05-19 DIAGNOSIS — D18.01 CHERRY ANGIOMA: ICD-10-CM

## 2025-05-19 DIAGNOSIS — D22.9 MULTIPLE BENIGN NEVI: ICD-10-CM

## 2025-05-19 DIAGNOSIS — L82.1 SEBORRHEIC KERATOSES: Primary | ICD-10-CM

## 2025-05-19 DIAGNOSIS — L81.4 SOLAR LENTIGO: ICD-10-CM

## 2025-05-19 DIAGNOSIS — K50.811 CROHN'S DISEASE OF BOTH SMALL AND LARGE INTESTINE WITH RECTAL BLEEDING (H): ICD-10-CM

## 2025-05-19 DIAGNOSIS — Z12.83 SKIN CANCER SCREENING: ICD-10-CM

## 2025-05-19 ASSESSMENT — PAIN SCALES - GENERAL: PAINLEVEL_OUTOF10: NO PAIN (0)

## 2025-05-19 NOTE — NURSING NOTE
Dermatology Rooming Note    Lou Treviño's goals for this visit include:   Chief Complaint   Patient presents with    Skin Check     FBSE - spot on L cheek is slowly growing     GRECIA Rivas

## 2025-05-19 NOTE — PATIENT INSTRUCTIONS

## 2025-05-19 NOTE — LETTER
5/19/2025       RE: Lou Treviño  [address]  [place] [address]     Dear Colleague,    Thank you for referring your patient, Lou Treviño, to the [facility] [place] at [facility]. Please see a copy of my visit note below.    [facility] Dermatology Note  Encounter Date: May 19, 2025  Office Visit     Dermatology Problem List:  Skin exam: 05/19/2025  1. Skin cancer screening  2. Cherry angiomas  3. Hx Tinea versicolor  - current tx: ketoconazole cream  4. Hx of hand xerosis   - current tx: gentle skin care, bland emollient  ____________________________________________    Assessment & Plan:    # Skin cancer screening with < 25 multiple benign nevi, solar lentigines    - ABCDEs: Counseled ABCDEs of melanoma: Asymmetry, Border (irregularity), Color (not uniform, changes in color), Diameter (greater than 6 mm which is about the size of a pencil eraser), and Evolving (any changes in preexisting moles).  - Sun protection: Counseled SPF30+ sunscreen, UPF clothing, sun avoidance, tanning bed avoidance.    # Cherry angiomas and seborrheic keratoses (including left cheek),  Benign, reassurance given.         Procedures Performed:   None      Follow-up: 1 year(s) in-person, or earlier for new or changing lesions    Staff and Scribe:     Scribe Disclosure:   I, Belen Courtney, am serving as a scribe; to document services personally performed by Charlotte Phillip PA-C -based on data collection and the provider's statements to me.     Provider Disclosure:  I agree with above History, Review of Systems, Physical exam and Plan.  I have reviewed the content of the documentation and have edited it as needed. I have personally performed the services documented here and the documentation accurately represents those services and the decisions I have made.      Electronically signed by:  All risk, benefits and  alternatives were discussed with patient.  Patient is in agreement and understands the assessment and plan.  All questions were answered.  Sun Screen Education was given.   Return to Clinic or sooner as needed.   Charlotte Phillip PA-C      ____________________________________________    CC: Skin Check (FBSE - spot on L cheek is slowly growing)    HPI:  Ms. Lou Treviño is a(n) 39 year old female who presents today as a return patient for skin check. Last seen in dermatology by me on 05/06/2024, at which time she was prescribed ketoconazole 2%cream to treat tinea versicolor, and it was recommended that she start applying a bland emollient followed by white cotton gloves to her hands at night to treat xerosis.    The patient reports that she has a spot on her L cheek that has been slowly growing.   Mother was recently dx with vitiligo.     Patient is otherwise feeling well, without additional skin concerns.    Labs Reviewed:  N/A    Physical exam:  Vitals: There were no vitals taken for this visit.  GEN: This is a well developed, well-nourished female in no acute distress, in a pleasant mood.    SKIN: Full skin, which includes the head/face, both arms, chest, back, abdomen,both legs, genitalia and/or groin buttocks, digits and/or nails, was examined.  - There is a waxy stuck on tan to brown papule on the L cheek, trunk.  - There are dome shaped bright red papules on the trunk and extremities .   - Multiple regular brown pigmented macules and papules are identified on the trunk and extremities. .   - Scattered brown macules on sun exposed areas.  - No other lesions of concern on areas examined.     Medications:  Current Outpatient Medications   Medication Sig Dispense Refill     azelastine (ASTELIN) 0.1 % nasal spray Spray 1 spray into both nostrils 2 times daily. 30 mL 1     buPROPion (WELLBUTRIN XL) 300 MG 24 hr tablet Take 1 tablet (300 mg) by mouth every morning. 90 tablet 3     cetirizine (ZYRTEC) 10 MG tablet  Take 1 tablet (10 mg) by mouth daily 90 tablet 1     citalopram (CELEXA) 20 MG tablet Take 1 tablet (20 mg) by mouth daily 90 tablet 3     famotidine (PEPCID) 20 MG tablet Take 1 tablet (20 mg) by mouth 2 times daily. 180 tablet 3     fluticasone (FLONASE) 50 MCG/ACT nasal spray Spray 1 spray into both nostrils daily 18.2 mL 0     ketoconazole (NIZORAL) 2 % external cream Apply topically daily To affected area on the left chest until clear, typically 2-3 weeks. 60 g 3     levalbuterol (XOPENEX HFA) 45 MCG/ACT inhaler Inhale 1-2 puffs into the lungs every 6 hours as needed for wheezing 15 g 1     multivitamin w/minerals (MULTI-VITAMIN) tablet Take 1 tablet by mouth daily       norethindrone-ethinyl estradiol (ORTHO-NOVUM) 1-35 MG-MCG tablet Take 1 tablet by mouth daily. 84 tablet 3     propranolol ER (INDERAL LA) 60 MG 24 hr capsule Take 1 capsule (60 mg) by mouth daily 90 capsule 1     rimegepant (NURTEC) 75 MG ODT tablet Place 1 tablet (75 mg) under the tongue daily as needed for migraine Maximum of 1 tablet every 24 hours. 8 tablet 0     risankizumab-rzaa (SKYRIZI) 360 MG/2.4ML on-body injector Inject 2.4 mLs (360 mg) subcutaneously every 4 weeks. 2.4 mL 5     budesonide (ENTOCORT EC) 3 MG EC capsule Take 3 capsules (9 mg) by mouth every morning. 90 capsule 0     dextromethorphan (TUSSIN COUGH) 15 MG/5ML syrup Take 10 mLs (30 mg) by mouth 4 times daily as needed for cough. 118 mL 1     No current facility-administered medications for this visit.      Past Medical History:   Patient Active Problem List   Diagnosis     Mild persistent asthma     Migraines     Anxiety and depression     Allergies     History of intussusception     Family history of factor V deficiency     Generalized anxiety disorder     Healthcare maintenance     Prediabetes     Nausea     Hepatic steatosis     Left knee pain     Crohn's disease of both small and large intestine with rectal bleeding (H)     Immunocompromised state     Class 2  obesity due to excess calories without serious comorbidity with body mass index (BMI) of 39.0 to 39.9 in adult     Past Medical History:   Diagnosis Date     Crohn's disease (H)      Migraine      Noninfectious ileitis      Uncomplicated asthma         CC Aiyana Ivey MD  [address] [address] on close of this encounter.     Again, thank you for allowing me to participate in the care of your patient.      Sincerely,    Charlotte Phillip PA-C

## 2025-05-27 ENCOUNTER — VIRTUAL VISIT (OUTPATIENT)
Dept: GASTROENTEROLOGY | Facility: CLINIC | Age: 40
End: 2025-05-27
Attending: PHYSICIAN ASSISTANT
Payer: COMMERCIAL

## 2025-05-27 DIAGNOSIS — K50.811 CROHN'S DISEASE OF BOTH SMALL AND LARGE INTESTINE WITH RECTAL BLEEDING (H): Primary | ICD-10-CM

## 2025-05-27 PROCEDURE — 98006 SYNCH AUDIO-VIDEO EST MOD 30: CPT | Mod: 25 | Performed by: PHYSICIAN ASSISTANT

## 2025-05-27 RX ORDER — BUDESONIDE 3 MG/1
CAPSULE, COATED PELLETS ORAL
Qty: 132 CAPSULE | Refills: 0 | Status: SHIPPED | OUTPATIENT
Start: 2025-05-27 | End: 2025-07-24

## 2025-05-27 NOTE — PROGRESS NOTES
Virtual Visit Details    Type of service:  Video Visit     Originating Location (pt. Location): Home    Distant Location (provider location):  Off-site  Platform used for Video Visit: [facility]    GASTROENTEROLOGY IBD  OUTPATIENT CLINIC FOLLOW-UP    DATE OF SERVICE: 4/2/2024  PROVIDER REQUESTING CONSULT: Referred Self  Reason for Follow-Up: Crohn's disease     ASSESSMENT:  39-year-old female with prediabetes, elevated BMI, status post right hemicolectomy (for intussusception as an infant) with diagnosis of Crohn's disease (8/2023) initiated on Skyrizi. Endoscopic assessment w/ Colonoscopy 3/2024 Rutgeert's i1 ; Simple Endoscopic Score for Crohn's 0.  Noted to have waning effect earlier in 2025 and need to move to q4w dosing as of 4/2025.    # Crohn's disease  Initially diagnosed on colonoscopy 8/2023 for persistent diarrhea. Treated with Skyrizi (9/2023) and had slow steroid taper.  Did well until early 2025 and had waning effect and elevated CRP, that responded to entocort.  She is now on q4w skyrizi dosing and has had 2 injections at this interval. She completed budesonide course last week with uptick in symptoms again after completing, so we will extend the course and allow the q4w dosing to take effect.      RECOMMENDATIONS:  -- continue Skyrizi Q4 weeks   -- Labs every 3 months. Due now  -- continue to work with [facility] pharmacy re: healthcare maintenance  -- plan for next colonoscopy in Oct/Nov to check mucosal healing on q4 week dosing for skyrizi (order placed)    RTC 3 months     Damir Ryder PA-C  Division of Gastroenterology, Hepatology and Nutrition  [facility]       ________________________________________________________________  HPI:  Initial history:   39 yr female with prediabetes, elevated BMI, status post right hemicolectomy (for intussusception as an infant) now with diagnosis of Crohn's disease.    Pt reports that she had surgery for intususception when she was 3 months old, this  involved a right hemicolectomy with a ileotransverse anastomosis.  Since then patient has soft stool about 1-2 times daily.    Last year she had a 2-month long episode of diarrhea (increased frequency of stool) associated with abdominal cramps.  She was seen by her primary care provider who prescribed Pepcid.  Her symptoms seem to have abated after 2 months.  That episode was attributed to likely a sequela of her prior colon surgery.    July to early August 2023 she started having diarrhea which she describes as multiple bowel movements throughout the day this was associated with urgency and tenesmus.  In addition she reports seeing blood in the stool intermittently.  This led to further work-up which involved testing for enteric panel and C. difficile which were negative.  Subsequently she was referred to GI, was seen by Dr. Zoraida Jamil who did a colonoscopy on her that showed evidence of Crohn's disease.  Patient was then started on prednisone 40 mg with a plan to taper 10 mg every week.    She denies any family history of inflammatory bowel disease or colon cancer.  No family history of any other autoimmune diseases.  She does report that her mother has a pulmonary embolism which was idiopathic and is on lifelong anticoagulation.  At one point of time patient was also tested for hypercoagulability disorders and reportedly the work-up was negative..    Prior to the diagnosis of Crohn's she was using ibuprofen about 1-2 times per week for relief of headaches.  She also reports joint pains predominantly in the hands and shoulder region along with back pain.  She denies any redness or swelling of the joints and reported that the joint pains did not respond to prednisone.    Patient denies any history of smoking or alcohol.  She works as a  and is mostly involved with IT work.  She has no plans for having kids.    Had first Skyrizi infusion on 9/18/2023.     Last visit she was doing well. (2-3  stools per day, formed, no blood). Unfortunately had persistent loose stools and waning effect moved to skyrizi every 4 weeks, (4/16/25 and 5/14/25 first 2 doses of every 4 weeks). She has had She also started budesonide oral 9 mg daily for 30 days and she felt better on this, and upon discontinuing (5/19/25) now having 4 stools per day that are loosely formed. Stools are not urgent.  No blood in the stool. She also notes more recently in the last month, she has changed her diet (adding in more fruits/veg).    Currently having 2-3 stools per day that are formed, no blood. No nocturnal stools. Joint pain is usually consistent discomfort primarily in hands (on computer a lot). No new EIM.      PMHx:  Past Medical History:   Diagnosis Date    Crohn's disease (H)     Migraine     Noninfectious ileitis     Uncomplicated asthma      Patient Active Problem List   Diagnosis    Mild persistent asthma    Migraines    Anxiety and depression    Allergies    History of intussusception    Family history of factor V deficiency    Generalized anxiety disorder    Healthcare maintenance    Prediabetes    Nausea    Hepatic steatosis    Left knee pain    Crohn's disease of both small and large intestine with rectal bleeding (H)    Immunocompromised state    Class 2 obesity due to excess calories without serious comorbidity with body mass index (BMI) of 39.0 to 39.9 in adult       PSurgHx:  Past Surgical History:   Procedure Laterality Date    COLONOSCOPY N/A 8/3/2023    Procedure: COLONOSCOPY WITH BIOPSIES;  Surgeon: Zoraida Jamil MD;  Location: [facility] OR    COLONOSCOPY N/A 3/25/2024    Procedure: Colonoscopy with biopsy;  Surgeon: Aiyana Ivey MD;  Location: [facility] OR    INTUSSUSCEPTION REPAIR  1986    Hemicolectomy, removal of 11 cm of proximal and distal colon       MEDS:  Current Outpatient Medications   Medication Sig Dispense Refill    azelastine (ASTELIN) 0.1 % nasal spray Spray 1 spray into both nostrils 2 times daily. 30 mL  1    buPROPion (WELLBUTRIN XL) 300 MG 24 hr tablet Take 1 tablet (300 mg) by mouth every morning. 90 tablet 3    cetirizine (ZYRTEC) 10 MG tablet Take 1 tablet (10 mg) by mouth daily 90 tablet 1    citalopram (CELEXA) 20 MG tablet Take 1 tablet (20 mg) by mouth daily 90 tablet 3    famotidine (PEPCID) 20 MG tablet Take 1 tablet (20 mg) by mouth 2 times daily. 180 tablet 3    fluticasone (FLONASE) 50 MCG/ACT nasal spray Spray 1 spray into both nostrils daily 18.2 mL 0    ketoconazole (NIZORAL) 2 % external cream Apply topically daily To affected area on the left chest until clear, typically 2-3 weeks. 60 g 3    levalbuterol (XOPENEX HFA) 45 MCG/ACT inhaler Inhale 1-2 puffs into the lungs every 6 hours as needed for wheezing 15 g 1    multivitamin w/minerals (MULTI-VITAMIN) tablet Take 1 tablet by mouth daily      norethindrone-ethinyl estradiol (ORTHO-NOVUM) 1-35 MG-MCG tablet Take 1 tablet by mouth daily. 84 tablet 3    propranolol ER (INDERAL LA) 60 MG 24 hr capsule Take 1 capsule (60 mg) by mouth daily 90 capsule 1    rimegepant (NURTEC) 75 MG ODT tablet Place 1 tablet (75 mg) under the tongue daily as needed for migraine Maximum of 1 tablet every 24 hours. 8 tablet 0    risankizumab-rzaa (SKYRIZI) 360 MG/2.4ML on-body injector Inject 2.4 mLs (360 mg) subcutaneously every 4 weeks. 2.4 mL 5     No current facility-administered medications for this visit.     ALLERGIES:    Allergies   Allergen Reactions    Codeine     Albuterol Palpitations     Side Effect     FHx:  Family History   Problem Relation Age of Onset    Pulmonary Embolism Mother         In 2013 at age 63 years, Factor V deficiency, indefinite anticoagulation    Asthma Mother     Migraines Mother     Depression Mother     Cancer Maternal Grandmother     Cancer Paternal Grandmother     Cerebrovascular Disease Paternal Grandfather     Depression Sister     Glaucoma Maternal Great-Grandmother     Melanoma No family hx of     Skin Cancer No family hx of         SOCIAL Hx:  Social History     Socioeconomic History    Marital status:      Spouse name: Not on file    Number of children: Not on file    Years of education: Not on file    Highest education level: Not on file   Occupational History    Not on file   Tobacco Use    Smoking status: Never     Passive exposure: Never    Smokeless tobacco: Never   Vaping Use    Vaping status: Never Used   Substance and Sexual Activity    Alcohol use: Not Currently     Comment: Rare    Drug use: Never    Sexual activity: Yes     Partners: Male     Birth control/protection: Pill   Other Topics Concern    Not on file   Social History Narrative    6/21/2021 Employed as an  at [employer]. Still working from home but anticipating a return to the office. Male partner. Has a cat.      Social Drivers of Health     Financial Resource Strain: Low Risk  (11/20/2024)    Financial Resource Strain     Within the past 12 months, have you or your family members you live with been unable to get utilities (heat, electricity) when it was really needed?: No   Food Insecurity: Low Risk  (11/20/2024)    Food Insecurity     Within the past 12 months, did you worry that your food would run out before you got money to buy more?: No     Within the past 12 months, did the food you bought just not last and you didn t have money to get more?: No   Transportation Needs: Low Risk  (11/20/2024)    Transportation Needs     Within the past 12 months, has lack of transportation kept you from medical appointments, getting your medicines, non-medical meetings or appointments, work, or from getting things that you need?: No   Physical Activity: Insufficiently Active (11/20/2024)    Exercise Vital Sign     Days of Exercise per Week: 1 day     Minutes of Exercise per Session: 30 min   Stress: Stress Concern Present (11/20/2024)    [employer] [place] [employer] - Occupational Stress Questionnaire     Feeling of Stress : To some extent    Social Connections: Unknown (11/20/2024)    Social Connection and Isolation Panel [NHANES]     Frequency of Communication with Friends and Family: Not on file     Frequency of Social Gatherings with Friends and Family: Once a week     Attends Gnosticist Services: Not on file     Active Member of Clubs or Organizations: Not on file     Attends Club or Organization Meetings: Not on file     Marital Status: Not on file   Interpersonal Safety: Low Risk  (11/25/2024)    Interpersonal Safety     Do you feel physically and emotionally safe where you currently live?: Yes     Within the past 12 months, have you been hit, slapped, kicked or otherwise physically hurt by someone?: No     Within the past 12 months, have you been humiliated or emotionally abused in other ways by your partner or ex-partner?: No   Housing Stability: Low Risk  (11/20/2024)    Housing Stability     Do you have housing? : Yes     Are you worried about losing your housing?: No       ROS: A comprehensive Review of Systems was asked and answered in the negative unless specifically commented upon in the HPI    Physical Exam  There were no vitals taken for this visit.  There is no height or weight on file to calculate BMI.  Limited exam VV visit   Gen:Pt in no acute distress  HEENT: no scleral icterus.  Lungs: Non labored breathing  Skin: no jaundice  Neuro: Alert and oriented x 3     LABS:  CBC RESULTS:   Recent Labs   Lab Test 03/25/24  1103   WBC 8.1   RBC 4.76   HGB 13.1   HCT 39.5   MCV 83   MCH 27.5   MCHC 33.2   RDW 12.4       Last Comprehensive Metabolic Panel:  Lab Results   Component Value Date     02/26/2025    POTASSIUM 4.1 02/26/2025    CHLORIDE 103 02/26/2025    CO2 24 02/26/2025    ANIONGAP 12 02/26/2025     (H) 02/26/2025    BUN 7.9 02/26/2025    CR 0.79 02/26/2025    GFRESTIMATED >90 02/26/2025    JENNIFER 9.0 02/26/2025        CRP Inflammation   Date Value Ref Range Status   02/26/2025 13.40 (H) <5.00 mg/L Final       IMAGING/PROCEDURES  Colonoscopy 3/25/2024  Findings:       The perianal and digital rectal examinations were normal.        The diamond-terminal ileum appeared normal.        Single small ulcer at the ileocolonic anastomosis, consistent with        Rutgeert's i1. The Simple Endoscopic Score for Crohn's Disease was        determined based on the endoscopic appearance of the mucosa in the        following segments:        - Ileum: Findings include no ulcers present, no ulcerated surfaces, no        affected surfaces and no narrowings. Segment score: 0.        - Right Colon: Findings include surgically absent. Segment score: 0.        - Transverse Colon: Findings include no ulcers present, no ulcerated        surfaces, no affected surfaces and no narrowings. Segment score: 0.        - Left Colon: Findings include no ulcers present, no ulcerated surfaces,        no affected surfaces and no narrowings. Segment score: 0.        - Rectum: Findings include no ulcers present, no ulcerated surfaces, no        affected surfaces and no narrowings. Segment score: 0.        - Total SES-CD aggregate score: 0. Biopsies were taken with a cold        forceps for histology.                                                                                    Impression:            - Rutgeert's i1. Simple Endoscopic Score for Crohn's                          Disease: 0, mucosal inflammatory changes secondary to                          Crohn's disease, in remission. Biopsied.                          - The examined portion of the diamond-terminal ileum was                          normal.   Recommendation:        - Discharge patient to home (ambulatory).                          - Resume previous diet.                          - Continue present medications.                          - Await pathology results.                          - The findings and recommendations were discussed with                          the patient.     Final Diagnosis    A. TRANSVERSE COLON, BIOPSIES:  - Colonic mucosa with no significant histologic abnormality  - Negative for acute inflammation, signs of chronicity and dysplasia     B. DESCENDING COLON, BIOPSIES:  - Colonic mucosa with crypt architectural distortion and left-sided Paneth cell metaplasia; compatible with site of prior injury  - Negative for acute inflammation and dysplasia     C. SIGMOID COLON,BIOPSIES:  - Colonic mucosa with crypt architectural distortion and left-sided Paneth cell metaplasia; compatible with site of prior injury  - Negative for acute inflammation and dysplasia     D.  RECTUM, BIOPSIES:  - Colorectal mucosa with minimal crypt architectural distortion and focal Paneth cell metaplasia; compatible with site of prior injury  - Negative for acute inflammation and dysplasia

## 2025-05-27 NOTE — LETTER
5/27/2025      Lou Treviño  [address]  [place] [address]      Dear Colleague,    Thank you for referring your patient, Lou Treviño, to the [facility] [place]. Please see a copy of my visit note below.    Virtual Visit Details    Type of service:  Video Visit     Originating Location (pt. Location): Home    Distant Location (provider location):  Off-site  Platform used for Video Visit: [facility]    GASTROENTEROLOGY IBD  OUTPATIENT CLINIC FOLLOW-UP    DATE OF SERVICE: 4/2/2024  PROVIDER REQUESTING CONSULT: Referred Self  Reason for Follow-Up: Crohn's disease     ASSESSMENT:  39-year-old female with prediabetes, elevated BMI, status post right hemicolectomy (for intussusception as an infant) with diagnosis of Crohn's disease (8/2023) initiated on Skyrizi with waning effect and need to move to q4w dosing as of 4/2025.    # Crohn's disease, now in deep remission  # Colonoscopy Rutgeert's i1 ; Simple Endoscopic Score for Crohn's 0  Initially diagnosed on colonoscopy 8/2023 for persistent diarrhea. Treated with Skyrizi (9/2023) and had slow steroid taper.  Did well until early 2025 and had waning effect and elevated CRP, that responded to entocort.  She is now on q4w skyrizi dosing and has had 2 injections at this interval. She completed budesonide course last week with uptick in symptoms again after completing, so we will extend the course and allow the q4w dosing to take effect.      RECOMMENDATIONS:  -- continue Skyrizi Q4 weeks   -- Labs every 3 months. Due now  -- continue to work with [facility] pharmacy re: healthcare maintenance  -- plan for next colonoscopy in Oct/Nov to check mucosal healing on q4 week dosing for skyrizi (order placed)    RTC 3 months     Damir Ryder PA-C  Division of Gastroenterology, Hepatology and Nutrition  [facility]       ________________________________________________________________  HPI:  Initial history:   39 yr female  with prediabetes, elevated BMI, status post right hemicolectomy (for intussusception as an infant) now with diagnosis of Crohn's disease.    Pt reports that she had surgery for intususception when she was 3 months old, this involved a right hemicolectomy with a ileotransverse anastomosis.  Since then patient has soft stool about 1-2 times daily.    Last year she had a 2-month long episode of diarrhea (increased frequency of stool) associated with abdominal cramps.  She was seen by her primary care provider who prescribed Pepcid.  Her symptoms seem to have abated after 2 months.  That episode was attributed to likely a sequela of her prior colon surgery.    July to early August 2023 she started having diarrhea which she describes as multiple bowel movements throughout the day this was associated with urgency and tenesmus.  In addition she reports seeing blood in the stool intermittently.  This led to further work-up which involved testing for enteric panel and C. difficile which were negative.  Subsequently she was referred to GI, was seen by Dr. Zoraida Jamil who did a colonoscopy on her that showed evidence of Crohn's disease.  Patient was then started on prednisone 40 mg with a plan to taper 10 mg every week.    She denies any family history of inflammatory bowel disease or colon cancer.  No family history of any other autoimmune diseases.  She does report that her mother has a pulmonary embolism which was idiopathic and is on lifelong anticoagulation.  At one point of time patient was also tested for hypercoagulability disorders and reportedly the work-up was negative..    Prior to the diagnosis of Crohn's she was using ibuprofen about 1-2 times per week for relief of headaches.  She also reports joint pains predominantly in the hands and shoulder region along with back pain.  She denies any redness or swelling of the joints and reported that the joint pains did not respond to prednisone.    Patient denies any  history of smoking or alcohol.  She works as a  and is mostly involved with IT work.  She has no plans for having kids.    Had first Skyrizi infusion on 9/18/2023.     Last visit she was doing well. (2-3 stools per day, formed, no blood). Unfortunately had persistent loose stools and waning effect moved to skyrizi every 4 weeks, (4/16/25 and 5/14/25 first 2 doses of every 4 weeks). She has had She also started budesonide oral 9 mg daily for 30 days and she felt better on this, and upon discontinuing (5/19/25) now having 4 stools per day that are loosely formed. Stools are not urgent.  No blood in the stool. She also notes more recently in the last month, she has changed her diet (adding in more fruits/veg).    Currently having 2-3 stools per day that are formed, no blood. No nocturnal stools. Joint pain is usually consistent discomfort primarily in hands (on computer a lot). No new EIM.      PMHx:  Past Medical History:   Diagnosis Date     Crohn's disease (H)      Migraine      Noninfectious ileitis      Uncomplicated asthma      Patient Active Problem List   Diagnosis     Mild persistent asthma     Migraines     Anxiety and depression     Allergies     History of intussusception     Family history of factor V deficiency     Generalized anxiety disorder     Healthcare maintenance     Prediabetes     Nausea     Hepatic steatosis     Left knee pain     Crohn's disease of both small and large intestine with rectal bleeding (H)     Immunocompromised state     Class 2 obesity due to excess calories without serious comorbidity with body mass index (BMI) of 39.0 to 39.9 in adult       PSurgHx:  Past Surgical History:   Procedure Laterality Date     COLONOSCOPY N/A 8/3/2023    Procedure: COLONOSCOPY WITH BIOPSIES;  Surgeon: Zoraida Jamil MD;  Location: [facility] OR     COLONOSCOPY N/A 3/25/2024    Procedure: Colonoscopy with biopsy;  Surgeon: Aiyana Ivey MD;  Location: [facility] OR     INTUSSUSCEPTION  REPAIR  1986    Hemicolectomy, removal of 11 cm of proximal and distal colon       MEDS:  Current Outpatient Medications   Medication Sig Dispense Refill     azelastine (ASTELIN) 0.1 % nasal spray Spray 1 spray into both nostrils 2 times daily. 30 mL 1     buPROPion (WELLBUTRIN XL) 300 MG 24 hr tablet Take 1 tablet (300 mg) by mouth every morning. 90 tablet 3     cetirizine (ZYRTEC) 10 MG tablet Take 1 tablet (10 mg) by mouth daily 90 tablet 1     citalopram (CELEXA) 20 MG tablet Take 1 tablet (20 mg) by mouth daily 90 tablet 3     famotidine (PEPCID) 20 MG tablet Take 1 tablet (20 mg) by mouth 2 times daily. 180 tablet 3     fluticasone (FLONASE) 50 MCG/ACT nasal spray Spray 1 spray into both nostrils daily 18.2 mL 0     ketoconazole (NIZORAL) 2 % external cream Apply topically daily To affected area on the left chest until clear, typically 2-3 weeks. 60 g 3     levalbuterol (XOPENEX HFA) 45 MCG/ACT inhaler Inhale 1-2 puffs into the lungs every 6 hours as needed for wheezing 15 g 1     multivitamin w/minerals (MULTI-VITAMIN) tablet Take 1 tablet by mouth daily       norethindrone-ethinyl estradiol (ORTHO-NOVUM) 1-35 MG-MCG tablet Take 1 tablet by mouth daily. 84 tablet 3     propranolol ER (INDERAL LA) 60 MG 24 hr capsule Take 1 capsule (60 mg) by mouth daily 90 capsule 1     rimegepant (NURTEC) 75 MG ODT tablet Place 1 tablet (75 mg) under the tongue daily as needed for migraine Maximum of 1 tablet every 24 hours. 8 tablet 0     risankizumab-rzaa (SKYRIZI) 360 MG/2.4ML on-body injector Inject 2.4 mLs (360 mg) subcutaneously every 4 weeks. 2.4 mL 5     No current facility-administered medications for this visit.     ALLERGIES:    Allergies   Allergen Reactions     Codeine      Albuterol Palpitations     Side Effect     FHx:  Family History   Problem Relation Age of Onset     Pulmonary Embolism Mother         In 2013 at age 63 years, Factor V deficiency, indefinite anticoagulation     Asthma Mother      Migraines  Mother      Depression Mother      Cancer Maternal Grandmother      Cancer Paternal Grandmother      Cerebrovascular Disease Paternal Grandfather      Depression Sister      Glaucoma Maternal Great-Grandmother      Melanoma No family hx of      Skin Cancer No family hx of        SOCIAL Hx:  Social History     Socioeconomic History     Marital status:      Spouse name: Not on file     Number of children: Not on file     Years of education: Not on file     Highest education level: Not on file   Occupational History     Not on file   Tobacco Use     Smoking status: Never     Passive exposure: Never     Smokeless tobacco: Never   Vaping Use     Vaping status: Never Used   Substance and Sexual Activity     Alcohol use: Not Currently     Comment: Rare     Drug use: Never     Sexual activity: Yes     Partners: Male     Birth control/protection: Pill   Other Topics Concern     Not on file   Social History Narrative    6/21/2021 Employed as an  at [employer]. Still working from home but anticipating a return to the office. Male partner. Has a cat.      Social Drivers of Health     Financial Resource Strain: Low Risk  (11/20/2024)    Financial Resource Strain      Within the past 12 months, have you or your family members you live with been unable to get utilities (heat, electricity) when it was really needed?: No   Food Insecurity: Low Risk  (11/20/2024)    Food Insecurity      Within the past 12 months, did you worry that your food would run out before you got money to buy more?: No      Within the past 12 months, did the food you bought just not last and you didn t have money to get more?: No   Transportation Needs: Low Risk  (11/20/2024)    Transportation Needs      Within the past 12 months, has lack of transportation kept you from medical appointments, getting your medicines, non-medical meetings or appointments, work, or from getting things that you need?: No   Physical Activity: Insufficiently  Active (11/20/2024)    Exercise Vital Sign      Days of Exercise per Week: 1 day      Minutes of Exercise per Session: 30 min   Stress: Stress Concern Present (11/20/2024)    [employer] [place] [employer] - Occupational Stress Questionnaire      Feeling of Stress : To some extent   Social Connections: Unknown (11/20/2024)    Social Connection and Isolation Panel [NHANES]      Frequency of Communication with Friends and Family: Not on file      Frequency of Social Gatherings with Friends and Family: Once a week      Attends Mandaeism Services: Not on file      Active Member of Clubs or Organizations: Not on file      Attends Club or Organization Meetings: Not on file      Marital Status: Not on file   Interpersonal Safety: Low Risk  (11/25/2024)    Interpersonal Safety      Do you feel physically and emotionally safe where you currently live?: Yes      Within the past 12 months, have you been hit, slapped, kicked or otherwise physically hurt by someone?: No      Within the past 12 months, have you been humiliated or emotionally abused in other ways by your partner or ex-partner?: No   Housing Stability: Low Risk  (11/20/2024)    Housing Stability      Do you have housing? : Yes      Are you worried about losing your housing?: No       ROS: A comprehensive Review of Systems was asked and answered in the negative unless specifically commented upon in the HPI    Physical Exam  There were no vitals taken for this visit.  There is no height or weight on file to calculate BMI.  Limited exam [facility] visit   Gen:Pt in no acute distress  HEENT: no scleral icterus.  Lungs: Non labored breathing  Skin: no jaundice  Neuro: Alert and oriented x 3     LABS:  CBC RESULTS:   Recent Labs   Lab Test 03/25/24  1103   WBC 8.1   RBC 4.76   HGB 13.1   HCT 39.5   MCV 83   MCH 27.5   MCHC 33.2   RDW 12.4       Last Comprehensive Metabolic Panel:  Lab Results   Component Value Date     02/26/2025    POTASSIUM 4.1  02/26/2025    CHLORIDE 103 02/26/2025    CO2 24 02/26/2025    ANIONGAP 12 02/26/2025     (H) 02/26/2025    BUN 7.9 02/26/2025    CR 0.79 02/26/2025    GFRESTIMATED >90 02/26/2025    JENNIFER 9.0 02/26/2025        CRP Inflammation   Date Value Ref Range Status   02/26/2025 13.40 (H) <5.00 mg/L Final      IMAGING/PROCEDURES  Colonoscopy 3/25/2024  Findings:       The perianal and digital rectal examinations were normal.        The diamond-terminal ileum appeared normal.        Single small ulcer at the ileocolonic anastomosis, consistent with        Rutgeert's i1. The Simple Endoscopic Score for Crohn's Disease was        determined based on the endoscopic appearance of the mucosa in the        following segments:        - Ileum: Findings include no ulcers present, no ulcerated surfaces, no        affected surfaces and no narrowings. Segment score: 0.        - Right Colon: Findings include surgically absent. Segment score: 0.        - Transverse Colon: Findings include no ulcers present, no ulcerated        surfaces, no affected surfaces and no narrowings. Segment score: 0.        - Left Colon: Findings include no ulcers present, no ulcerated surfaces,        no affected surfaces and no narrowings. Segment score: 0.        - Rectum: Findings include no ulcers present, no ulcerated surfaces, no        affected surfaces and no narrowings. Segment score: 0.        - Total SES-CD aggregate score: 0. Biopsies were taken with a cold        forceps for histology.                                                                                    Impression:            - Rutgeert's i1. Simple Endoscopic Score for Crohn's                          Disease: 0, mucosal inflammatory changes secondary to                          Crohn's disease, in remission. Biopsied.                          - The examined portion of the diamond-terminal ileum was                          normal.   Recommendation:        - Discharge patient to home  (ambulatory).                          - Resume previous diet.                          - Continue present medications.                          - Await pathology results.                          - The findings and recommendations were discussed with                          the patient.     Final Diagnosis   A. TRANSVERSE COLON, BIOPSIES:  - Colonic mucosa with no significant histologic abnormality  - Negative for acute inflammation, signs of chronicity and dysplasia     B. DESCENDING COLON, BIOPSIES:  - Colonic mucosa with crypt architectural distortion and left-sided Paneth cell metaplasia; compatible with site of prior injury  - Negative for acute inflammation and dysplasia     C. SIGMOID COLON,BIOPSIES:  - Colonic mucosa with crypt architectural distortion and left-sided Paneth cell metaplasia; compatible with site of prior injury  - Negative for acute inflammation and dysplasia     D.  RECTUM, BIOPSIES:  - Colorectal mucosa with minimal crypt architectural distortion and focal Paneth cell metaplasia; compatible with site of prior injury  - Negative for acute inflammation and dysplasia                                                 Again, thank you for allowing me to participate in the care of your patient.        Sincerely,        Damir Ryder PA-C    Electronically signed

## 2025-05-27 NOTE — NURSING NOTE
Current patient location: [address] [place] [address]    Is the patient currently in the state of [place]? YES    Visit mode: VIDEO    If the visit is dropped, the patient can be reconnected by:VIDEO VISIT: Send to e-mail at: [email or web address]    Will anyone else be joining the visit? NO  (If patient encounters technical issues they should call [phone]    Are changes needed to the allergy or medication list? No    Are refills needed on medications prescribed by this physician? Discuss with provider    Rooming Documentation:  Questionnaire(s) completed    Reason for visit: RECHECK    Fred ELLIS

## 2025-05-27 NOTE — PATIENT INSTRUCTIONS
It was a pleasure meeting with you today and discussing your healthcare plan. Below is a summary of what we covered:    -- Continue skyrizi every 4 weeks  -- Labs every 3 months (due now)  -- Start budesonide 9mg x 30 days then 6mg x 14 days then 3 mg x 14 days  -- Patient with IBD we recommend supplementation vitamin D 1000 units daily and calcium 500 mg twice daily.  -- No NSAIDs (ibuprofen, or anything containing ibuprofen)    For additional resources about inflammatory bowel disease visit http://www.crohnscolitisfoundation.org/    To learn more about Diet and Nutrition in the setting of IBD, check out some of these resources:  https://www.crohnscolitisfoundation.org/diet-and-nutrition/what-should-i-eat    https://ntforibd.org/ (SCD, mSCD, Autoimmune protocol, IBD-AID, CDED diets with inclusion/exclusion charts)    Damir Ryder PA-C  Division of Gastroenterology, Hepatology and Nutrition  [facility]          Please see below for any additional questions and scheduling guidelines.    Sign up for [employer]: [employer] patient portal serves as a secure platform for accessing your medical records from the [facility]. Additionally, [employer] facilitates easy, timely, and secure messaging with your care team. If you have not signed up, you may do so by using the provided code or calling [phone].    Coordinating your care after your visit:  There are multiple options for scheduling your follow-up care based on your provider's recommendation.    How do I schedule a follow-up clinic appointment:   After your appointment, you may receive scheduling assistance with the Clinic Coordinators by having a seat in the waiting room and a Clinic Coordinator will call you up to schedule.  Virtual visits or after you leave the clinic:  Your provider has placed a follow-up order in the [employer] portal for scheduling your return appointment. A member of the scheduling team will contact you to  schedule.  [facility] Scheduling: Timely scheduling through [employer] is advised to ensure appointment availability.   Call to schedule: You may schedule your follow-up appointment(s) by calling [phone], option 1.    How do I schedule my endoscopy or colonoscopy procedure:  If a procedure, such as a colonoscopy or upper endoscopy was ordered by your provider, the scheduling team will contact you to schedule this procedure. Or you may choose to call to schedule at   [phone], option 2.  Please allow 20-30 minutes when scheduling a procedure.    How do I get my blood work done? To get your blood work done, you need to schedule a lab appointment at an [facility]. There are multiple ways to schedule:   At the clinic: The Clinic Coordinator you meet after your visit can help you schedule a lab appointment.   [employer] scheduling: [employer] offers online lab scheduling at all [facility] laboratory locations.   Call to schedule: You can call [phone] to schedule your lab appointment.    How do I schedule my imaging study: To schedule imaging studies, such as CT scans, ultrasounds, MRIs, or X-rays, contact Imaging Services at [phone].    How do I schedule a referral to another doctor: If your provider recommended a referral to another specialist(s), the referral order was placed by your provider. You will receive a phone call to schedule this referral, or you may choose to call the number attached to the referral to self-schedule.    For Post-Visit Question(s):  For any inquiries following today's visit:  Please utilize [employer] messaging and allow 48 hours for reply or contact the Call Center during normal business hours at [phone], option 3.  For Emergent After-hours questions, contact the On-Call GI Fellow through the [facility] at [phone].  In addition, you may contact your Nurse directly using the provided contact information.    Test Results:  Test results  will be accessible via [employer] in compliance with the 21st Century Cures Act. This means that your results will be available to you at the same time as your provider. Often you may see your results before your provider does. Results are reviewed by staff within two weeks with communication follow-up. Results may be released in the patient portal prior to your care team review.    Prescription Refill(s):  Medication prescribed by your provider will be addressed during your visit. For future refills, please coordinate with your pharmacy. If you have not had a recent clinic visit or routine labs, for your safety, your provider may not be able to refill your prescription.

## 2025-06-10 ENCOUNTER — LAB (OUTPATIENT)
Dept: LAB | Facility: CLINIC | Age: 40
End: 2025-06-10
Payer: COMMERCIAL

## 2025-06-10 DIAGNOSIS — K50.818 CROHN'S DISEASE OF BOTH SMALL AND LARGE INTESTINE WITH OTHER COMPLICATION (H): ICD-10-CM

## 2025-06-10 LAB
ALBUMIN SERPL BCG-MCNC: 3.9 G/DL (ref 3.5–5.2)
ALP SERPL-CCNC: 77 U/L (ref 40–150)
ALT SERPL W P-5'-P-CCNC: 15 U/L (ref 0–50)
ANION GAP SERPL CALCULATED.3IONS-SCNC: 10 MMOL/L (ref 7–15)
AST SERPL W P-5'-P-CCNC: 17 U/L (ref 0–45)
BASOPHILS # BLD AUTO: 0 10E3/UL (ref 0–0.2)
BASOPHILS NFR BLD AUTO: 0 %
BILIRUB SERPL-MCNC: 0.2 MG/DL
BUN SERPL-MCNC: 9.8 MG/DL (ref 6–20)
CALCIUM SERPL-MCNC: 9.1 MG/DL (ref 8.8–10.4)
CHLORIDE SERPL-SCNC: 106 MMOL/L (ref 98–107)
CREAT SERPL-MCNC: 0.86 MG/DL (ref 0.51–0.95)
CRP SERPL-MCNC: 17 MG/L
EGFRCR SERPLBLD CKD-EPI 2021: 88 ML/MIN/1.73M2
EOSINOPHIL # BLD AUTO: 0.1 10E3/UL (ref 0–0.7)
EOSINOPHIL NFR BLD AUTO: 1 %
ERYTHROCYTE [DISTWIDTH] IN BLOOD BY AUTOMATED COUNT: 12.2 % (ref 10–15)
GLUCOSE SERPL-MCNC: 101 MG/DL (ref 70–99)
HCO3 SERPL-SCNC: 24 MMOL/L (ref 22–29)
HCT VFR BLD AUTO: 41.1 % (ref 35–47)
HGB BLD-MCNC: 13.1 G/DL (ref 11.7–15.7)
IMM GRANULOCYTES # BLD: 0 10E3/UL
IMM GRANULOCYTES NFR BLD: 0 %
LYMPHOCYTES # BLD AUTO: 2.9 10E3/UL (ref 0.8–5.3)
LYMPHOCYTES NFR BLD AUTO: 26 %
MCH RBC QN AUTO: 27.9 PG (ref 26.5–33)
MCHC RBC AUTO-ENTMCNC: 31.9 G/DL (ref 31.5–36.5)
MCV RBC AUTO: 87 FL (ref 78–100)
MONOCYTES # BLD AUTO: 0.7 10E3/UL (ref 0–1.3)
MONOCYTES NFR BLD AUTO: 6 %
NEUTROPHILS # BLD AUTO: 7.3 10E3/UL (ref 1.6–8.3)
NEUTROPHILS NFR BLD AUTO: 66 %
PLATELET # BLD AUTO: 288 10E3/UL (ref 150–450)
POTASSIUM SERPL-SCNC: 4.2 MMOL/L (ref 3.4–5.3)
PROT SERPL-MCNC: 7.5 G/DL (ref 6.4–8.3)
RBC # BLD AUTO: 4.7 10E6/UL (ref 3.8–5.2)
SODIUM SERPL-SCNC: 140 MMOL/L (ref 135–145)
WBC # BLD AUTO: 11 10E3/UL (ref 4–11)

## 2025-06-10 PROCEDURE — 86140 C-REACTIVE PROTEIN: CPT

## 2025-06-10 PROCEDURE — 85025 COMPLETE CBC W/AUTO DIFF WBC: CPT

## 2025-06-10 PROCEDURE — 36415 COLL VENOUS BLD VENIPUNCTURE: CPT

## 2025-06-10 PROCEDURE — 80053 COMPREHEN METABOLIC PANEL: CPT

## 2025-06-11 ENCOUNTER — RESULTS FOLLOW-UP (OUTPATIENT)
Dept: GASTROENTEROLOGY | Facility: CLINIC | Age: 40
End: 2025-06-11

## 2025-06-23 ENCOUNTER — TELEPHONE (OUTPATIENT)
Dept: PHARMACY | Facility: CLINIC | Age: 40
End: 2025-06-23
Payer: COMMERCIAL

## 2025-06-23 NOTE — TELEPHONE ENCOUNTER
Return-MTM follow up needs to be r/s    Call placed to pt to initiate r/s on 06/23/25    Outcome: Pt prefers to keep 1pm time slot d/t work schedule. Message into Crista to see if I can take a 1pm slot on Tuesday, September 16th. Crista maynard'd it. R/s appt for 9/16 at 1pm. MyC confirmation sent to pt

## 2025-07-08 ENCOUNTER — TELEPHONE (OUTPATIENT)
Dept: GASTROENTEROLOGY | Facility: CLINIC | Age: 40
End: 2025-07-08
Payer: COMMERCIAL

## 2025-07-08 NOTE — TELEPHONE ENCOUNTER
"Endoscopy Scheduling Screen    Caller: patient    Have you had any respiratory illness or flu-like symptoms in the last 10 days?  No    Patient is ACTIVE on [employer].  Inform patient that all appointment instructions will be sent via [employer].    Review patient's insurance for any non participating payor.    Ordering/Referring Provider: Damir Ryder PA-C     (If ordering provider performs procedure, schedule with ordering provider unless otherwise instructed. )    BMI: Estimated body mass index is 40.29 kg/m  as calculated from the following:    Height as of 3/10/25: 1.727 m (5' 8\").    Weight as of 3/10/25: 120.2 kg (265 lb).     Sedation Ordered  moderate sedation.   If patient BMI > 50 do not schedule in [facility].    If patient BMI > 45 do not schedule at [facility].    Are you taking methadone or Suboxone?  NO, No RN review required.    Have you been diagnosed and are being treated for severe PTSD or severe anxiety?  YES, Schedule with [facility]. (If patient has additional questions please InBasket to RN pool for .)    Are you taking any prescription medications for pain 3 or more times per week?   NO, No RN review required.    Do you have a history of malignant hyperthermia?  No    (Females) Are you currently pregnant?   No     Have you been diagnosed or told you have pulmonary hypertension?   No    Do you have an LVAD?  No    Have you been told you have moderate to severe sleep apnea?  No.    Have you been told you have COPD, asthma, or any other lung disease?  Yes     What breathing problems do you have?  Asthma     Do you use home oxygen?  No    Have your breathing problems required an ED visit or hospitalization in the last year?  No.    Has your doctor ordered any cardiac tests like echo, angiogram, stress test, ablation, or EKG, that you have not completed yet?  No    Do you  have a history of any heart conditions?  No     Have you ever had or are you waiting for an organ transplant?  No. Continue scheduling, no " "site restrictions.    Have you had a stroke or transient ischemic attack (TIA aka \"mini stroke\") in the last 2 years?   No.    Have you been diagnosed with or been told you have cirrhosis of the liver?   No.    Are you currently on dialysis?   No    Do you need assistance transferring?   No    BMI: Estimated body mass index is 40.29 kg/m  as calculated from the following:    Height as of 3/10/25: 1.727 m (5' 8\").    Weight as of 3/10/25: 120.2 kg (265 lb).     Is patients BMI > 40 and scheduling location [facility]?  No    Do you take an injectable or oral medication for weight loss or diabetes (excluding insulin)?  No    Do you take the medication Naltrexone?  No    Do you take blood thinners?  No       Prep   Are you currently on dialysis or do you have chronic kidney disease?  No    Do you have a diagnosis of diabetes?  No  Pre diabetic  Do you have a diagnosis of cystic fibrosis (CF)?  No    On a regular basis do you go 3 -5 days between bowel movements?  No    BMI > 40?  Yes (Extended Prep)    Preferred Pharmacy:    [id number] [place], [place] - [address]  [address] [address]  Phone: [phone] Fax: [phone]    Final Scheduling Details     Procedure scheduled  Colonoscopy    Surgeon:  Uche     Date of procedure:  10/21     Pre-OP / PAC:   No - Not required for this site.    Location  [facility] - Patient preference.    Sedation   MAC/Deep Sedation - Per exclusion criteria.      Patient Reminders:   You will receive a call from a Nurse to review instructions and health history.  This assessment must be completed prior to your procedure.  Failure to complete the Nurse assessment may result in the procedure being cancelled.      On the day of your procedure, please designate an adult(s) who can drive you home stay with you for the next 24 hours. The medicines used in the exam will make you sleepy. You will not be able to drive.      You cannot take public transportation, ride share " services, or non-medical taxi service without a responsible caregiver.  Medical transport services are allowed with the requirement that a responsible caregiver will receive you at your destination.  We require that drivers and caregivers are confirmed prior to your procedure.

## 2025-08-13 ENCOUNTER — TELEPHONE (OUTPATIENT)
Dept: FAMILY MEDICINE | Facility: CLINIC | Age: 40
End: 2025-08-13
Payer: COMMERCIAL

## 2025-08-27 ENCOUNTER — MYC MEDICAL ADVICE (OUTPATIENT)
Dept: FAMILY MEDICINE | Facility: CLINIC | Age: 40
End: 2025-08-27
Payer: COMMERCIAL

## (undated) DEVICE — SNARE CAPIVATOR ROUND COLD SNR BX10 M00561101

## (undated) DEVICE — TUBING SUCTION MEDI-VAC 1/4"X20' N620A

## (undated) DEVICE — KIT ENDO TURNOVER/PROCEDURE CARRY-ON 101822

## (undated) DEVICE — SPECIMEN CONTAINER 3OZ W/FORMALIN 59901

## (undated) DEVICE — SOL WATER IRRIG 500ML BOTTLE 2F7113

## (undated) DEVICE — ENDO FORCEP BX CAPTURA PRO SPIKE G50696

## (undated) DEVICE — SUCTION MANIFOLD NEPTUNE 2 SYS 1 PORT 702-025-000

## (undated) DEVICE — GOWN IMPERVIOUS 2XL BLUE